# Patient Record
Sex: FEMALE | Race: WHITE | NOT HISPANIC OR LATINO | Employment: UNEMPLOYED | URBAN - METROPOLITAN AREA
[De-identification: names, ages, dates, MRNs, and addresses within clinical notes are randomized per-mention and may not be internally consistent; named-entity substitution may affect disease eponyms.]

---

## 2020-03-14 ENCOUNTER — APPOINTMENT (OUTPATIENT)
Dept: CT IMAGING | Facility: HOSPITAL | Age: 80
End: 2020-03-14

## 2020-03-14 ENCOUNTER — APPOINTMENT (OUTPATIENT)
Dept: GENERAL RADIOLOGY | Facility: HOSPITAL | Age: 80
End: 2020-03-14

## 2020-03-14 ENCOUNTER — APPOINTMENT (OUTPATIENT)
Dept: ULTRASOUND IMAGING | Facility: HOSPITAL | Age: 80
End: 2020-03-14

## 2020-03-14 ENCOUNTER — HOSPITAL ENCOUNTER (INPATIENT)
Facility: HOSPITAL | Age: 80
LOS: 4 days | Discharge: HOME HEALTH | End: 2020-03-18
Attending: INTERNAL MEDICINE | Admitting: HOSPITALIST

## 2020-03-14 DIAGNOSIS — N17.9 ACUTE RENAL FAILURE, UNSPECIFIED ACUTE RENAL FAILURE TYPE (HCC): ICD-10-CM

## 2020-03-14 DIAGNOSIS — F41.9 ANXIETY DISORDER, UNSPECIFIED TYPE: Primary | ICD-10-CM

## 2020-03-14 PROBLEM — I25.10 CORONARY ARTERY DISEASE: Status: ACTIVE | Noted: 2020-03-14

## 2020-03-14 PROBLEM — I10 HYPERTENSION: Status: ACTIVE | Noted: 2020-03-14

## 2020-03-14 PROBLEM — I25.5 CARDIOMYOPATHY, ISCHEMIC: Status: ACTIVE | Noted: 2020-03-14

## 2020-03-14 PROBLEM — J44.9 CHRONIC OBSTRUCTIVE PULMONARY DISEASE (HCC): Status: ACTIVE | Noted: 2020-03-14

## 2020-03-14 LAB
ALBUMIN SERPL-MCNC: 3.4 G/DL (ref 3.5–5.2)
ALBUMIN/GLOB SERPL: 1.3 G/DL
ALP SERPL-CCNC: 100 U/L (ref 39–117)
ALT SERPL W P-5'-P-CCNC: 14 U/L (ref 1–33)
ANION GAP SERPL CALCULATED.3IONS-SCNC: 15 MMOL/L (ref 5–15)
ANION GAP SERPL CALCULATED.3IONS-SCNC: 15 MMOL/L (ref 5–15)
AST SERPL-CCNC: 43 U/L (ref 1–32)
BACTERIA UR QL AUTO: ABNORMAL /HPF
BILIRUB SERPL-MCNC: 0.7 MG/DL (ref 0.2–1.2)
BILIRUB UR QL STRIP: ABNORMAL
BUN BLD-MCNC: 65 MG/DL (ref 8–23)
BUN BLD-MCNC: 66 MG/DL (ref 8–23)
BUN/CREAT SERPL: 11.9 (ref 7–25)
BUN/CREAT SERPL: 17 (ref 7–25)
CALCIUM SPEC-SCNC: 8.1 MG/DL (ref 8.6–10.5)
CALCIUM SPEC-SCNC: 8.3 MG/DL (ref 8.6–10.5)
CHLORIDE SERPL-SCNC: 90 MMOL/L (ref 98–107)
CHLORIDE SERPL-SCNC: 95 MMOL/L (ref 98–107)
CHOLEST SERPL-MCNC: 175 MG/DL (ref 0–200)
CLARITY UR: ABNORMAL
CO2 SERPL-SCNC: 29 MMOL/L (ref 22–29)
CO2 SERPL-SCNC: 30 MMOL/L (ref 22–29)
COLOR UR: ABNORMAL
CREAT BLD-MCNC: 3.83 MG/DL (ref 0.57–1)
CREAT BLD-MCNC: 5.54 MG/DL (ref 0.57–1)
D-LACTATE SERPL-SCNC: 1.1 MMOL/L (ref 0.5–2)
DEPRECATED RDW RBC AUTO: 45.9 FL (ref 37–54)
ERYTHROCYTE [DISTWIDTH] IN BLOOD BY AUTOMATED COUNT: 13.9 % (ref 12.3–15.4)
GFR SERPL CREATININE-BSD FRML MDRD: 11 ML/MIN/1.73
GFR SERPL CREATININE-BSD FRML MDRD: 7 ML/MIN/1.73
GFR SERPL CREATININE-BSD FRML MDRD: ABNORMAL ML/MIN/{1.73_M2}
GFR SERPL CREATININE-BSD FRML MDRD: ABNORMAL ML/MIN/{1.73_M2}
GLOBULIN UR ELPH-MCNC: 2.6 GM/DL
GLUCOSE BLD-MCNC: 164 MG/DL (ref 65–99)
GLUCOSE BLD-MCNC: 84 MG/DL (ref 65–99)
GLUCOSE UR STRIP-MCNC: ABNORMAL MG/DL
HCT VFR BLD AUTO: 32.4 % (ref 34–46.6)
HDLC SERPL-MCNC: 53 MG/DL (ref 40–60)
HGB BLD-MCNC: 10.6 G/DL (ref 12–15.9)
HGB UR QL STRIP.AUTO: ABNORMAL
HYALINE CASTS UR QL AUTO: ABNORMAL /LPF
KETONES UR QL STRIP: NEGATIVE
LDLC SERPL CALC-MCNC: 94 MG/DL (ref 0–100)
LDLC/HDLC SERPL: 1.78 {RATIO}
LEUKOCYTE ESTERASE UR QL STRIP.AUTO: ABNORMAL
LIPASE SERPL-CCNC: 5 U/L (ref 13–60)
LYMPHOCYTES # BLD MANUAL: 1.02 10*3/MM3 (ref 0.7–3.1)
LYMPHOCYTES NFR BLD MANUAL: 3 % (ref 5–12)
LYMPHOCYTES NFR BLD MANUAL: 7 % (ref 19.6–45.3)
MCH RBC QN AUTO: 30.5 PG (ref 26.6–33)
MCHC RBC AUTO-ENTMCNC: 32.8 G/DL (ref 31.5–35.7)
MCV RBC AUTO: 93 FL (ref 79–97)
MONOCYTES # BLD AUTO: 0.44 10*3/MM3 (ref 0.1–0.9)
NEUTROPHILS # BLD AUTO: 13.05 10*3/MM3 (ref 1.7–7)
NEUTROPHILS NFR BLD MANUAL: 72 % (ref 42.7–76)
NEUTS BAND NFR BLD MANUAL: 18 % (ref 0–5)
NITRITE UR QL STRIP: NEGATIVE
NT-PROBNP SERPL-MCNC: 3535 PG/ML (ref 5–1800)
PH UR STRIP.AUTO: <=5 [PH] (ref 5–8)
PLAT MORPH BLD: NORMAL
PLATELET # BLD AUTO: 215 10*3/MM3 (ref 140–450)
PMV BLD AUTO: 7.2 FL (ref 6–12)
POTASSIUM BLD-SCNC: 4 MMOL/L (ref 3.5–5.2)
POTASSIUM BLD-SCNC: 4.3 MMOL/L (ref 3.5–5.2)
PROT SERPL-MCNC: 6 G/DL (ref 6–8.5)
PROT UR QL STRIP: ABNORMAL
RBC # BLD AUTO: 3.49 10*6/MM3 (ref 3.77–5.28)
RBC # UR: ABNORMAL /HPF
RBC MORPH BLD: NORMAL
REF LAB TEST METHOD: ABNORMAL
SCAN SLIDE: NORMAL
SODIUM BLD-SCNC: 134 MMOL/L (ref 136–145)
SODIUM BLD-SCNC: 140 MMOL/L (ref 136–145)
SP GR UR STRIP: 1.02 (ref 1–1.03)
SQUAMOUS #/AREA URNS HPF: ABNORMAL /HPF
TOXIC GRANULATION: ABNORMAL
TRIGL SERPL-MCNC: 139 MG/DL (ref 0–150)
TROPONIN T SERPL-MCNC: 0.05 NG/ML (ref 0–0.03)
TSH SERPL DL<=0.05 MIU/L-ACNC: 0.18 UIU/ML (ref 0.27–4.2)
UROBILINOGEN UR QL STRIP: ABNORMAL
VLDLC SERPL-MCNC: 27.8 MG/DL
WBC NRBC COR # BLD: 14.5 10*3/MM3 (ref 3.4–10.8)
WBC UR QL AUTO: ABNORMAL /HPF
YEAST URNS QL MICRO: ABNORMAL /HPF

## 2020-03-14 PROCEDURE — 84484 ASSAY OF TROPONIN QUANT: CPT | Performed by: NURSE PRACTITIONER

## 2020-03-14 PROCEDURE — 83605 ASSAY OF LACTIC ACID: CPT | Performed by: NURSE PRACTITIONER

## 2020-03-14 PROCEDURE — 25010000002 ENOXAPARIN PER 10 MG: Performed by: NURSE PRACTITIONER

## 2020-03-14 PROCEDURE — 76775 US EXAM ABDO BACK WALL LIM: CPT

## 2020-03-14 PROCEDURE — 94799 UNLISTED PULMONARY SVC/PX: CPT

## 2020-03-14 PROCEDURE — 83690 ASSAY OF LIPASE: CPT | Performed by: NURSE PRACTITIONER

## 2020-03-14 PROCEDURE — 80053 COMPREHEN METABOLIC PANEL: CPT | Performed by: NURSE PRACTITIONER

## 2020-03-14 PROCEDURE — 85025 COMPLETE CBC W/AUTO DIFF WBC: CPT | Performed by: NURSE PRACTITIONER

## 2020-03-14 PROCEDURE — 84443 ASSAY THYROID STIM HORMONE: CPT | Performed by: NURSE PRACTITIONER

## 2020-03-14 PROCEDURE — 81001 URINALYSIS AUTO W/SCOPE: CPT | Performed by: NURSE PRACTITIONER

## 2020-03-14 PROCEDURE — 71045 X-RAY EXAM CHEST 1 VIEW: CPT

## 2020-03-14 PROCEDURE — 87086 URINE CULTURE/COLONY COUNT: CPT | Performed by: INTERNAL MEDICINE

## 2020-03-14 PROCEDURE — 70450 CT HEAD/BRAIN W/O DYE: CPT

## 2020-03-14 PROCEDURE — 85007 BL SMEAR W/DIFF WBC COUNT: CPT | Performed by: NURSE PRACTITIONER

## 2020-03-14 PROCEDURE — 80061 LIPID PANEL: CPT | Performed by: NURSE PRACTITIONER

## 2020-03-14 PROCEDURE — 63710000001 PREDNISONE PER 5 MG: Performed by: NURSE PRACTITIONER

## 2020-03-14 PROCEDURE — 99223 1ST HOSP IP/OBS HIGH 75: CPT | Performed by: INTERNAL MEDICINE

## 2020-03-14 PROCEDURE — 83880 ASSAY OF NATRIURETIC PEPTIDE: CPT | Performed by: INTERNAL MEDICINE

## 2020-03-14 RX ORDER — IPRATROPIUM BROMIDE AND ALBUTEROL SULFATE 2.5; .5 MG/3ML; MG/3ML
3 SOLUTION RESPIRATORY (INHALATION)
Status: DISCONTINUED | OUTPATIENT
Start: 2020-03-14 | End: 2020-03-18 | Stop reason: HOSPADM

## 2020-03-14 RX ORDER — CLONAZEPAM 0.5 MG/1
0.5 TABLET ORAL 3 TIMES DAILY
Status: DISCONTINUED | OUTPATIENT
Start: 2020-03-14 | End: 2020-03-17

## 2020-03-14 RX ORDER — SODIUM CHLORIDE 9 MG/ML
100 INJECTION, SOLUTION INTRAVENOUS CONTINUOUS
Status: DISCONTINUED | OUTPATIENT
Start: 2020-03-14 | End: 2020-03-15

## 2020-03-14 RX ORDER — GABAPENTIN 300 MG/1
300 CAPSULE ORAL 3 TIMES DAILY
COMMUNITY
End: 2020-03-18 | Stop reason: HOSPADM

## 2020-03-14 RX ORDER — MELATONIN
2000 DAILY
COMMUNITY

## 2020-03-14 RX ORDER — ACETAMINOPHEN 160 MG/5ML
650 SOLUTION ORAL EVERY 4 HOURS PRN
Status: DISCONTINUED | OUTPATIENT
Start: 2020-03-14 | End: 2020-03-18 | Stop reason: HOSPADM

## 2020-03-14 RX ORDER — PREDNISONE 1 MG/1
5 TABLET ORAL DAILY
Status: DISCONTINUED | OUTPATIENT
Start: 2020-03-14 | End: 2020-03-18 | Stop reason: HOSPADM

## 2020-03-14 RX ORDER — POLYETHYLENE GLYCOL 3350 17 G/17G
17 POWDER, FOR SOLUTION ORAL 2 TIMES DAILY
COMMUNITY

## 2020-03-14 RX ORDER — ACETAMINOPHEN 325 MG/1
650 TABLET ORAL EVERY 4 HOURS PRN
Status: DISCONTINUED | OUTPATIENT
Start: 2020-03-14 | End: 2020-03-18 | Stop reason: HOSPADM

## 2020-03-14 RX ORDER — SODIUM CHLORIDE 0.9 % (FLUSH) 0.9 %
10 SYRINGE (ML) INJECTION EVERY 12 HOURS SCHEDULED
Status: DISCONTINUED | OUTPATIENT
Start: 2020-03-14 | End: 2020-03-18 | Stop reason: HOSPADM

## 2020-03-14 RX ORDER — ONDANSETRON 4 MG/1
4 TABLET, FILM COATED ORAL EVERY 6 HOURS PRN
Status: DISCONTINUED | OUTPATIENT
Start: 2020-03-14 | End: 2020-03-18 | Stop reason: HOSPADM

## 2020-03-14 RX ORDER — IPRATROPIUM BROMIDE AND ALBUTEROL SULFATE 2.5; .5 MG/3ML; MG/3ML
3 SOLUTION RESPIRATORY (INHALATION)
COMMUNITY

## 2020-03-14 RX ORDER — CLOPIDOGREL BISULFATE 75 MG/1
75 TABLET ORAL DAILY
Status: DISCONTINUED | OUTPATIENT
Start: 2020-03-14 | End: 2020-03-18 | Stop reason: HOSPADM

## 2020-03-14 RX ORDER — TRAMADOL HYDROCHLORIDE 50 MG/1
50 TABLET ORAL 3 TIMES DAILY PRN
COMMUNITY

## 2020-03-14 RX ORDER — CLOPIDOGREL BISULFATE 75 MG/1
75 TABLET ORAL DAILY
COMMUNITY
End: 2021-10-09 | Stop reason: HOSPADM

## 2020-03-14 RX ORDER — PREDNISONE 1 MG/1
5 TABLET ORAL DAILY
COMMUNITY
Start: 2021-08-13

## 2020-03-14 RX ORDER — LISINOPRIL 5 MG/1
5 TABLET ORAL DAILY
COMMUNITY
End: 2020-03-18 | Stop reason: HOSPADM

## 2020-03-14 RX ORDER — TRAZODONE HYDROCHLORIDE 50 MG/1
50 TABLET ORAL NIGHTLY
Status: DISCONTINUED | OUTPATIENT
Start: 2020-03-14 | End: 2020-03-18 | Stop reason: HOSPADM

## 2020-03-14 RX ORDER — ONDANSETRON 2 MG/ML
4 INJECTION INTRAMUSCULAR; INTRAVENOUS EVERY 6 HOURS PRN
Status: DISCONTINUED | OUTPATIENT
Start: 2020-03-14 | End: 2020-03-18 | Stop reason: HOSPADM

## 2020-03-14 RX ORDER — METOPROLOL SUCCINATE 50 MG/1
50 TABLET, EXTENDED RELEASE ORAL DAILY
COMMUNITY

## 2020-03-14 RX ORDER — LISINOPRIL 5 MG/1
5 TABLET ORAL DAILY
Status: DISCONTINUED | OUTPATIENT
Start: 2020-03-14 | End: 2020-03-14

## 2020-03-14 RX ORDER — TRAZODONE HYDROCHLORIDE 50 MG/1
50 TABLET ORAL NIGHTLY
COMMUNITY

## 2020-03-14 RX ORDER — GABAPENTIN 300 MG/1
300 CAPSULE ORAL 3 TIMES DAILY
Status: DISCONTINUED | OUTPATIENT
Start: 2020-03-14 | End: 2020-03-14

## 2020-03-14 RX ORDER — GABAPENTIN 100 MG/1
200 CAPSULE ORAL 3 TIMES DAILY
Status: DISCONTINUED | OUTPATIENT
Start: 2020-03-14 | End: 2020-03-17

## 2020-03-14 RX ORDER — SODIUM CHLORIDE 0.9 % (FLUSH) 0.9 %
10 SYRINGE (ML) INJECTION AS NEEDED
Status: DISCONTINUED | OUTPATIENT
Start: 2020-03-14 | End: 2020-03-18 | Stop reason: HOSPADM

## 2020-03-14 RX ORDER — ACETAMINOPHEN 650 MG/1
650 SUPPOSITORY RECTAL EVERY 4 HOURS PRN
Status: DISCONTINUED | OUTPATIENT
Start: 2020-03-14 | End: 2020-03-18 | Stop reason: HOSPADM

## 2020-03-14 RX ORDER — CLONAZEPAM 0.5 MG/1
0.5 TABLET ORAL 3 TIMES DAILY
COMMUNITY
End: 2020-03-18 | Stop reason: HOSPADM

## 2020-03-14 RX ORDER — METOPROLOL SUCCINATE 50 MG/1
50 TABLET, EXTENDED RELEASE ORAL DAILY
Status: DISCONTINUED | OUTPATIENT
Start: 2020-03-14 | End: 2020-03-18 | Stop reason: HOSPADM

## 2020-03-14 RX ADMIN — CLONAZEPAM 0.5 MG: 0.5 TABLET ORAL at 09:43

## 2020-03-14 RX ADMIN — GABAPENTIN 300 MG: 300 CAPSULE ORAL at 09:43

## 2020-03-14 RX ADMIN — PREDNISONE 5 MG: 5 TABLET ORAL at 09:43

## 2020-03-14 RX ADMIN — CLONAZEPAM 0.5 MG: 0.5 TABLET ORAL at 21:19

## 2020-03-14 RX ADMIN — SODIUM CHLORIDE 100 ML/HR: 900 INJECTION, SOLUTION INTRAVENOUS at 09:38

## 2020-03-14 RX ADMIN — METOPROLOL SUCCINATE 50 MG: 50 TABLET, EXTENDED RELEASE ORAL at 09:43

## 2020-03-14 RX ADMIN — SODIUM CHLORIDE 100 ML/HR: 900 INJECTION, SOLUTION INTRAVENOUS at 02:58

## 2020-03-14 RX ADMIN — IPRATROPIUM BROMIDE AND ALBUTEROL SULFATE 3 ML: .5; 3 SOLUTION RESPIRATORY (INHALATION) at 19:34

## 2020-03-14 RX ADMIN — GABAPENTIN 200 MG: 100 CAPSULE ORAL at 21:19

## 2020-03-14 RX ADMIN — Medication 10 ML: at 21:20

## 2020-03-14 RX ADMIN — CLOPIDOGREL BISULFATE 75 MG: 75 TABLET ORAL at 09:43

## 2020-03-14 RX ADMIN — IPRATROPIUM BROMIDE AND ALBUTEROL SULFATE 3 ML: .5; 3 SOLUTION RESPIRATORY (INHALATION) at 09:25

## 2020-03-14 RX ADMIN — IPRATROPIUM BROMIDE AND ALBUTEROL SULFATE 3 ML: .5; 3 SOLUTION RESPIRATORY (INHALATION) at 11:34

## 2020-03-14 RX ADMIN — TRAZODONE HYDROCHLORIDE 50 MG: 50 TABLET ORAL at 21:19

## 2020-03-14 RX ADMIN — ENOXAPARIN SODIUM 30 MG: 30 INJECTION SUBCUTANEOUS at 19:01

## 2020-03-14 RX ADMIN — SODIUM CHLORIDE 1000 ML: 900 INJECTION, SOLUTION INTRAVENOUS at 03:06

## 2020-03-14 NOTE — PLAN OF CARE
Problem: Patient Care Overview  Goal: Plan of Care Review  Outcome: Ongoing (interventions implemented as appropriate)  Flowsheets (Taken 3/14/2020 0654)  Progress: no change  Plan of Care Reviewed With: patient; daughter  Note:   Pt was a direct admit from a [facility].  Pts daughter arrived shortly following.  Pt is pleasantly confused.  Urine is dark and concentrated and malodorous. Pt and daughter currently sleeping.  No s/s of acute distress.    Goal: Individualization and Mutuality  Outcome: Ongoing (interventions implemented as appropriate)  Goal: Discharge Needs Assessment  Outcome: Ongoing (interventions implemented as appropriate)  Goal: Interprofessional Rounds/Family Conf  Outcome: Ongoing (interventions implemented as appropriate)     Problem: Fall Risk (Adult)  Goal: Identify Related Risk Factors and Signs and Symptoms  Outcome: Ongoing (interventions implemented as appropriate)  Goal: Absence of Fall  Outcome: Ongoing (interventions implemented as appropriate)     Problem: Pain, Chronic (Adult)  Goal: Identify Related Risk Factors and Signs and Symptoms  Outcome: Ongoing (interventions implemented as appropriate)  Goal: Acceptable Pain/Comfort Level and Functional Ability  Outcome: Ongoing (interventions implemented as appropriate)     Problem: Skin Injury Risk (Adult)  Goal: Identify Related Risk Factors and Signs and Symptoms  Outcome: Ongoing (interventions implemented as appropriate)  Goal: Skin Health and Integrity  Outcome: Ongoing (interventions implemented as appropriate)

## 2020-03-14 NOTE — CONSULTS
INITIAL CONSULT NOTE      Patient Name: Dipti Perez  : 1940  MRN: [id number]  Primary Care Physician: Mounika, No Known  Date of admission: 3/14/2020    Patient Care Team:  Provider, No Known as PCP - General        Reason for Consult:       Acute renal failure  Subjective   History of Present Illness:   Chief Complaint: No chief complaint on file.    HISTORY:  Dipti Perez is a 80 y.o. female with past medical history of Congestive heart failure, cardiomyopathy, coronary artery bypass graft, COPD, hyperlipidemia, hypertension, history of some dementia. who presents with clinical status to an outside hospital.  As per record there creatinine was 1 at that hospital.  Unable to get any detailed history from the patient.  Patient has had declining health for the last few months.  With poor appetite lethargy.  No other complaints.  No significant shortness of breath.  Blood pressure running on the low side.  Patient was on ACE inhibitor.  Recent urine studies has only minimal proteinuria.  Labs showed creatinine is 5.4 which is slightly better but proBNP level in 3500.  Calcium 8.3.  Bicarb level is 29 potassium and sodium acceptable.          Review of systems:  Cannot be obtained      Personal History:     Past Medical History:   Past Medical History:   Diagnosis Date   • CAD (coronary artery disease)    • CHF (congestive heart failure) (CMS/[facility])    • COPD (chronic obstructive pulmonary disease) (CMS/[facility])    • Dementia (CMS/[facility])    • Elevated cholesterol    • Hyperlipidemia    • Hypertension        Surgical History:      Past Surgical History:   Procedure Laterality Date   • CARDIAC SURGERY     • CORONARY ARTERY BYPASS GRAFT     • SPINE SURGERY             Family History: Family history is unknown by patient. Otherwise pertinent FHx was reviewed and unremarkable.     Social History:  reports that she has quit smoking. She has a 10.00 pack-year smoking history. She has never used smokeless  tobacco. She reports that she does not drink alcohol or use drugs.      Medications:  Prior to Admission medications    Medication Sig Start Date End Date Taking? Authorizing Provider   cholecalciferol (VITAMIN D3) 25 MCG (1000 UT) tablet Take 2,000 Units by mouth Daily.   Yes January Ribera MD   clonazePAM (KlonoPIN) 0.5 MG tablet Take 0.5 mg by mouth 3 (Three) Times a Day.   Yes January Ribera MD   clopidogrel (PLAVIX) 75 MG tablet Take 75 mg by mouth Daily.   Yes January Ribera MD   gabapentin (NEURONTIN) 300 MG capsule Take 300 mg by mouth 3 (Three) Times a Day.   Yes January Ribera MD   ipratropium-albuterol (DUO-NEB) 0.5-2.5 mg/3 ml nebulizer Take 3 mL by nebulization 4 (Four) Times a Day.   Yes January Ribera MD   lisinopril (PRINIVIL,ZESTRIL) 5 MG tablet Take 5 mg by mouth Daily.   Yes January Ribera MD   metoprolol succinate XL (TOPROL-XL) 50 MG 24 hr tablet Take 50 mg by mouth Daily.   Yes January Ribera MD   polyethylene glycol (MIRALAX) packet Take 17 g by mouth 2 (Two) Times a Day.   Yes January Ribera MD   predniSONE (DELTASONE) 5 MG tablet Take 5 mg by mouth Daily.   Yes January Ribera MD   traMADol (ULTRAM) 50 MG tablet Take 50 mg by mouth 3 (Three) Times a Day As Needed for Moderate Pain  ( MG TID PRN).   Yes January Ribera MD   traZODone (DESYREL) 50 MG tablet Take 50 mg by mouth Every Night.   Yes January Ribera MD     Scheduled Meds:  clonazePAM 0.5 mg Oral TID   clopidogrel 75 mg Oral Daily   enoxaparin 30 mg Subcutaneous Daily   gabapentin 200 mg Oral TID   ipratropium-albuterol 3 mL Nebulization 4x Daily - RT   metoprolol succinate XL 50 mg Oral Daily   predniSONE 5 mg Oral Daily   sodium chloride 10 mL Intravenous Q12H   traZODone 50 mg Oral Nightly     Continuous Infusions:  sodium chloride 100 mL/hr Last Rate: 100 mL/hr (03/14/20 0938)     PRN Meds:•  acetaminophen **OR** acetaminophen **OR** acetaminophen  •   ondansetron **OR** ondansetron  •  sodium chloride  Allergies:    Allergies   Allergen Reactions   • Keflex [Cephalexin] Anaphylaxis       Objective   Exam:     Vital Signs  Temp:  [97.5 °F (36.4 °C)-98.4 °F (36.9 °C)] 97.5 °F (36.4 °C)  Heart Rate:  [65-81] 65  Resp:  [13-20] 18  BP: ()/(48-62) 97/48  SpO2:  [85 %-100 %] 96 %  on  Flow (L/min):  [1-5] 3;   Device (Oxygen Therapy): nasal cannula  Body mass index is 21.28 kg/m².  EXAM  General: Elderly female  female in no acute distress.    Head:      Normocephalic and atraumatic.    Eyes:      PERRL/EOM intact, conjunctiva and sclera clear with out nystagmus.    Neck:      No masses, thyromegaly,  trachea central   Lungs:    Clear bilaterally to auscultation.    Heart:      Systolic murmur  Abd:        Soft, nontender, not distended, bowel sounds positive, no shifting dullness.  Msk:        Not examined  Pulses:   Pulses normal in all 4 extremities.    Extr:        No cyanosis or clubbing--maybe some edema.    Neuro:    No focal deficits.   Some weakness and lethargy  Skin:       Intact without lesions or rashes.    Psych:    Patient is somewhat confused    Results Review:  I have personally reviewed most recent Data :  BMP @LABRCNT(creatinine:10)  CBC    Results from last 7 days   Lab Units 03/14/20  0437   WBC 10*3/mm3 14.50*   HEMOGLOBIN g/dL 10.6*   PLATELETS 10*3/mm3 215     CMP Results from last 7 days   Lab Units 03/14/20  0253   SODIUM mmol/L 134*   POTASSIUM mmol/L 4.0   CHLORIDE mmol/L 90*   CO2 mmol/L 29.0   BUN mg/dL 66*   CREATININE mg/dL 5.54*   GLUCOSE mg/dL 84   ALBUMIN g/dL 3.40*   BILIRUBIN mg/dL 0.7   ALK PHOS U/L 100   AST (SGOT) U/L 43*   ALT (SGPT) U/L 14   LIPASE U/L 5*     ABG      Imaging Results (Last 24 Hours)     Procedure Component Value Units Date/Time    US Renal Bilateral [[id number]] Resulted:  03/14/20 1816     Updated:  03/14/20 1816                 Assessment/Plan   Assessment and Plan:         Acute renal failure (ARF)  (CMS/[facility])    Benign hypertensive heart disease    Cardiomyopathy, ischemic    Chronic obstructive pulmonary disease (CMS/[facility])    Coronary artery disease    Hypertension    ASSESSMENT:  • Acute kidney injury: Patient BNP level is high echocardiogram is still pending but with bicarb level of 29 normal electrolytes patient acute kidney injury seems to be related to volume depletion but BNP level is still elevated.  • Congestive heart failure ejection fraction unknown repeat echocardiogram pending  • Acute coronary syndrome  • History of some dementia  • History of hypertension      Plan:     • Follow-up with a urine studies at this time to estimate protein excretion  • Follow-up with echocardiogram  • Etiology of acute increase in creatinine seem to be multifactorial including low blood pressure, decreased p.o. intake some volume depletion in the presence of ACE inhibitor's.  No evidence of nonsteroidal anti-inflammatory drugs or any contrast nephropathy at this time.  But renal functions are slowly improving for now  • And blood pressure is on the low side we will give some node bolus and follow-up with volume status closely  • Repeat creatinine continue to improve with some improvement in the volume status  • We will check stool for C. difficile as patient is complaining of some diarrhea  • No more IV fluid needed at this time as BNP level is significantly elevated and patient hemodynamics have improved  • Follow-up with echocardiogram which is pending  • Follow-up with cardiology regarding underlying cardiac condition  • Hold ACE inhibitor's at this time  •       Note started  by Zoran Do MD, 03/14/20, 6:27 PM.  [facility] kidney consultant

## 2020-03-14 NOTE — PLAN OF CARE
Problem: Patient Care Overview  Goal: Plan of Care Review  Outcome: Ongoing (interventions implemented as appropriate)  Flowsheets  Taken 3/14/2020 1450 by Robina Garza RN  Progress: no change  Taken 3/14/2020 0654 by Yasmin Ceron RN  Plan of Care Reviewed With: patient;daughter  Note:   Nephrology consulted. Pt more alert and oriented this afternoon. Family at bedside. Plan of care discussed. Will continue to monitor.

## 2020-03-14 NOTE — H&P
[facility] Medicine Services      Patient Name: Dipti Perez  : 1940  MRN: [id number]  Primary Care Physician: Mounika, No Known  Date of admission: 3/14/2020    Patient Care Team:  Provider, No Known as PCP - General          Subjective   History Present Illness     Chief Complaint: No chief complaint on file.      Ms. Perez is a 80 y.o.  presents to [facility] complaining of acute renal injury         80-year-old presents as a transfer from [facility] where she presented with a chief complaint that her daughter felt patient was confused and had fallen 2 days ago secondary to being off balance.  The patient was also reported to have decreased appetite and decreased urine output.  The patient has been transferred to this facility secondary to acute renal failure.  The daughter is not present at time of interview.  The patient reports that she has been eating and drinking okay but there is noted significantly dry mucosa.  The patient is able to see that she is at [facility] and that she is 80 years old.  However, she cannot name the month or year.  Through review of records there is report of baseline dementia, the patient lives at home with her daughter.  The patient denies any recent changes to her medications, subjective fever or chills.  Again, however, the patient is unable to name the month or year or so her input may be suspect.    Review of records from prior facility show WBC 19.03, hemoglobin 12.2, influenza A negative, influenza B negative; lactic acid 3.9; mildly elevated AST at 55, ALT 24, alkaline phosphate 97, bilirubin total 0.9      Review of Systems   Unable to perform ROS: mental status change           Personal History     Past Medical History:   Past Medical History:   Diagnosis Date   • CAD (coronary artery disease)    • CHF (congestive heart failure) (CMS/[facility])    • COPD (chronic obstructive pulmonary disease) (CMS/[facility])     • Dementia (CMS/[facility])    • Elevated cholesterol    • Hyperlipidemia    • Hypertension        Surgical History:      Past Surgical History:   Procedure Laterality Date   • CARDIAC SURGERY     • CORONARY ARTERY BYPASS GRAFT     • SPINE SURGERY             Family History: Family history is unknown by patient. Otherwise pertinent FHx was reviewed and unremarkable.     Social History:  reports that she has quit smoking. She has a 10.00 pack-year smoking history. She has never used smokeless tobacco. She reports that she does not drink alcohol or use drugs.      Medications:  Prior to Admission medications    Medication Sig Start Date End Date Taking? Authorizing Provider   cholecalciferol (VITAMIN D3) 25 MCG (1000 UT) tablet Take 2,000 Units by mouth Daily.   Yes January Ribera MD   clonazePAM (KlonoPIN) 0.5 MG tablet Take 0.5 mg by mouth 3 (Three) Times a Day.   Yes January Ribera MD   clopidogrel (PLAVIX) 75 MG tablet Take 75 mg by mouth Daily.   Yes January Ribera MD   gabapentin (NEURONTIN) 300 MG capsule Take 300 mg by mouth 3 (Three) Times a Day.   Yes January Ribera MD   ipratropium-albuterol (DUO-NEB) 0.5-2.5 mg/3 ml nebulizer Take 3 mL by nebulization 4 (Four) Times a Day.   Yes January Ribera MD   lisinopril (PRINIVIL,ZESTRIL) 5 MG tablet Take 5 mg by mouth Daily.   Yes January Ribera MD   metoprolol succinate XL (TOPROL-XL) 50 MG 24 hr tablet Take 50 mg by mouth Daily.   Yes January Ribera MD   polyethylene glycol (MIRALAX) packet Take 17 g by mouth 2 (Two) Times a Day.   Yes January Ribera MD   predniSONE (DELTASONE) 5 MG tablet Take 5 mg by mouth Daily.   Yes January Ribera MD   traMADol (ULTRAM) 50 MG tablet Take 50 mg by mouth 3 (Three) Times a Day As Needed for Moderate Pain  ( MG TID PRN).   Yes January Ribera MD   traZODone (DESYREL) 50 MG tablet Take 50 mg by mouth Every Night.   Yes January Ribera MD       Allergies:     Allergies   Allergen Reactions   • Keflex [Cephalexin] Anaphylaxis       Objective   Objective     Vital Signs  Temp:  [97.8 °F (36.6 °C)] 97.8 °F (36.6 °C)  Resp:  [15] 15  BP: (114)/(62) 114/62     on  Flow (L/min):  [1] 1;   Device (Oxygen Therapy): nasal cannula  Body mass index is 21.28 kg/m².    Physical Exam   Constitutional: She is oriented to person, place, and time. She appears well-developed. No distress.   thin   HENT:   Head: Normocephalic and atraumatic.   Eyes: Pupils are equal, round, and reactive to light. Conjunctivae and EOM are normal. No scleral icterus.   Neck: Normal range of motion. Neck supple. No JVD present. No tracheal deviation present. No thyromegaly present.   Cardiovascular: Normal rate, regular rhythm, normal heart sounds and intact distal pulses.   No murmur heard.  Pulmonary/Chest: Effort normal and breath sounds normal. No respiratory distress. She has no wheezes.   Abdominal: Soft. Bowel sounds are normal. She exhibits no distension.   Musculoskeletal: Normal range of motion. She exhibits no edema.   Lymphadenopathy:     She has no cervical adenopathy.   Neurological: She is alert and oriented to person, place, and time.   Person, age and place   Skin: Skin is warm. Capillary refill takes less than 2 seconds. She is not diaphoretic. No erythema.   Psychiatric: She has a normal mood and affect.   Patient is confused and has difficulty thinking of some items and is lucid with other consistent with a global deficit likely metabolic encephalopathy from azotemia   Vitals reviewed.      Results Review:  I have personally reviewed most recent lab results and agree with findings, most notably: Acute renal failure.    Results from last 7 days   Lab Units 03/14/20  0437   WBC 10*3/mm3 14.50*   HEMOGLOBIN g/dL 10.6*   HEMATOCRIT % 32.4*   PLATELETS 10*3/mm3 215     Results from last 7 days   Lab Units 03/14/20  0415 03/14/20  0253   SODIUM mmol/L  --  134*   POTASSIUM mmol/L  --  4.0      CHLORIDE mmol/L  --  90*   CO2 mmol/L  --  29.0   BUN mg/dL  --  66*   CREATININE mg/dL  --  5.54*   GLUCOSE mg/dL  --  84   CALCIUM mg/dL  --  8.3*   ALT (SGPT) U/L  --  14   AST (SGOT) U/L  --  43*   TROPONIN T ng/mL  --  0.052*   LACTATE mmol/L 1.1  --      Estimated Creatinine Clearance: 7 mL/min (A) (by C-G formula based on SCr of 5.54 mg/dL (H)).  Brief Urine Lab Results  (Last result in the past 365 days)      Color   Clarity   Blood   Leuk Est   Nitrite   Protein   CREAT   Urine HCG        03/14/20 0620 Dark Yellow Hazy Small (1+) Small (1+) Negative 30 mg/dL (1+)               Microbiology Results (last 10 days)     ** No results found for the last 240 hours. **          ECG/EMG Results (most recent)     None                    No radiology results for the last 7 days      Estimated Creatinine Clearance: 7 mL/min (A) (by C-G formula based on SCr of 5.54 mg/dL (H)).    Assessment/Plan   Assessment/Plan       Active Hospital Problems    Diagnosis  POA   • Acute renal failure (ARF) (CMS/[facility]) [N17.9]  Yes     Priority: High   • Benign hypertensive heart disease [I11.9]  Yes     Priority: High   • Cardiomyopathy, ischemic [I25.5]  Yes   • Chronic obstructive pulmonary disease (CMS/[facility]) [J44.9]  Yes   • Coronary artery disease [I25.10]  Yes   • Hypertension [I10]  Yes      Resolved Hospital Problems   No resolved problems to display.     Altered mental status secondary to acute metabolic encephalopathy related to azotemia from acute renal failure; consideration for UTI: Fall precautions    Acute renal failure, creatinine 6.78 with BUN 75: Aggressive IV fluids; Freeman catheter for urine output monitoring    --Patient has known history of CHF and has a elevated proBNP of 4243    --Per verbal report from prior facility the patient had a creatinine done in October 2019 at that facility which was less than 1.0    --Repeat creatinine at this facility 5.54    Leukocytosis, marked, improved with repeat labs at this  facility with WBC 14.5 down from 19.5--likely reactive: Repeat CBC     UTI--IV Rocephin; culture pending    Hyponatremia, mild, sodium 134--José Miguel secondary to insensible loss through sweating: IV fluids normal saline; repeat BMP    Indeterminately elevated troponin, 0.052, uncertain etiology: Serial troponin    Dementia, mild, chronic with anxiety: Continue clonazepam, trazodone    Hypertension, chronic: Continue Prinivil, Toprol    Chronic pain: Continue Neurontin    COPD, chronic without acute exacerbation; with sleep apnea: Continue duo nebs, prednisone, home oxygen at 2 L per nasal cannula; home CPAP at night    CAD: Continue Toprol, Plavix      VTE Prophylaxis -   Mechanical Order History:     None      Pharmalogical Order History:     Ordered     Dose Route Frequency Stop    03/14/20 0240  enoxaparin (LOVENOX) syringe 30 mg      30 mg SC Every 24 Hours --          CODE STATUS:    Code Status and Medical Interventions:   Ordered at: 03/14/20 0241     Code Status:    CPR     Medical Interventions (Level of Support Prior to Arrest):    Full         I discussed the patient's findings and my recommendations with patient and nursing staff.        Electronically signed by MAYA Rdz, 03/14/20, 2:52 AM.  [facility]

## 2020-03-15 ENCOUNTER — APPOINTMENT (OUTPATIENT)
Dept: CARDIOLOGY | Facility: HOSPITAL | Age: 80
End: 2020-03-15

## 2020-03-15 LAB
ALBUMIN SERPL-MCNC: 3.1 G/DL (ref 3.5–5.2)
ALBUMIN/GLOB SERPL: 1.1 G/DL
ALP SERPL-CCNC: 81 U/L (ref 39–117)
ALT SERPL W P-5'-P-CCNC: 13 U/L (ref 1–33)
ANION GAP SERPL CALCULATED.3IONS-SCNC: 16 MMOL/L (ref 5–15)
AST SERPL-CCNC: 37 U/L (ref 1–32)
BASOPHILS # BLD AUTO: 0 10*3/MM3 (ref 0–0.2)
BASOPHILS NFR BLD AUTO: 0.1 % (ref 0–1.5)
BH CV ECHO MEAS - ACS: 1.8 CM
BH CV ECHO MEAS - AO MAX PG (FULL): 3.7 MMHG
BH CV ECHO MEAS - AO MAX PG: 9.1 MMHG
BH CV ECHO MEAS - AO MEAN PG (FULL): 2.8 MMHG
BH CV ECHO MEAS - AO MEAN PG: 6.3 MMHG
BH CV ECHO MEAS - AO ROOT AREA (BSA CORRECTED): 1.7
BH CV ECHO MEAS - AO ROOT AREA: 5.3 CM^2
BH CV ECHO MEAS - AO ROOT DIAM: 2.6 CM
BH CV ECHO MEAS - AO V2 MAX: 150.8 CM/SEC
BH CV ECHO MEAS - AO V2 MEAN: 121.5 CM/SEC
BH CV ECHO MEAS - AO V2 VTI: 39.3 CM
BH CV ECHO MEAS - AVA(I,A): 1.8 CM^2
BH CV ECHO MEAS - AVA(I,D): 1.8 CM^2
BH CV ECHO MEAS - AVA(V,A): 1.8 CM^2
BH CV ECHO MEAS - AVA(V,D): 1.8 CM^2
BH CV ECHO MEAS - BSA(HAYCOCK): 1.5 M^2
BH CV ECHO MEAS - BSA: 1.5 M^2
BH CV ECHO MEAS - BZI_BMI: 20.4 KILOGRAMS/M^2
BH CV ECHO MEAS - BZI_METRIC_HEIGHT: 160 CM
BH CV ECHO MEAS - BZI_METRIC_WEIGHT: 52.2 KG
BH CV ECHO MEAS - EDV(CUBED): 19 ML
BH CV ECHO MEAS - EDV(MOD-SP4): 29.6 ML
BH CV ECHO MEAS - EDV(TEICH): 26.2 ML
BH CV ECHO MEAS - EF(CUBED): 58.7 %
BH CV ECHO MEAS - EF(MOD-BP): 39 %
BH CV ECHO MEAS - EF(MOD-SP4): 39 %
BH CV ECHO MEAS - EF(TEICH): 52.3 %
BH CV ECHO MEAS - ESV(CUBED): 7.8 ML
BH CV ECHO MEAS - ESV(MOD-SP4): 18.1 ML
BH CV ECHO MEAS - ESV(TEICH): 12.5 ML
BH CV ECHO MEAS - FS: 25.5 %
BH CV ECHO MEAS - IVS/LVPW: 0.86
BH CV ECHO MEAS - IVSD: 0.84 CM
BH CV ECHO MEAS - LA DIMENSION(2D): 2.6 CM
BH CV ECHO MEAS - LV DIASTOLIC VOL/BSA (35-75): 19.4 ML/M^2
BH CV ECHO MEAS - LV MASS(C)D: 60.1 GRAMS
BH CV ECHO MEAS - LV MASS(C)DI: 39.3 GRAMS/M^2
BH CV ECHO MEAS - LV MAX PG: 5.4 MMHG
BH CV ECHO MEAS - LV MEAN PG: 3.5 MMHG
BH CV ECHO MEAS - LV SYSTOLIC VOL/BSA (12-30): 11.8 ML/M^2
BH CV ECHO MEAS - LV V1 MAX: 116.3 CM/SEC
BH CV ECHO MEAS - LV V1 MEAN: 90.7 CM/SEC
BH CV ECHO MEAS - LV V1 VTI: 29.3 CM
BH CV ECHO MEAS - LVIDD: 2.7 CM
BH CV ECHO MEAS - LVIDS: 2 CM
BH CV ECHO MEAS - LVOT AREA: 2.4 CM^2
BH CV ECHO MEAS - LVOT DIAM: 1.7 CM
BH CV ECHO MEAS - LVPWD: 0.98 CM
BH CV ECHO MEAS - MV A MAX VEL: 97.1 CM/SEC
BH CV ECHO MEAS - MV DEC SLOPE: 344.7 CM/SEC^2
BH CV ECHO MEAS - MV DEC TIME: 0.28 SEC
BH CV ECHO MEAS - MV E MAX VEL: 95.5 CM/SEC
BH CV ECHO MEAS - MV E/A: 0.98
BH CV ECHO MEAS - MV MAX PG: 4.7 MMHG
BH CV ECHO MEAS - MV MEAN PG: 2.5 MMHG
BH CV ECHO MEAS - MV V2 MAX: 108.9 CM/SEC
BH CV ECHO MEAS - MV V2 MEAN: 76.3 CM/SEC
BH CV ECHO MEAS - MV V2 VTI: 26.4 CM
BH CV ECHO MEAS - MVA(VTI): 2.6 CM^2
BH CV ECHO MEAS - PA MAX PG (FULL): 2.4 MMHG
BH CV ECHO MEAS - PA MAX PG: 4.1 MMHG
BH CV ECHO MEAS - PA V2 MAX: 101.4 CM/SEC
BH CV ECHO MEAS - RV MAX PG: 1.8 MMHG
BH CV ECHO MEAS - RV MEAN PG: 0.98 MMHG
BH CV ECHO MEAS - RV V1 MAX: 66.2 CM/SEC
BH CV ECHO MEAS - RV V1 MEAN: 46.1 CM/SEC
BH CV ECHO MEAS - RV V1 VTI: 17.4 CM
BH CV ECHO MEAS - RVDD: 2.7 CM
BH CV ECHO MEAS - SI(AO): 135.7 ML/M^2
BH CV ECHO MEAS - SI(CUBED): 7.3 ML/M^2
BH CV ECHO MEAS - SI(LVOT): 45.2 ML/M^2
BH CV ECHO MEAS - SI(MOD-SP4): 7.5 ML/M^2
BH CV ECHO MEAS - SI(TEICH): 9 ML/M^2
BH CV ECHO MEAS - SV(AO): 207.4 ML
BH CV ECHO MEAS - SV(CUBED): 11.2 ML
BH CV ECHO MEAS - SV(LVOT): 69.2 ML
BH CV ECHO MEAS - SV(MOD-SP4): 11.5 ML
BH CV ECHO MEAS - SV(TEICH): 13.7 ML
BILIRUB SERPL-MCNC: 0.5 MG/DL (ref 0.2–1.2)
BUN BLD-MCNC: 61 MG/DL (ref 8–23)
BUN/CREAT SERPL: 20.9 (ref 7–25)
CALCIUM SPEC-SCNC: 8.6 MG/DL (ref 8.6–10.5)
CHLORIDE SERPL-SCNC: 98 MMOL/L (ref 98–107)
CHLORIDE UR-SCNC: 39 MMOL/L
CO2 SERPL-SCNC: 26 MMOL/L (ref 22–29)
CREAT BLD-MCNC: 2.92 MG/DL (ref 0.57–1)
CREAT UR-MCNC: 83.4 MG/DL
DEPRECATED RDW RBC AUTO: 45.1 FL (ref 37–54)
EOSINOPHIL # BLD AUTO: 0 10*3/MM3 (ref 0–0.4)
EOSINOPHIL NFR BLD AUTO: 0.3 % (ref 0.3–6.2)
EOSINOPHIL SPEC QL MICRO: 0 % EOS/100 CELLS (ref 0–0)
ERYTHROCYTE [DISTWIDTH] IN BLOOD BY AUTOMATED COUNT: 13.7 % (ref 12.3–15.4)
GFR SERPL CREATININE-BSD FRML MDRD: 15 ML/MIN/1.73
GLOBULIN UR ELPH-MCNC: 2.9 GM/DL
GLUCOSE BLD-MCNC: 97 MG/DL (ref 65–99)
GLUCOSE BLDC GLUCOMTR-MCNC: 128 MG/DL (ref 70–105)
HCT VFR BLD AUTO: 33.2 % (ref 34–46.6)
HGB BLD-MCNC: 11 G/DL (ref 12–15.9)
LV EF 2D ECHO EST: 60 %
LYMPHOCYTES # BLD AUTO: 1.2 10*3/MM3 (ref 0.7–3.1)
LYMPHOCYTES NFR BLD AUTO: 12.4 % (ref 19.6–45.3)
MAGNESIUM SERPL-MCNC: 1.9 MG/DL (ref 1.6–2.4)
MCH RBC QN AUTO: 31.1 PG (ref 26.6–33)
MCHC RBC AUTO-ENTMCNC: 33.1 G/DL (ref 31.5–35.7)
MCV RBC AUTO: 93.7 FL (ref 79–97)
MONOCYTES # BLD AUTO: 0.6 10*3/MM3 (ref 0.1–0.9)
MONOCYTES NFR BLD AUTO: 5.8 % (ref 5–12)
NEUTROPHILS # BLD AUTO: 7.9 10*3/MM3 (ref 1.7–7)
NEUTROPHILS NFR BLD AUTO: 81.4 % (ref 42.7–76)
NRBC BLD AUTO-RTO: 0 /100 WBC (ref 0–0.2)
NT-PROBNP SERPL-MCNC: 5684 PG/ML (ref 5–1800)
PHOSPHATE SERPL-MCNC: 4.5 MG/DL (ref 2.5–4.5)
PLATELET # BLD AUTO: 230 10*3/MM3 (ref 140–450)
PMV BLD AUTO: 7.3 FL (ref 6–12)
POTASSIUM BLD-SCNC: 4.2 MMOL/L (ref 3.5–5.2)
PROT SERPL-MCNC: 6 G/DL (ref 6–8.5)
PROT UR-MCNC: 18.6 MG/DL
RBC # BLD AUTO: 3.54 10*6/MM3 (ref 3.77–5.28)
SODIUM BLD-SCNC: 140 MMOL/L (ref 136–145)
SODIUM UR-SCNC: 50 MMOL/L
WBC NRBC COR # BLD: 9.8 10*3/MM3 (ref 3.4–10.8)

## 2020-03-15 PROCEDURE — 84156 ASSAY OF PROTEIN URINE: CPT | Performed by: INTERNAL MEDICINE

## 2020-03-15 PROCEDURE — 82436 ASSAY OF URINE CHLORIDE: CPT | Performed by: INTERNAL MEDICINE

## 2020-03-15 PROCEDURE — 25010000002 ENOXAPARIN PER 10 MG: Performed by: NURSE PRACTITIONER

## 2020-03-15 PROCEDURE — 82570 ASSAY OF URINE CREATININE: CPT | Performed by: INTERNAL MEDICINE

## 2020-03-15 PROCEDURE — 94799 UNLISTED PULMONARY SVC/PX: CPT

## 2020-03-15 PROCEDURE — 99233 SBSQ HOSP IP/OBS HIGH 50: CPT | Performed by: INTERNAL MEDICINE

## 2020-03-15 PROCEDURE — 83880 ASSAY OF NATRIURETIC PEPTIDE: CPT | Performed by: INTERNAL MEDICINE

## 2020-03-15 PROCEDURE — 63710000001 PREDNISONE PER 5 MG: Performed by: NURSE PRACTITIONER

## 2020-03-15 PROCEDURE — 97530 THERAPEUTIC ACTIVITIES: CPT

## 2020-03-15 PROCEDURE — 93306 TTE W/DOPPLER COMPLETE: CPT | Performed by: INTERNAL MEDICINE

## 2020-03-15 PROCEDURE — 97162 PT EVAL MOD COMPLEX 30 MIN: CPT

## 2020-03-15 PROCEDURE — 84100 ASSAY OF PHOSPHORUS: CPT | Performed by: INTERNAL MEDICINE

## 2020-03-15 PROCEDURE — 80053 COMPREHEN METABOLIC PANEL: CPT | Performed by: INTERNAL MEDICINE

## 2020-03-15 PROCEDURE — 83735 ASSAY OF MAGNESIUM: CPT | Performed by: INTERNAL MEDICINE

## 2020-03-15 PROCEDURE — 87205 SMEAR GRAM STAIN: CPT | Performed by: INTERNAL MEDICINE

## 2020-03-15 PROCEDURE — 82962 GLUCOSE BLOOD TEST: CPT

## 2020-03-15 PROCEDURE — 85025 COMPLETE CBC W/AUTO DIFF WBC: CPT | Performed by: INTERNAL MEDICINE

## 2020-03-15 PROCEDURE — 93306 TTE W/DOPPLER COMPLETE: CPT

## 2020-03-15 PROCEDURE — 84300 ASSAY OF URINE SODIUM: CPT | Performed by: INTERNAL MEDICINE

## 2020-03-15 RX ORDER — FUROSEMIDE 20 MG/1
20 TABLET ORAL DAILY
Status: DISCONTINUED | OUTPATIENT
Start: 2020-03-15 | End: 2020-03-16

## 2020-03-15 RX ADMIN — TRAZODONE HYDROCHLORIDE 50 MG: 50 TABLET ORAL at 20:47

## 2020-03-15 RX ADMIN — CLONAZEPAM 0.5 MG: 0.5 TABLET ORAL at 16:41

## 2020-03-15 RX ADMIN — METOPROLOL SUCCINATE 50 MG: 50 TABLET, EXTENDED RELEASE ORAL at 09:41

## 2020-03-15 RX ADMIN — CLONAZEPAM 0.5 MG: 0.5 TABLET ORAL at 20:47

## 2020-03-15 RX ADMIN — Medication 10 ML: at 20:49

## 2020-03-15 RX ADMIN — PREDNISONE 5 MG: 5 TABLET ORAL at 09:41

## 2020-03-15 RX ADMIN — GABAPENTIN 200 MG: 100 CAPSULE ORAL at 09:41

## 2020-03-15 RX ADMIN — CLONAZEPAM 0.5 MG: 0.5 TABLET ORAL at 09:41

## 2020-03-15 RX ADMIN — GABAPENTIN 200 MG: 100 CAPSULE ORAL at 20:47

## 2020-03-15 RX ADMIN — IPRATROPIUM BROMIDE AND ALBUTEROL SULFATE 3 ML: .5; 3 SOLUTION RESPIRATORY (INHALATION) at 15:47

## 2020-03-15 RX ADMIN — FUROSEMIDE 20 MG: 20 TABLET ORAL at 16:41

## 2020-03-15 RX ADMIN — ENOXAPARIN SODIUM 30 MG: 30 INJECTION SUBCUTANEOUS at 16:41

## 2020-03-15 RX ADMIN — Medication 10 ML: at 09:41

## 2020-03-15 RX ADMIN — CLOPIDOGREL BISULFATE 75 MG: 75 TABLET ORAL at 09:41

## 2020-03-15 RX ADMIN — IPRATROPIUM BROMIDE AND ALBUTEROL SULFATE 3 ML: .5; 3 SOLUTION RESPIRATORY (INHALATION) at 21:03

## 2020-03-15 NOTE — PROGRESS NOTES
PROGRESS NOTE      Patient Name: Dipti Perez  : 1940  MRN: [id number]  Primary Care Physician: Provider, No Known  Date of admission: 3/14/2020    Patient Care Team:  Provider, No Known as PCP - General        Subjective   Subjective:   Elderly female slightly short of breath.  No significant swelling of the lower extremities.  Patient still has some diarrhea but only once at night.  Abdominal pain is stable  Review of systems:  All other review of system unremarkable      Allergies:    Allergies   Allergen Reactions   • Keflex [Cephalexin] Anaphylaxis       Objective   Exam:     Vital Signs  Temp:  [97.5 °F (36.4 °C)-99 °F (37.2 °C)] 99 °F (37.2 °C)  Heart Rate:  [65-95] 95  Resp:  [14-25] 20  BP: ()/(48-78) 137/67  SpO2:  [85 %-100 %] 98 %  on  Flow (L/min):  [2-25.5] 2.5;   Device (Oxygen Therapy): nasal cannula  Body mass index is 20.5 kg/m².    General: Elderly white  female in no acute distress.    Head:      Normocephalic and atraumatic.    Eyes:      PERRL/EOM intact, conjunctiva and sclera clear with out nystagmus.    Neck:      No masses, thyromegaly,  trachea central with normal respiratory effort   Lungs:    Clear bilaterally to auscultation.    Heart:      Regular rate and rhythm, no murmur no gallop  Abd:        BS +pedro, soft nontender,no shiftg dullness   Pulses:   Pulses palpable  Extr:        No cyanosis or clubbing--no significant edema.    Neuro:    No focal deficits.   alert oriented x3  Skin:       Intact without lesions or rashes.    Psych:    Alert and cooperative; normal mood and affect; .      Results Review:  I have personally reviewed most recent Data :  CBC    Results from last 7 days   Lab Units 03/15/20  0443 20  0437   WBC 10*3/mm3 9.80 14.50*   HEMOGLOBIN g/dL 11.0* 10.6*   PLATELETS 10*3/mm3 230 215     CMP Results from last 7 days   Lab Units 03/15/20  0342 20  1852 20  0253   SODIUM mmol/L 140 140 134*   POTASSIUM mmol/L 4.2 4.3 4.0      CHLORIDE mmol/L 98 95* 90*   CO2 mmol/L 26.0 30.0* 29.0   BUN mg/dL 61* 65* 66*   CREATININE mg/dL 2.92* 3.83* 5.54*   GLUCOSE mg/dL 97 164* 84   ALBUMIN g/dL 3.10*  --  3.40*   BILIRUBIN mg/dL 0.5  --  0.7   ALK PHOS U/L 81  --  100   AST (SGOT) U/L 37*  --  43*   ALT (SGPT) U/L 13  --  14   LIPASE U/L  --   --  5*     ABG      Imaging Results (Last 24 Hours)     Procedure Component Value Units Date/Time    XR Chest 1 View [[id number]] Collected:  03/15/20 0705     Updated:  03/15/20 0710    Narrative:       DATE OF EXAM:  3/14/2020 9:00 PM     PROCEDURE:  XR CHEST 1 VW-     INDICATIONS:  MD ordered. Possible PNA.     COMPARISON:  11/15/2012.     TECHNIQUE:   Single radiographic AP view of the chest was obtained.     FINDINGS:  Two AP upright portable views of the chest reveal mild cardiac  enlargement and no focal infiltrate. No pneumothorax. There is  emphysematous contour of the lungs.  The patient has undergone median  sternotomy and suspected CABG surgery. There is chronic calcified  granulomatous disease of the chest. The thoracic aorta is  atherosclerotic. There may be chronic interstitial lung disease, as  well. No significant interval change is seen since the prior study.       Impression:       No acute infiltrate is appreciated.     Electronically Signed By-Dr. Luis Rock MD On:3/15/2020 7:08 AM  This report was finalized on [id number] by Dr. Luis Rock MD.    US Renal Bilateral [[id number]] Collected:  03/14/20 2350     Updated:  03/14/20 2354    Narrative:       DATE OF EXAM:  3/14/2020 6:16 PM     PROCEDURE:  US RENAL BILATERAL-     INDICATIONS:  chivo acute renal failure     COMPARISON:  CT of the chest that included the superior part of the right and left  kidneys performed on 11/06/2012     TECHNIQUE:   Grayscale and color Doppler ultrasound evaluation of the kidneys and  urinary bladder was performed.        FINDINGS:  The right kidney measures 8.9 cm x 3.8 cm x 4.4 symmetric size.  No  evidence of stone or mass or obstruction right kidney. No abnormal  collection surrounds right kidney.     No focal abnormality seen in the wall of the moderately distended  urinary bladder. The prevoid urinary bladder measures 7.7 cm x 9.3 cm x  2.1 cm size.     Left kidney measures 9.7 cm x 3.8 cm x 4.2 cm size. No evidence of stone  or mass or obstruction left kidney. No abnormal fluid collection  surrounds left kidney.        Impression:          1. The right and left kidneys show no evidence of stone or mass or  obstruction.  2. No focal abnormality seen in the wall the moderately distended  urinary bladder.     Electronically Signed By-DR. Jamie Sims MD On:3/14/2020 11:52  PM  This report was finalized on [id number] by DR. Jamie Sims MD.    CT Head Without Contrast [[id number]] Collected:  03/14/20 1811     Updated:  03/14/20 2014    Narrative:       EXAMINATION: CT HEAD WITHOUT CONTRAST    DATE: 3/14/2020 7:57 PM    INDICATION: Confusion, delirium    COMPARISON: None available at the time of this dictation.    TECHNIQUE: Noncontrast imaging obtained from the vertex to the skull base.  CT dose lowering techniques were used, to include: automated exposure control, adjustment for patient size, and or use of iterative reconstruction.?    FINDINGS:    Soft Tissues: No significant soft tissue abnormality.    Skull: No underlying skull fracture or radiopaque foreign body.    Sinuses: Paranasal sinuses are clear.    Mastoids: Mastoid air cells are clear.     Globes and Orbits: Globes and orbits are intact.    Brain: No acute hemorrhage.  No midline shift, masses, or mass effect.  No evidence of acute infarct by noncontrast CT.    Ventricles and Cisterns: Ventricular size and configuration is within normal limits. Basal cisterns are patent. No abnormal extra-axial fluid collection.    Senescent Changes: Mild/moderate volume loss. Mild white matter hypoattenuation favoring chronic  microvascular ischemic changes.    Remote infarct changes within the cerebellum bilaterally.        Impression:           1. No acute intracranial abnormalities by CT examination.    2. Mild to moderate volume loss and chronic microvascular ischemic changes. Arteriosclerosis.    3. Remote infarcts within the cerebellum bilaterally.      Electronically signed by:  Devante Irvin M.D.    3/14/2020 6:13 PM             Scheduled Meds:  clonazePAM 0.5 mg Oral TID   clopidogrel 75 mg Oral Daily   enoxaparin 30 mg Subcutaneous Daily   gabapentin 200 mg Oral TID   ipratropium-albuterol 3 mL Nebulization 4x Daily - RT   metoprolol succinate XL 50 mg Oral Daily   predniSONE 5 mg Oral Daily   sodium chloride 10 mL Intravenous Q12H   traZODone 50 mg Oral Nightly     Continuous Infusions:   PRN Meds:•  acetaminophen **OR** acetaminophen **OR** acetaminophen  •  ondansetron **OR** ondansetron  •  sodium chloride    Assessment/Plan   Assessment and Plan:         Acute renal failure (ARF) (CMS/[facility])    Benign hypertensive heart disease    Cardiomyopathy, ischemic    Chronic obstructive pulmonary disease (CMS/HCC)    Coronary artery disease    Hypertension    ASSESSMENT:  · Acute kidney injury: Patient BNP level is high echocardiogram is still pending but with bicarb level of 29 normal electrolytes patient acute kidney injury seems to be related to volume depletion but BNP level is still elevated.  · Congestive heart failure ejection fraction unknown repeat echocardiogram pending  · Acute coronary syndrome  · History of some dementia  · History of hypertension        Plan:      · Urine studies are sent with some CKD with proteinuria from 500 mg  · Electrolytes are acceptable  · Follow-up with echocardiogram  · Etiology of acute increase in creatinine seem to be multifactorial including low blood pressure, decreased p.o. intake some volume depletion in the presence of ACE inhibitor's.  No evidence of nonsteroidal anti-inflammatory  drugs or any contrast nephropathy at this time.  But renal functions are slowly improving for now  · Hemodynamically stable I will start some low-dose diuretics as BNP level is increasing   · Chest x-ray mainly consistent with COPD instead of volume overload that might be causing some increased shortness of breath   · We will wait for echocardiogram results   · Renal functions are slowly improving at this time  · Electrolytes and acid-base is acceptable not consistent with any volume overload  · We will check stool for C. difficile as patient is complaining of some diarrhea  · Follow-up with cardiology  · Will restart ACE inhibitor once creatinine close to baseline  •           Note started  by Zoran Do MD, 03/15/20, 10:17 AM.  [facility] kidney consultant

## 2020-03-15 NOTE — PLAN OF CARE
Problem: Patient Care Overview  Goal: Plan of Care Review  Outcome: Ongoing (interventions implemented as appropriate)  Flowsheets  Taken 3/15/2020 1348  Progress: improving  Taken 3/15/2020 0801  Plan of Care Reviewed With: patient;daughter  Note:   Pt alert and oriented. Vitals stable at this time. Plan of care discussed at bedside with pt and family. Will continue to monitor.

## 2020-03-15 NOTE — PLAN OF CARE
Problem: Patient Care Overview  Goal: Plan of Care Review  Outcome: Ongoing (interventions implemented as appropriate)  Flowsheets (Taken 3/15/2020 2748)  Plan of Care Reviewed With: patient;daughter  Outcome Summary: 79 yo female admitted with MS changes, confusion, lethargy and decreased appetite past month.  Pt found to be in acute renal failure.  Pt is very weak, requires A x 2 for short distance walking.  Pt incontinent of bowel, total assist for toileting.  Pt far from baseline, high risk for falls.  Will follow 3x/week, recommend IP rehab at d/c.

## 2020-03-15 NOTE — THERAPY EVALUATION
Patient Name: Dipti Perez  : 1940    MRN: [id number]                              Today's Date: 3/15/2020       Admit Date: 3/14/2020    Visit Dx: No diagnosis found.  Patient Active Problem List   Diagnosis   • Acute renal failure (ARF) (CMS/HCC)   • Benign hypertensive heart disease   • Cardiomyopathy, ischemic   • Chronic obstructive pulmonary disease (CMS/HCC)   • Coronary artery disease   • Hypertension     Past Medical History:   Diagnosis Date   • CAD (coronary artery disease)    • CHF (congestive heart failure) (CMS/HCC)    • COPD (chronic obstructive pulmonary disease) (CMS/HCC)    • Dementia (CMS/HCC)    • Elevated cholesterol    • Hyperlipidemia    • Hypertension      Past Surgical History:   Procedure Laterality Date   • CARDIAC SURGERY     • CORONARY ARTERY BYPASS GRAFT     • SPINE SURGERY       General Information     [facility] Name 03/15/20 1732          PT Evaluation Time/Intention    Document Type  evaluation  -     Mode of Treatment  physical therapy  -[facility] Name 03/15/20 173          General Information    Patient Profile Reviewed?  yes  -     Prior Level of Function  min assist:;ADL's dtr reports decline over past month, family helps with ADLs, at xmas was walking independently  -     Existing Precautions/Restrictions  fall  -     Barriers to Rehab  cognitive status  -     Row Name 03/15/20 173          Relationship/Environment    Lives With  child(royal), adult  -[facility] Name 03/15/20 1732          Resource/Environmental Concerns    Current Living Arrangements  home/apartment/condo  -[facility] Name 03/15/20 173          Cognitive Assessment/Intervention- PT/OT    Cognitive Assessment/Intervention Comment  confused, keeps eyes closed, needs constant stim and cues to participate.  states her name, partial   -     Row Name 03/15/20 173          Safety Issues, Functional Mobility    Safety Issues Affecting Function (Mobility)  ability to follow commands;awareness of  need for assistance;impulsivity;insight into deficits/self awareness;judgment  -     Impairments Affecting Function (Mobility)  balance;cognition;endurance/activity tolerance;postural/trunk control;strength  -       User Key  (r) = Recorded By, (t) = Taken By, (c) = Cosigned By    Initials Name Provider Type     Vashti Huber PT Physical Therapist        Mobility     Row Name 03/15/20 1734          Bed Mobility Assessment/Treatment    Bed Mobility Assessment/Treatment  supine-sit-supine  -     Supine-Sit-Supine [place] (Bed Mobility)  moderate assist (50% patient effort)  -     Assistive Device (Bed Mobility)  draw sheet;head of bed elevated  -     Comment (Bed Mobility)  poor effort from family  -     Row Name 03/15/20 1734          Transfer Assessment/Treatment    Comment (Transfers)  stood from bed, mod A x 2 on/off commode in bathroom  -     Row Name 03/15/20 1734          Sit-Stand Transfer    Sit-Stand [place] (Transfers)  moderate assist (50% patient effort);2 person assist  -     Row Name 03/15/20 1734          Gait/Stairs Assessment/Training    [place] Level (Gait)  moderate assist (50% patient effort);2 person assist  -     Assistive Device (Gait)  walker, front-wheeled  -     Distance in Feet (Gait)  20' x 2, ambulated pt to hallway using RW, pt very weak, began having BM on floor.  assisted pt to BR with mod A x 2, total assist for toileting hygiene  -     Deviations/Abnormal Patterns (Gait)  festinating/shuffling  -     Bilateral Gait Deviations  forward flexed posture  -     Comment (Gait/Stairs)  pt very weak and unsteady, poor ability to  walker, especially with L hand, shuffled steps, narrow [place]  -       User Key  (r) = Recorded By, (t) = Taken By, (c) = Cosigned By    Initials Name Provider Type    Vashti Alegria PT Physical Therapist        Obj/Interventions     Row Name 03/15/20 5279          General ROM    GENERAL ROM COMMENTS  BLE AAAROM  WFLs, pt not participating with ROM testing  -JH     Row Name 03/15/20 1755          MMT (Manual Muscle Testing)    General MMT Comments  unable to test, gross LEs 3/5  -JH     Row Name 03/15/20 1755          Static Sitting Balance    Level of [place] (Unsupported Sitting, Static Balance)  moderate assist, 50 to 74% patient effort;minimal assist, 75% patient effort;1 person assist  -     Sitting Position (Unsupported Sitting, Static Balance)  sitting on edge of bed  -JH     Row Name 03/15/20 1755          Dynamic Sitting Balance    Level of [place], Reaches Outside Midline (Sitting, Dynamic Balance)  moderate assist, 50 to 74% patient effort;1 person assist  -     Sitting Position, Reaches Outside Midline (Sitting, Dynamic Balance)  sitting on edge of bed  -JH     Row Name 03/15/20 1755          Static Standing Balance    Level of [place] (Supported Standing, Static Balance)  moderate assist, 50 to 74% patient effort;2 person assist  -JH     Row Name 03/15/20 1755          Dynamic Standing Balance    Level of [place], Reaches Outside Midline (Standing, Dynamic Balance)  moderate assist, 50 to 74% patient effort;2 person assist  -       User Key  (r) = Recorded By, (t) = Taken By, (c) = Cosigned By    Initials Name Provider Type     Vashti Huber, PT Physical Therapist        Goals/Plan     Row Name 03/15/20 1758          Bed Mobility Goal 1 (PT)    Activity/Assistive Device (Bed Mobility Goal 1, PT)  sit to supine/supine to sit  -     [place] Level/Cues Needed (Bed Mobility Goal 1, PT)  contact guard assist  -     Time Frame (Bed Mobility Goal 1, PT)  long term goal (LTG);2 weeks  -JH     Row Name 03/15/20 1758          Transfer Goal 1 (PT)    Activity/Assistive Device (Transfer Goal 1, PT)  bed-to-chair/chair-to-bed;sit-to-stand/stand-to-sit  -     [place] Level/Cues Needed (Transfer Goal 1, PT)  minimum assist (75% or more patient effort);1 person assist  -     Time  Frame (Transfer Goal 1, PT)  long term goal (LTG);2 weeks  -[facility] Name 03/15/20 1758          Gait Training Goal 1 (PT)    Activity/Assistive Device (Gait Training Goal 1, PT)  gait (walking locomotion);assistive device use;walker, rolling  -     [place] Level (Gait Training Goal 1, PT)  minimum assist (75% or more patient effort);1 person assist  -     Distance (Gait Goal 1, PT)  100'  -     Time Frame (Gait Training Goal 1, PT)  long term goal (LTG);2 weeks  -       User Key  (r) = Recorded By, (t) = Taken By, (c) = Cosigned By    Initials Name Provider Type     Vashti Huber, PT Physical Therapist        Clinical Impression     Row Name 03/15/20 1756          Pain Assessment    Additional Documentation  Pain Scale: FACES Pre/Post-Treatment (Group)  -JH     Row Name 03/15/20 1756          Pain Scale: FACES Pre/Post-Treatment    Pain: FACES Scale, Pretreatment  0-->no hurt  -     Pain: FACES Scale, Post-Treatment  0-->no hurt  -JH     Row Name 03/15/20 1756          Plan of Care Review    Plan of Care Reviewed With  patient;daughter  -     Outcome Summary  81 yo female admitted with MS changes, confusion, lethargy and decreased appetite past month.  Pt found to be in acute renal failure.  Pt is very weak, requires A x 2 for short distance walking.  Pt incontinent of bowel, total assist for toileting.  Pt far from baseline, high risk for falls.  Will follow 3x/week, recommend IP rehab at d/c.   -[facility] Name 03/15/20 1756          Physical Therapy Clinical Impression    Criteria for Skilled Interventions Met (PT Clinical Impression)  treatment indicated;yes  -     Rehab Potential (PT Clinical Summary)  fair, will monitor progress closely  -[facility] Name 03/15/20 1756          Vital Signs    O2 Delivery Pre Treatment  supplemental O2  -     O2 Delivery Intra Treatment  supplemental O2  -     O2 Delivery Post Treatment  supplemental O2  -JH     Row Name 03/15/20 2371           Positioning and Restraints    Pre-Treatment Position  in bed  -     Post Treatment Position  bed  -     In Bed  notified nsg;supine;exit alarm on;call light within reach;with family/caregiver  -       User Key  (r) = Recorded By, (t) = Taken By, (c) = Cosigned By    Initials Name Provider Type    Vashti Alegria PT Physical Therapist        Outcome Measures    No documentation.       Physical Therapy Education                 Title: PT OT SLP Therapies (In Progress)     Topic: Physical Therapy (In Progress)     Point: Mobility training (Done)     Description:   Instruct learner(s) on safety and technique for assisting patient out of bed, chair or wheelchair.  Instruct in the proper use of assistive devices, such as walker, crutches, cane or brace.              Patient Friendly Description:   It's important to get you on your feet again, but we need to do so in a way that is safe for you. Falling has serious consequences, and your personal safety is the most important thing of all.        When it's time to get out of bed, one of us or a family member will sit next to you on the bed to give you support.     If your doctor or nurse tells you to use a walker, crutches, a cane, or a brace, be sure you use it every time you get out of bed, even if you think you don't need it.    Learning Progress Summary           Family Acceptance, E,TB, VU by  at 3/15/2020 1753                   Point: Precautions (Done)     Description:   Instruct learner(s) on prescribed precautions during mobility and gait tasks              Learning Progress Summary           Family Acceptance, E,TB, VU by  at 3/15/2020 175                               User Key     Initials Effective Dates Name Provider Type [facility] 03[facility]01[facility] -  Vashti Huber, YONIS Physical Therapist PT              PT Recommendation and Plan  Planned Therapy Interventions (PT Eval): balance training, bed mobility training, gait training, transfer training,  postural re-education, strengthening  Outcome Summary/Treatment Plan (PT)  Anticipated Discharge Disposition (PT): inpatient rehabilitation facility  Plan of Care Reviewed With: patient, daughter  Outcome Summary: 81 yo female admitted with MS changes, confusion, lethargy and decreased appetite past month.  Pt found to be in acute renal failure.  Pt is very weak, requires A x 2 for short distance walking.  Pt incontinent of bowel, total assist for toileting.  Pt far from baseline, high risk for falls.  Will follow 3x/week, recommend IP rehab at d/c.      Time Calculation:   PT Charges     Row Name 03/15/20 1759             Time Calculation    Start Time  1045  -      Stop Time  1115  -      Time Calculation (min)  30 min  -      PT Received On  03/15/20  -      PT - Next Appointment  03/17/20  -      PT Goal Re-Cert Due Date  03/29/20  -         Time Calculation- PT    Total Timed Code Minutes- PT  20 minute(s)  -        User Key  (r) = Recorded By, (t) = Taken By, (c) = Cosigned By    Initials Name Provider Type     Vashti Huber PT Physical Therapist        Therapy Charges for Today     Code Description Service Date Service Provider Modifiers Qty    39667973059  PT EVAL MOD COMPLEXITY 2 3/15/2020 Vashti Huber, PT GP 1    [id number] HC PT THERAPEUTIC ACT EA 15 MIN 3/15/2020 Vashti Huber PT GP 2               Vashti Huber PT  3/15/2020

## 2020-03-15 NOTE — PLAN OF CARE
Problem: Patient Care Overview  Goal: Plan of Care Review  Outcome: Ongoing (interventions implemented as appropriate)  Flowsheets  Taken 3/15/2020 0444  Progress: improving  Taken 3/14/2020 2100  Plan of Care Reviewed With: patient;daughter  Note:   Patient less confused this shift, but still continues to pick and pull at lines.  Nephro d/c'd fluids.  Pt urine output low. New urine samples collected per testing orders.  Daughter remains at bedside.  Pt currently sleeping with home cpap in place.  No s/s of acute distress.  Will continue to monitor.  Goal: Individualization and Mutuality  Outcome: Ongoing (interventions implemented as appropriate)  Goal: Discharge Needs Assessment  Outcome: Ongoing (interventions implemented as appropriate)  Goal: Interprofessional Rounds/Family Conf  Outcome: Ongoing (interventions implemented as appropriate)     Problem: Fall Risk (Adult)  Goal: Identify Related Risk Factors and Signs and Symptoms  Outcome: Ongoing (interventions implemented as appropriate)  Goal: Absence of Fall  Outcome: Ongoing (interventions implemented as appropriate)     Problem: Pain, Chronic (Adult)  Goal: Identify Related Risk Factors and Signs and Symptoms  Outcome: Ongoing (interventions implemented as appropriate)  Goal: Acceptable Pain/Comfort Level and Functional Ability  Outcome: Ongoing (interventions implemented as appropriate)     Problem: Skin Injury Risk (Adult)  Goal: Identify Related Risk Factors and Signs and Symptoms  Outcome: Ongoing (interventions implemented as appropriate)  Goal: Skin Health and Integrity  Outcome: Ongoing (interventions implemented as appropriate)

## 2020-03-16 ENCOUNTER — APPOINTMENT (OUTPATIENT)
Dept: OTHER | Facility: HOSPITAL | Age: 80
End: 2020-03-16

## 2020-03-16 LAB
ANION GAP SERPL CALCULATED.3IONS-SCNC: 11 MMOL/L (ref 5–15)
BACTERIA SPEC AEROBE CULT: ABNORMAL
BASOPHILS # BLD AUTO: 0.1 10*3/MM3 (ref 0–0.2)
BASOPHILS NFR BLD AUTO: 0.6 % (ref 0–1.5)
BUN BLD-MCNC: 52 MG/DL (ref 8–23)
BUN/CREAT SERPL: 30.2 (ref 7–25)
CALCIUM SPEC-SCNC: 8.7 MG/DL (ref 8.6–10.5)
CHLORIDE SERPL-SCNC: 101 MMOL/L (ref 98–107)
CO2 SERPL-SCNC: 32 MMOL/L (ref 22–29)
CREAT BLD-MCNC: 1.72 MG/DL (ref 0.57–1)
DEPRECATED RDW RBC AUTO: 46.8 FL (ref 37–54)
EOSINOPHIL # BLD AUTO: 0.1 10*3/MM3 (ref 0–0.4)
EOSINOPHIL NFR BLD AUTO: 1.1 % (ref 0.3–6.2)
ERYTHROCYTE [DISTWIDTH] IN BLOOD BY AUTOMATED COUNT: 14.1 % (ref 12.3–15.4)
GFR SERPL CREATININE-BSD FRML MDRD: 29 ML/MIN/1.73
GLUCOSE BLD-MCNC: 101 MG/DL (ref 65–99)
GLUCOSE BLDC GLUCOMTR-MCNC: 123 MG/DL (ref 70–105)
HCT VFR BLD AUTO: 34.5 % (ref 34–46.6)
HGB BLD-MCNC: 11.6 G/DL (ref 12–15.9)
LYMPHOCYTES # BLD AUTO: 1.4 10*3/MM3 (ref 0.7–3.1)
LYMPHOCYTES NFR BLD AUTO: 14.2 % (ref 19.6–45.3)
MCH RBC QN AUTO: 31.4 PG (ref 26.6–33)
MCHC RBC AUTO-ENTMCNC: 33.8 G/DL (ref 31.5–35.7)
MCV RBC AUTO: 92.9 FL (ref 79–97)
MONOCYTES # BLD AUTO: 0.8 10*3/MM3 (ref 0.1–0.9)
MONOCYTES NFR BLD AUTO: 7.8 % (ref 5–12)
NEUTROPHILS # BLD AUTO: 7.7 10*3/MM3 (ref 1.7–7)
NEUTROPHILS NFR BLD AUTO: 76.3 % (ref 42.7–76)
NRBC BLD AUTO-RTO: 0.1 /100 WBC (ref 0–0.2)
NT-PROBNP SERPL-MCNC: 2286 PG/ML (ref 5–1800)
PHOSPHATE SERPL-MCNC: 3.3 MG/DL (ref 2.5–4.5)
PLATELET # BLD AUTO: 224 10*3/MM3 (ref 140–450)
PMV BLD AUTO: 7.1 FL (ref 6–12)
POTASSIUM BLD-SCNC: 4.1 MMOL/L (ref 3.5–5.2)
RBC # BLD AUTO: 3.71 10*6/MM3 (ref 3.77–5.28)
SODIUM BLD-SCNC: 144 MMOL/L (ref 136–145)
WBC NRBC COR # BLD: 10.1 10*3/MM3 (ref 3.4–10.8)

## 2020-03-16 PROCEDURE — 92610 EVALUATE SWALLOWING FUNCTION: CPT

## 2020-03-16 PROCEDURE — 97165 OT EVAL LOW COMPLEX 30 MIN: CPT

## 2020-03-16 PROCEDURE — 63710000001 PREDNISONE PER 5 MG: Performed by: NURSE PRACTITIONER

## 2020-03-16 PROCEDURE — 25010000002 ENOXAPARIN PER 10 MG: Performed by: NURSE PRACTITIONER

## 2020-03-16 PROCEDURE — 94799 UNLISTED PULMONARY SVC/PX: CPT

## 2020-03-16 PROCEDURE — 84100 ASSAY OF PHOSPHORUS: CPT | Performed by: INTERNAL MEDICINE

## 2020-03-16 PROCEDURE — 99232 SBSQ HOSP IP/OBS MODERATE 35: CPT | Performed by: HOSPITALIST

## 2020-03-16 PROCEDURE — 85025 COMPLETE CBC W/AUTO DIFF WBC: CPT | Performed by: INTERNAL MEDICINE

## 2020-03-16 PROCEDURE — 80048 BASIC METABOLIC PNL TOTAL CA: CPT | Performed by: INTERNAL MEDICINE

## 2020-03-16 PROCEDURE — 82962 GLUCOSE BLOOD TEST: CPT

## 2020-03-16 PROCEDURE — 83880 ASSAY OF NATRIURETIC PEPTIDE: CPT | Performed by: INTERNAL MEDICINE

## 2020-03-16 PROCEDURE — 97535 SELF CARE MNGMENT TRAINING: CPT

## 2020-03-16 RX ORDER — FUROSEMIDE 20 MG/1
10 TABLET ORAL DAILY
Status: DISCONTINUED | OUTPATIENT
Start: 2020-03-17 | End: 2020-03-17

## 2020-03-16 RX ADMIN — IPRATROPIUM BROMIDE AND ALBUTEROL SULFATE 3 ML: .5; 3 SOLUTION RESPIRATORY (INHALATION) at 07:45

## 2020-03-16 RX ADMIN — METOPROLOL SUCCINATE 50 MG: 50 TABLET, EXTENDED RELEASE ORAL at 09:32

## 2020-03-16 RX ADMIN — IPRATROPIUM BROMIDE AND ALBUTEROL SULFATE 3 ML: .5; 3 SOLUTION RESPIRATORY (INHALATION) at 19:25

## 2020-03-16 RX ADMIN — FUROSEMIDE 20 MG: 20 TABLET ORAL at 09:32

## 2020-03-16 RX ADMIN — Medication 10 ML: at 21:10

## 2020-03-16 RX ADMIN — CLOPIDOGREL BISULFATE 75 MG: 75 TABLET ORAL at 09:32

## 2020-03-16 RX ADMIN — GABAPENTIN 200 MG: 100 CAPSULE ORAL at 09:32

## 2020-03-16 RX ADMIN — CLONAZEPAM 0.5 MG: 0.5 TABLET ORAL at 09:32

## 2020-03-16 RX ADMIN — Medication 10 ML: at 09:32

## 2020-03-16 RX ADMIN — IPRATROPIUM BROMIDE AND ALBUTEROL SULFATE 3 ML: .5; 3 SOLUTION RESPIRATORY (INHALATION) at 15:27

## 2020-03-16 RX ADMIN — ENOXAPARIN SODIUM 30 MG: 30 INJECTION SUBCUTANEOUS at 18:08

## 2020-03-16 RX ADMIN — PREDNISONE 5 MG: 5 TABLET ORAL at 09:32

## 2020-03-16 NOTE — PROGRESS NOTES
"      [facility] Medicine Services Daily Progress Note          Hospitalist Team  LOS 1 days      Patient Care Team:  Provider, No Known as PCP - General    Patient Location: 267/1      Subjective   Subjective     Chief Complaint / Subjective  Altered mental status      Brief Synopsis of Hospital Course/HPI  Patient is an 80-year-old female with past medical history of coronary artery disease, CHF, cardiomyopathy, CABG, COPD, hyperlipidemia, hypertension, and dementia who was brought from an outlying hospital after family took her there for altered mental status.  Patient is a poor historian and can give very little details.  Family at the bedside state that she had been declining gradually over the past month with increased confusion, lethargy, poor appetite, and malaise.  They deny any fevers, chills, flulike symptoms or other recent illness.  She denies any chest pain, shortness of air, nausea, vomiting, diarrhea.  She has no past renal history.  She has an extensive cardiac history with a history of four-vessel bypass done by Dr. Rankin 20 years ago.  She has not followed up with him or any cardiologist since.      Date::    3/15/2020: No acute events.  Family unavailable for discussion at the time of my visit.  Patient has no new complaints.      Review of Systems   Unable to perform ROS: dementia         Objective   Objective      Vital Signs  Temp:  [98.1 °F (36.7 °C)-99 °F (37.2 °C)] 98.1 °F (36.7 °C)  Heart Rate:  [66-95] 73  Resp:  [14-25] 14  BP: ()/(43-78) 106/57  Oxygen Therapy  SpO2: 98 %  Pulse Oximetry Type: Continuous  Device (Oxygen Therapy): nasal cannula  Flow (L/min): 2  Oxygen Concentration (%): 28  Flowsheet Rows      First Filed Value   Admission Height  160 cm (63\") Documented at 03/14/2020 0500   Admission Weight  54.5 kg (120 lb 2.4 oz) Documented at 03/14/2020 0227        Intake & Output (last 3 days)       03/13 0701 - 03/14 0700 03/14 0701 - 03/15 0700 03/15 0701 " - 03/16 0700    P.O.  250 580    I.V. (mL/kg) 1000 (18.3) 900 (17.1)     IV Piggyback 1000      Total Intake(mL/kg) 2000 (36.7) 1150 (21.9) 580 (11.1)    Urine (mL/kg/hr) 200 600 (0.5)     Stool  0     Total Output 200 600     Net +1800 +550 +580           Urine Unmeasured Occurrence  1 x     Stool Unmeasured Occurrence  3 x 4 x        Lines, Drains & Airways    Active LDAs     Name:   Placement date:   Placement time:   Site:   Days:    Peripheral IV 03/13/20 2200 Right Antecubital   03/13/20 2200    Antecubital   2                  Physical Exam:    Physical Exam    General: well-developed and well-nourished, NAD  HEENT: Normocephalic and atraumatic.  EOMI, PERRLA poor dentition  Heart: Normal rate and regular rhythm. No murmur   Chest: Effort normal and breath sounds normal. No wheezing, rales or rhonchi  Abdominal: Soft. NT/ND. Bowel sounds present  Musculoskeletal: Normal ROM.  No edema. No calf tenderness.  Neurological: AAOx3, but very confused as to why she is here, no focal deficits although she seems to have some mild right upper extremity weakness  Skin: Skin is warm and dry. No rash  Psychiatric: Normal mood and affect.      Procedures:           Results Review:     I reviewed the patient's new clinical results.    Results from last 7 days   Lab Units 03/15/20  0443 03/14/20  0437   WBC 10*3/mm3 9.80 14.50*   HEMOGLOBIN g/dL 11.0* 10.6*   HEMATOCRIT % 33.2* 32.4*   PLATELETS 10*3/mm3 230 215     Results from last 7 days   Lab Units 03/15/20  0342 03/14/20  1852 03/14/20  0253   SODIUM mmol/L 140 140 134*   POTASSIUM mmol/L 4.2 4.3 4.0   CHLORIDE mmol/L 98 95* 90*   CO2 mmol/L 26.0 30.0* 29.0   BUN mg/dL 61* 65* 66*   CREATININE mg/dL 2.92* 3.83* 5.54*   GLUCOSE mg/dL 97 164* 84   ALBUMIN g/dL 3.10*  --  3.40*   BILIRUBIN mg/dL 0.5  --  0.7   ALK PHOS U/L 81  --  100   AST (SGOT) U/L 37*  --  43*   ALT (SGPT) U/L 13  --  14   LIPASE U/L  --   --  5*   CALCIUM mg/dL 8.6 8.1* 8.3*     Cr Clearance  Estimated Creatinine Clearance: 12.7 mL/min (A) (by C-G formula based on SCr of 2.92 mg/dL (H)).    Coag       HbA1C No results found for: HGBA1C  Blood Glucose   Results from last 7 days   Lab Units 03/15/20  1651   GLUCOSE mg/dL 128*       Troponin Results from last 7 days   Lab Units 03/15/20  0342 03/14/20  0253   TROPONIN T ng/mL  --  0.052*   PROBNP pg/mL 5,684.0* 3,535.0*     Lipids  Lab Results   Component Value Date    CHOL 175 03/14/2020    TRIG 139 03/14/2020    HDL 53 03/14/2020    LDL 94 03/14/2020       UA    Results from last 7 days   Lab Units 03/14/20  0620   NITRITE UA  Negative   WBC UA /HPF 6-12*   BACTERIA UA /HPF Trace*   SQUAM EPITHEL UA /HPF 0-2   URINECX  <25,000 CFU/mL Gram Negative Bacilli*       Microbiology Results from last 7 days   Lab Units 03/14/20  0620   URINECX  <25,000 CFU/mL Gram Negative Bacilli*       ABG        EKG  No orders to display       Imaging:  Ct Head Without Contrast    Result Date: 3/14/2020  1. No acute intracranial abnormalities by CT examination. 2. Mild to moderate volume loss and chronic microvascular ischemic changes. Arteriosclerosis. 3. Remote infarcts within the cerebellum bilaterally. Electronically signed by:  Devante Irvin M.D.  3/14/2020 6:13 PM    Xr Chest 1 View    Result Date: 3/15/2020  No acute infiltrate is appreciated.  Electronically Signed By-Dr. Luis Rock MD On:3/15/2020 7:08 AM This report was finalized on [id number] by Dr. Luis Rock MD.    Us Renal Bilateral    Result Date: 3/14/2020   1. The right and left kidneys show no evidence of stone or mass or obstruction. 2. No focal abnormality seen in the wall the moderately distended urinary bladder.  Electronically Signed By-DR. Jamie Sims MD On:3/14/2020 11:52 PM This report was finalized on [id number] by DR. Jamie Sims MD.    Results for orders placed during the hospital encounter of 03/14/20   Adult Transthoracic Echo Complete W/ Cont if Necessary Per  Protocol    Narrative · Estimated EF = 60%.  · Left ventricular systolic function is normal.     Indications  Hypertension    Technically satisfactory study.  Mitral valve is thickened with adequate opening motion.  Mitral annular   calcification is present.  Tricuspid valve is structurally normal.  Aortic valve is thickened with adequate opening motion.  Pulmonic valve could not be well visualized.  No evidence for mitral tricuspid or aortic regurgitation is seen by   Doppler study.  Left atrium is normal in size.  Right atrium is normal in size.  Left ventricle is normal in size and contractility with ejection fraction   of 60%.  Right ventricle is normal in size.  Atrial septum is intact.  Aorta is normal.  No pericardial effusion or intracardiac thrombus is seen.    Impression  Thickened mitral and aortic valves with adequate opening motion.  Left ventricular size and contractility is normal with ejection fraction   of 60%  No pericardial effusion or intracardiac thrombus is seen.              Xrays, labs reviewed personally by physician.    Medication Review:   I have reviewed the patient's current medication list      Scheduled Meds    clonazePAM 0.5 mg Oral TID   clopidogrel 75 mg Oral Daily   enoxaparin 30 mg Subcutaneous Daily   furosemide 20 mg Oral Daily   gabapentin 200 mg Oral TID   ipratropium-albuterol 3 mL Nebulization 4x Daily - RT   metoprolol succinate XL 50 mg Oral Daily   predniSONE 5 mg Oral Daily   sodium chloride 10 mL Intravenous Q12H   traZODone 50 mg Oral Nightly       Meds Infusions       Meds PRN  •  acetaminophen **OR** acetaminophen **OR** acetaminophen  •  ondansetron **OR** ondansetron  •  sodium chloride      Assessment/Plan   Assessment/Plan     Active Hospital Problems    Diagnosis  POA   • Acute renal failure (ARF) (CMS/HCC) [N17.9]  Yes   • Cardiomyopathy, ischemic [I25.5]  Yes   • Chronic obstructive pulmonary disease (CMS/HCC) [J44.9]  Yes   • Coronary artery disease [I25.10]   Yes   • Hypertension [I10]  Yes   • Benign hypertensive heart disease [I11.9]  Yes      Resolved Hospital Problems   No resolved problems to display.       MEDICAL DECISION MAKING COMPLEXITY BY PROBLEM:     Acute renal failure  - Improving greatly since admission  - No previous history of dialysis or renal disease  - Nephrology following: Suspect volume depletion although BNP is elevated  - Urine studies pending, proteinuria noted    Altered mental status  - Suspect acute metabolic encephalopathy that is multifactorial  - Possible uremia, possible UTI, underlying dementia with unknown baseline  - Continue to monitor closely  - Check CT head without contrast    Gram-negative bacteriuria  - Gram-negative bacilli in urine culture however colony count is less than 25,000 which could be colonization/contaminant, or a partially treated urine culture  - Follow-up on urine culture result  - No signs or symptoms of sepsis; doubt active infection is present  - s/p Rocephin in ED; currently not on any antibiotic    Leukocytosis  - Suspect this is reactive leukocytosis versus leukocytosis related to urinary tract issue versus diarrhea  - Currently leukocytosis has normalized fairly quickly with no other intervention  - Continue to monitor CBC with WBC and watch for signs or symptoms of infection    CAD/CHF/CABG  -Managed by primary care  -Has not seen a cardiologist since her original surgery done 20 years ago by Dr. Rankin  -No records or other history available  -BNP 3535, order 2D echo to check LV function, ?  Fluid overload versus exacerbation, BNP continues to increase, now well over 5000  -Troponin elevated 0.052, likely elevated due to renal failure, patient has no chest pain, could be related to fluid overload/pulmonary edema  -2D echo reviewed: With normal LV function  - Discussed with nephrology will proceed with cardiology evaluation, given elevated BNP and patient lost to cardiology follow-up  - Continue metoprolol  and Plavix    Hypertension  - Chronic; currently labile  - Patient has had some episodes of hypotension  - Continue metoprolol; lisinopril on hold due to renal function    COPD without exacerbation  - Underlying sleep apnea as well; continue home CPAP at night  - Continue supplemental oxygen at 2 L via nasal cannula as she has at home  - Continue duo nebs, prednisone    Chronic pain  - Continue gabapentin      VTE Prophylaxis  Mechanical Order History:     None      Pharmalogical Order History:     Ordered     Dose Route Frequency Stop    03/14/20 0240  enoxaparin (LOVENOX) syringe 30 mg      30 mg SC Daily --          Code Status  Code Status and Medical Interventions:   Ordered at: 03/14/20 0241     Code Status:    CPR     Medical Interventions (Level of Support Prior to Arrest):    Full       Discharge Planning    To be determined pending clinical course, will have PT/OT evaluate the patient.    Destination      Coordination has not been started for this encounter.      Durable Medical Equipment      Coordination has not been started for this encounter.      Dialysis/Infusion      Coordination has not been started for this encounter.      Home Medical Care      Coordination has not been started for this encounter.      Therapy      Coordination has not been started for this encounter.      Community Resources      Coordination has not been started for this encounter.            Electronically signed by Nury Maldonado MD, 03/15/20, 23:53.  [facility] Hospitalist Team

## 2020-03-16 NOTE — THERAPY EVALUATION
Acute Care - Occupational Therapy Initial Evaluation   [place]     Patient Name: Dipti Perez  : 1940  MRN: [id number]  Today's Date: 3/16/2020             Admit Date: 3/14/2020     No diagnosis found.  Patient Active Problem List   Diagnosis   • Acute renal failure (ARF) (CMS/HCC)   • Benign hypertensive heart disease   • Cardiomyopathy, ischemic   • Chronic obstructive pulmonary disease (CMS/HCC)   • Coronary artery disease   • Hypertension     Past Medical History:   Diagnosis Date   • CAD (coronary artery disease)    • CHF (congestive heart failure) (CMS/HCC)    • COPD (chronic obstructive pulmonary disease) (CMS/HCC)    • Dementia (CMS/HCC)    • Elevated cholesterol    • Hyperlipidemia    • Hypertension      Past Surgical History:   Procedure Laterality Date   • CARDIAC SURGERY     • CORONARY ARTERY BYPASS GRAFT     • SPINE SURGERY            OT ASSESSMENT FLOWSHEET (last 12 hours)      Occupational Therapy Evaluation     Row Name 20 0939                   OT Evaluation Time/Intention    Subjective Information  complains of;fatigue  -SR        Document Type  evaluation  -SR        Mode of Treatment  individual therapy  -SR        Comment  Pt lives at home with daughter, though pt reports she lives at home with . Per PT not pt has been declining with mobility and ADLs over the past few months.  Prior to Fredo she was walking independently.    -SR           General Information    Pertinent History of Current Functional Problem  Pt admitted to hospital for mental status changes.  Found to be in acute renal failure.    -SR        Existing Precautions/Restrictions  fall  -SR           Relationship/Environment    Lives With  child(royal), adult  -SR        Family Caregiver if Needed  child(royal), adult  -SR           Resource/Environmental Concerns    Current Living Arrangements  home/apartment/condo  -SR           Cognitive Assessment/Intervention- PT/OT    Orientation Status (Cognition)   oriented to;person;place;disoriented to;time;situation  -SR        Cognitive Assessment/Intervention Comment  Pt provides incorrect information regarding PLOF.  States she is completely indepenent and lives with her .    -SR           Safety Issues, Functional Mobility    Safety Issues Affecting Function (Mobility)  ability to follow commands;insight into deficits/self awareness;impulsivity;judgment;problem solving;sequencing abilities  -SR        Impairments Affecting Function (Mobility)  balance;cognition;grasp;range of motion (ROM);strength;shortness of breath  -SR           Bed Mobility Assessment/Treatment    Bed Mobility Assessment/Treatment  supine-sit  -SR        Supine-Sit [place] (Bed Mobility)  minimum assist (75% patient effort)  -SR        Supine-Sit-Supine [place] (Bed Mobility)  minimum assist (75% patient effort)  -SR           Functional Mobility    Functional Mobility- Ind. Level  moderate assist (50% patient effort)  -SR        Functional Mobility- Device  rolling walker  -SR        Functional Mobility- Comment  Pt has difficulty following commands.  Walks in significant forward flexed posture and difficulty managing walker.  Upon returning from bathroom O2 checked and was at 74%.  O2 behind bed and was placed on pt.  O2 sats improved WFL in <2 mins.     -SR           Transfer Assessment/Treatment    Transfer Assessment/Treatment  sit-stand transfer  -SR           Sit-Stand Transfer    Sit-Stand [place] (Transfers)  minimum assist (75% patient effort)  -SR        Assistive Device (Sit-Stand Transfers)  walker, front-wheeled  -SR           ADL Assessment/Intervention    BADL Assessment/Intervention  toileting;lower body dressing  -SR           Lower Body Dressing Assessment/Training    Lower Body Dressing [place] Level  don;undergarment;dependent (less than 25% patient effort)  -SR        Comment (Lower Body Dressing)  Pt picks at breif and begins to pull inside out.  Dependent for assist to don.    -SR           Toileting Assessment/Training    [place] Level (Toileting)  dependent (less than 25% patient effort)  -SR           General ROM    GENERAL ROM COMMENTS  BUE WFL   -SR           MMT (Manual Muscle Testing)    General MMT Comments  BUE grossly 3+/5  -SR           Static Sitting Balance    Level of [place] (Unsupported Sitting, Static Balance)  minimal assist, 75% patient effort  -SR           Dynamic Sitting Balance    Level of [place], Reaches Outside Midline (Sitting, Dynamic Balance)  minimal assist, 75% patient effort  -SR           Static Standing Balance    Level of [place] (Supported Standing, Static Balance)  minimal assist, 75% patient effort  -SR           Dynamic Standing Balance    Level of [place], Reaches Outside Midline (Standing, Dynamic Balance)  moderate assist, 50 to 74% patient effort  -SR           Sensory Assessment/Intervention    Sensory General Assessment  no sensation deficits identified  -SR           Positioning and Restraints    Pre-Treatment Position  in bed  -SR        Post Treatment Position  bed  -SR        In Bed  call light within reach;encouraged to call for assist;exit alarm on  -SR           Pain Scale: FACES Pre/Post-Treatment    Pain: FACES Scale, Pretreatment  0-->no hurt  -SR        Pain: FACES Scale, Post-Treatment  0-->no hurt  -SR           Plan of Care Review    Outcome Summary  Pt is 79 y/o female admitted for AMS, lethargy, decreased appetite, and wekaness.  She was found to be in acute renal failure.  She is confused and severly weak.  Requires max-total assist for all ADLs.  She is not safe to return home and will require IP rehab at discharge.   -SR           Clinical Impression (OT)    Criteria for Skilled Therapeutic Interventions Met (OT Eval)  yes;treatment indicated  -SR        Rehab Potential (OT Eval)  good, to achieve stated therapy goals  -SR        Therapy Frequency (OT Eval)  3 times/wk   -SR        Predicted Duration of Therapy Intervention (Therapy Eval)  Until discharge  -SR        Anticipated Discharge Disposition (OT)  inpatient rehabilitation facility  -SR           OT Goals    Bathing Goal Selection (OT)  --  -SR        Dressing Goal Selection (OT)  dressing, OT goal 1  -SR        Toileting Goal Selection (OT)  toileting, OT goal 1  -SR           Dressing Goal 1 (OT)    Activity/Assistive Device (Dressing Goal 1, OT)  dressing skills, all  -SR        [place]/Cues Needed (Dressing Goal 1, OT)  2 person assist  -SR        Time Frame (Dressing Goal 1, OT)  2 weeks  -SR           Toileting Goal 1 (OT)    Activity/Device (Toileting Goal 1, OT)  toileting skills, all  -SR        [place] Level/Cues Needed (Toileting Goal 1, OT)  2 person assist  -SR        Time Frame (Toileting Goal 1, OT)  2 weeks  -SR          User Key  (r) = Recorded By, (t) = Taken By, (c) = Cosigned By    Initials Name Effective Dates    SR Marion Chopra, OT 03/01/19 -                OT Recommendation and Plan  Outcome Summary/Treatment Plan (OT)  Anticipated Discharge Disposition (OT): inpatient rehabilitation facility  Therapy Frequency (OT Eval): 3 times/wk  Outcome Summary: Pt is 81 y/o female admitted for AMS, lethargy, decreased appetite, and wekaness.  She was found to be in acute renal failure.  She is confused and severly weak.  Requires max-total assist for all ADLs.  She is not safe to return home and will require IP rehab at discharge.         Time Calculation:   Time Calculation- OT     Row Name 03/16/20 1612             Time Calculation- OT    OT Start Time  0939  -SR      OT Stop Time  1001  -SR      OT Time Calculation (min)  22 min  -SR      Total Timed Code Minutes- OT  8 minute(s)  -SR      OT Non-Billable Time (min)  14 min  -SR      OT Received On  03/16/20  -SR      OT - Next Appointment  03/18/20  -SR      OT Goal Re-Cert Due Date  03/30/20  -SR        User Key  (r) = Recorded By, (t) =  Taken By, (c) = Cosigned By    Initials Name Provider Type    SR Marion Chopra, OT Occupational Therapist        Therapy Charges for Today     Code Description Service Date Service Provider Modifiers Qty    [id number]  OT EVAL LOW COMPLEXITY 3 3/16/2020 Marion Chopra OT GO 1    [id number]  OT SELF CARE/MGMT/TRAIN EA 15 MIN 3/16/2020 Marion Chopra, OT GO 1               Marion Chopra OT  3/16/2020

## 2020-03-16 NOTE — THERAPY EVALUATION
Acute [facility] - Speech Language Pathology Initial Evaluation   [place]     Patient Name: Dipti Perez  : 1940  MRN: [id number]  Today's Date: 3/16/2020               Admit Date: 3/14/2020     Visit Dx:  No diagnosis found.  Patient Active Problem List   Diagnosis   • Acute renal failure (ARF) (CMS/HCC)   • Benign hypertensive heart disease   • Cardiomyopathy, ischemic   • Chronic obstructive pulmonary disease (CMS/HCC)   • Coronary artery disease   • Hypertension     Past Medical History:   Diagnosis Date   • CAD (coronary artery disease)    • CHF (congestive heart failure) (CMS/HCC)    • COPD (chronic obstructive pulmonary disease) (CMS/HCC)    • Dementia (CMS/HCC)    • Elevated cholesterol    • Hyperlipidemia    • Hypertension      Past Surgical History:   Procedure Laterality Date   • CARDIAC SURGERY     • CORONARY ARTERY BYPASS GRAFT     • SPINE SURGERY              Cyn Hatfield, SLP  3/16/2020 and Acute [facility] - Speech Language Pathology   Swallow Initial Evaluation  [place]     Patient Name: Dipti Perez  : 1940  MRN: [id number]  Today's Date: 3/16/2020               Admit Date: 3/14/2020    Visit Dx:   No diagnosis found.  Patient Active Problem List   Diagnosis   • Acute renal failure (ARF) (CMS/HCC)   • Benign hypertensive heart disease   • Cardiomyopathy, ischemic   • Chronic obstructive pulmonary disease (CMS/HCC)   • Coronary artery disease   • Hypertension     Past Medical History:   Diagnosis Date   • CAD (coronary artery disease)    • CHF (congestive heart failure) (CMS/HCC)    • COPD (chronic obstructive pulmonary disease) (CMS/HCC)    • Dementia (CMS/HCC)    • Elevated cholesterol    • Hyperlipidemia    • Hypertension      Past Surgical History:   Procedure Laterality Date   • CARDIAC SURGERY     • CORONARY ARTERY BYPASS GRAFT     • SPINE SURGERY          SWALLOW EVALUATION (last 72 hours)   Patient is an 80-year-old female with past medical history of coronary artery disease,  CHF, cardiomyopathy, CABG, COPD, hyperlipidemia, hypertension, and dementia who was brought from an outlying hospital after family took her there for altered mental status. Patient is of poor historian and can give very little details. Family at the bedside state that she had been declining gradually over the past month with increased confusion, lethargy, poor appetite, and malaise. They will deny any fevers, chills, flulike symptoms or other recent illness. She denies any chest pain, shortness of air, nausea, vomiting, diarrhea.     DATE: 3/14/2020 7:57 PM     INDICATION: Confusion, delirium     COMPARISON: None available at the time of this dictation.     TECHNIQUE: Noncontrast imaging obtained from the vertex to the skull base.  CT dose lowering techniques were used, to include: automated exposure control, adjustment for patient size, and or use of iterative reconstruction.?     FINDINGS:     Soft Tissues: No significant soft tissue abnormality.     Skull: No underlying skull fracture or radiopaque foreign body.     Sinuses: Paranasal sinuses are clear.     Mastoids: Mastoid air cells are clear.      Globes and Orbits: Globes and orbits are intact.     Brain: No acute hemorrhage.  No midline shift, masses, or mass effect.  No evidence of acute infarct by noncontrast CT.     Ventricles and Cisterns: Ventricular size and configuration is within normal limits. Basal cisterns are patent. No abnormal extra-axial fluid collection.     Senescent Changes: Mild/moderate volume loss. Mild white matter hypoattenuation favoring chronic microvascular ischemic changes.     Remote infarct changes within the cerebellum bilaterally.        IMPRESSION:        1. No acute intracranial abnormalities by CT examination.     2. Mild to moderate volume loss and chronic microvascular ischemic changes. Arteriosclerosis.     3. Remote infarcts within the cerebellum bilaterally.       SLP Adult Swallow Evaluation     Row Name 03/16/20 5421        General Information    Current Method of Nutrition Soft to chew, regular consistency  -       General Eating/Swallowing Observations    Respiratory Support Currently in Use  room air  -    Eating/Swallowing Skills  assisted  by SLP  -    Positioning During Eating  upright 90 degree;upright in bed   -    Utensils Used  spoon  -    Consistencies Trialed  thin liquids  -       Clinical Swallow Eval    Oral Prep Phase  impaired  -    Oral Transit  impaired  -    Pharyngeal Phase  suspected pharyngeal impairment  -    Clinical Swallow Evaluation Summary  The patient was seen bedside today for a re-evaluation of swallow. She was positioned on her left side slightly with HOB elevated to approximately 90 degrees. PT assessed with thin water by spoon/straw, puree (applesauce) and mech soft (Fig Duncan). Pt able to self feed most trials with min assist from clinician. Pt exhibits slow/reduced oral transit/control, increased oral transit time and dec ability to clear oral cavity. Pt did not want to attempt regular solids stating she was unable to tolerate. Pt asking for meats to be very soft/ground.    -       Clinical Impression    SLP Swallowing Diagnosis  Moderate oral dysphagia.  -    Functional Impact  risk of aspiration/pneumonia;risk of malnutrition;risk of dehydration  -    Rehab Potential/Prognosis, Swallowing  adequate, monitor progress closely  -    Swallow Criteria for Skilled Therapeutic Interventions Met  demonstrates skilled criteria  -       Recommendations    Therapy Frequency (Swallow)  5 days per week  -    SLP Diet Recommendation  Mech soft/thin liquids -    Recommended Diagnostics  Follow up meal assessmentSM    Recommended Precautions and Strategies  Pt will be up at full 90 degrees for all po.  ST to complete full meal assessment  Pt will take small bites/sips and eat at a slow rate of intake.        Swallow Goals (SLP)    Oral Nutrition/Hydration Goal Selection  (SLP)  oral nutrition/hydration, SLP goal 1;oral nutrition/hydration, SLP goal 2;oral nutrition/hydration, SLP goal (free text)  -SM       Oral Nutrition/Hydration Goal 1 (SLP)    Oral Nutrition/Hydration Goal 1, SLP  The patient will maximize swallow function for least restrictive and safest po diet, using safe swallow compensations as tolerated and minimizing negative outcomes associated with dysphagia-SM    Time Frame (Oral Nutrition/Hydration Goal 1, SLP)  By discharge  -SM       Oral Nutrition/Hydration Goal 2 (SLP)    Oral Nutrition/Hydration Goal 2, SLP  PT/caregiver teaching with additional goals as indicated  -SM    Time Frame (Oral Nutrition/Hydration Goal 2, SLP)  by discharge  -SM       Oral Nutrition/Hydration Goal (SLP)    Oral Nutrition/Hydration Goal, SLP  Re-evaluation of swallow at bedside with continued therapy as indicated  -SM    Time Frame (Oral Nutrition/Hydration Goal, SLP)  by discharge  -SM      User Key  (r) = Recorded By, (t) = Taken By, (c) = Cosigned By    Initials Name Effective Dates    Cyn Trevino SLP 03/01/19 -           EDUCATION  The patient has been educated in the following areas:   Communication Impairment Dysphagia (Swallowing Impairment) Oral Care/Hydration NPO rationale.    SLP Recommendation and Plan                       FRANK Spangler  3/16/2020

## 2020-03-16 NOTE — PROGRESS NOTES
Discharge Planning Assessment   [place]     Patient Name: Dipti Perez  MRN: [id number]  Today's Date: 3/16/2020    Admit Date: 3/14/2020    Discharge Needs Assessment     Row Name 03/16/20 1514       Living Environment    Lives With  -- Lives with daughter ( Dary )  who is her caregiver and POA    Current Living Arrangements  home/apartment/condo    Primary Care Provided by  child(royal)    Able to Return to Prior Arrangements  no PT recommeends IP Rehab and daughter chose [facility] - Aware of no visitor policy - SW informed       Resource/Environmental Concerns    Resource/Environmental Concerns  none    Transportation Concerns  car, none       Transition Planning    Patient/Family Anticipates Transition to  inpatient rehabilitation facility    Patient/Family Anticipated Services at Transition  rehabilitation services    Transportation Anticipated  family or friend will provide;health plan transportation       Discharge Needs Assessment    Concerns to be Addressed  discharge planning    Equipment Currently Used at Home  walker, rolling;commode;oxygen;nebulizer Uses oxygen cont at 2 L from [employer]        Discharge Plan     Row Name 03/16/20 1428       Plan    Plan  [facility] referral made,decision pending,PASRR approved,no precert needed        Destination      Service Provider Request Status Selected Services Address Phone Number Fax Number    [facility] Pending - Request Sent N/A [address] [place] [address] [phone]    [facility] Declined  incorrect referral N/A [address] [place] [address] [phone]             Demographic Summary     Row Name 03/16/20 1514       General Information    Admission Type  inpatient    Arrived From  emergency department    Referral Source  admission list    Reason for Consult  discharge planning    Preferred Language  English        Functional Status     Row Name 03/16/20 1519        Functional Status, IADL    Medications  completely dependent    Meal Preparation  completely dependent    Housekeeping  completely dependent    Laundry  completely dependent    Shopping  completely dependent          DC Barriers - Monitor Renal Function, Pending Echo    Sissy Mckoy RN, CM  Office Phone [phone]  Cell [phone]

## 2020-03-16 NOTE — PROGRESS NOTES
Continued Stay Note   [place]     Patient Name: Dipti Perez  MRN: [id number]  Today's Date: 3/16/2020    Admit Date: 3/14/2020    Discharge Plan     Row Name 03/16/20 1428       Plan    Plan  [facility] referral made,decision pending,PASRR approved,no precert needed      Referral made per Cm request.    No social barrier to dc identified.    Darling Black, [facility] [place], LSW  [phone]

## 2020-03-16 NOTE — PLAN OF CARE
Problem: Patient Care Overview  Goal: Plan of Care Review  Outcome: Ongoing (interventions implemented as appropriate)  Flowsheets (Taken 3/16/2020 0428)  Progress: no change  Plan of Care Reviewed With: patient  Goal: Individualization and Mutuality  Outcome: Ongoing (interventions implemented as appropriate)  Goal: Discharge Needs Assessment  Outcome: Ongoing (interventions implemented as appropriate)     Problem: Fall Risk (Adult)  Goal: Identify Related Risk Factors and Signs and Symptoms  Outcome: Outcome(s) achieved  Flowsheets (Taken 3/16/2020 0428)  Related Risk Factors (Fall Risk): age-related changes; gait/mobility problems; slippery/uneven surfaces  Goal: Absence of Fall  Outcome: Ongoing (interventions implemented as appropriate)  Flowsheets (Taken 3/16/2020 0428)  Absence of Fall: making progress toward outcome     Problem: Pain, Chronic (Adult)  Goal: Identify Related Risk Factors and Signs and Symptoms  Outcome: Outcome(s) achieved  Flowsheets (Taken 3/16/2020 0428)  Related Risk Factors (Chronic Pain): disease process; knowledge deficit  Signs and Symptoms (Chronic Pain): abnormal posturing/positioning  Goal: Acceptable Pain/Comfort Level and Functional Ability  Outcome: Ongoing (interventions implemented as appropriate)  Flowsheets (Taken 3/16/2020 0428)  Acceptable Pain/Comfort Level and Functional Ability: making progress toward outcome     Problem: Skin Injury Risk (Adult)  Goal: Identify Related Risk Factors and Signs and Symptoms  Outcome: Outcome(s) achieved  Flowsheets (Taken 3/16/2020 0428)  Related Risk Factors (Skin Injury Risk): advanced age; cognitive impairment; hospitalization prolonged; mobility impaired  Goal: Skin Health and Integrity  Outcome: Ongoing (interventions implemented as appropriate)  Flowsheets (Taken 3/16/2020 0428)  Skin Health and Integrity: making progress toward outcome     Problem: Confusion, Acute (Adult)  Goal: Identify Related Risk Factors and Signs and  Symptoms  Outcome: Outcome(s) achieved  Flowsheets (Taken 3/16/2020 0428)  Related Risk Factors (Acute Confusion): advanced age; cognitive impairment; infection; metabolic abnormalities; mobility decreased  Goal: Cognitive/Functional Impairments Minimized  Outcome: Ongoing (interventions implemented as appropriate)  Flowsheets (Taken 3/16/2020 0428)  Cognitive/Functional Impairments Minimized: making progress toward outcome  Goal: Safety  Outcome: Ongoing (interventions implemented as appropriate)  Flowsheets (Taken 3/16/2020 0428)  Safety: making progress toward outcome     Problem: Renal Failure/Kidney Injury, Acute (Adult)  Goal: Signs and Symptoms of Listed Potential Problems Will be Absent, Minimized or Managed (Renal Failure/Kidney Injury, Acute)  Outcome: Ongoing (interventions implemented as appropriate)  Flowsheets (Taken 3/16/2020 0428)  Problems Assessed (Acute Renal Failure/Kidney Injury): electrolyte imbalance; fluid imbalance  Problems Present (ARF/Kidney Injury): electrolyte imbalance; fluid imbalance      Patient remained calm and cooperative throughout the night. Medication compliant. Woke up from her nap wide awake, after taking medications fell back into a deep sleep. Continue to monitor for changes in condition and safety.

## 2020-03-16 NOTE — PLAN OF CARE
Problem: Patient Care Overview  Goal: Plan of Care Review  Outcome: Ongoing (interventions implemented as appropriate)  Flowsheets (Taken 3/16/2020 1526)  Progress: no change  Plan of Care Reviewed With: patient  Note:   Pt alert and oriented this AM. PT recommending IP rehab. [facility] referral made. Vitals stable at this time. Will continue to monitor.

## 2020-03-16 NOTE — PLAN OF CARE
Problem: Patient Care Overview  Goal: Plan of Care Review  Outcome: Ongoing (interventions implemented as appropriate)  Flowsheets (Taken 3/16/2020 7368)  Outcome Summary: Pt is 81 y/o female admitted for AMS, lethargy, decreased appetite, and wekaness.  She was found to be in acute renal failure.  She is confused and severly weak.  Requires max-total assist for all ADLs.  She is not safe to return home and will require IP rehab at discharge.

## 2020-03-16 NOTE — PROGRESS NOTES
"      [facility] Medicine Services Daily Progress Note      Hospitalist Team  LOS 2 days      Patient Care Team:  Provider, No Known as PCP - General    Patient Location: 267/1      Subjective   Subjective     Chief Complaint / Subjective  Fatigue      Brief Synopsis of Hospital Course/HPI    The patient is an 80-year-old female with past medical history of CAD s/p CABG, COPD, anxiety, depression, hyperlipidemia, hypertension, and dementia that was brought from an outlying hospital after family took her there for altered mental status.  The patient has about 1 month history of worsening confusion, lethargy, poor appetite and malaise.       Date::    3/15/2020: No acute events.  Family unavailable for discussion at the time of my visit.  Patient has no new complaints.       Date::    3/16/20: Discussed case with daughter who is at the bedside.  Patient admits to depression.  Nephrology following    Review of Systems   All other systems reviewed and are negative.        Objective   Objective      Vital Signs  Temp:  [98 °F (36.7 °C)-98.8 °F (37.1 °C)] 98.6 °F (37 °C)  Heart Rate:  [] 76  Resp:  [12-32] 20  BP: ()/(49-70) 91/49  Oxygen Therapy  SpO2: 98 %  Pulse Oximetry Type: Continuous  Device (Oxygen Therapy): nasal cannula  Flow (L/min): 2  Oxygen Concentration (%): 28  Flowsheet Rows      First Filed Value   Admission Height  160 cm (63\") Documented at 03/14/2020 0500   Admission Weight  54.5 kg (120 lb 2.4 oz) Documented at 03/14/2020 0227        Intake & Output (last 3 days)       03/13 0701 - 03/14 0700 03/14 0701 - 03/15 0700 03/15 0701 - 03/16 0700 03/16 0701 - 03/17 0700    P.O.  250 580 120    I.V. (mL/kg) 1000 (18.3) 900 (17.1)      IV Piggyback 1000       Total Intake(mL/kg) 2000 (36.7) 1150 (21.9) 580 (11) 120 (2.3)    Urine (mL/kg/hr) 200 600 (0.5)      Stool  0      Total Output 200 600      Net +1800 +550 +580 +120            Urine Unmeasured Occurrence  1 x  1 x    " Stool Unmeasured Occurrence  3 x 4 x         Lines, Drains & Airways    Active LDAs     Name:   Placement date:   Placement time:   Site:   Days:    Peripheral IV 03/16/20 0150 Posterior;Right Wrist   03/16/20    0150    Wrist   less than 1                  Physical Exam:    Physical Exam   Constitutional: She is oriented to person, place, and time. No distress.   HENT:   Head: Normocephalic and atraumatic.   Eyes: Pupils are equal, round, and reactive to light. EOM are normal.   Neck: Normal range of motion. Neck supple.   Cardiovascular: Normal rate and normal heart sounds.   Pulmonary/Chest: Effort normal and breath sounds normal.   Abdominal: Soft. Bowel sounds are normal.   Musculoskeletal:   Decreased range of motion hips   Lymphadenopathy:     She has no cervical adenopathy.   Neurological: She is alert and oriented to person, place, and time.   Skin: Skin is warm and dry.   Psychiatric: She exhibits a depressed mood.             Procedures:              Results Review:     I reviewed the patient's new clinical results.      Lab Results (last 24 hours)     Procedure Component Value Units Date/Time    Urine Culture - Urine, Urine, Clean Catch [[id number]]  (Abnormal) Collected:  03/14/20 0620    Specimen:  Urine, Clean Catch Updated:  03/16/20 0919     Urine Culture <10,000 CFU/mL Escherichia coli     Comment: Call if further workup needed.         Phosphorus [[id number]]  (Normal) Collected:  03/16/20 0500    Specimen:  Blood Updated:  03/16/20 0611     Phosphorus 3.3 mg/dL      Comment: Result checked        BNP [[id number]]  (Abnormal) Collected:  03/16/20 0500    Specimen:  Blood Updated:  03/16/20 0609     proBNP 2,286.0 pg/mL     Narrative:       Among patients with dyspnea, NT-proBNP is highly sensitive for the detection of acute congestive heart failure. In addition NT-proBNP of <300 pg/ml effectively rules out acute congestive heart failure with 99% negative predictive value.    Results may be falsely  decreased if patient taking Biotin.      Basic Metabolic Panel [[id number]]  (Abnormal) Collected:  03/16/20 0500    Specimen:  Blood Updated:  03/16/20 0607     Glucose 101 mg/dL      BUN 52 mg/dL      Creatinine 1.72 mg/dL      Sodium 144 mmol/L      Potassium 4.1 mmol/L      Chloride 101 mmol/L      CO2 32.0 mmol/L      Calcium 8.7 mg/dL      eGFR Non African Amer 29 mL/min/1.73      BUN/Creatinine Ratio 30.2     Anion Gap 11.0 mmol/L     Narrative:       GFR Normal >60  Chronic Kidney Disease <60  Kidney Failure <15      CBC & Differential [[id number]] Collected:  03/16/20 0500    Specimen:  Blood Updated:  03/16/20 0541    Narrative:       The following orders were created for panel order CBC & Differential.  Procedure                               Abnormality         Status                     ---------                               -----------         ------                     CBC Auto Differential[[id number]]        Abnormal            Final result                 Please view results for these tests on the individual orders.    CBC Auto Differential [[id number]]  (Abnormal) Collected:  03/16/20 0500    Specimen:  Blood Updated:  03/16/20 0541     WBC 10.10 10*3/mm3      RBC 3.71 10*6/mm3      Hemoglobin 11.6 g/dL      Hematocrit 34.5 %      MCV 92.9 fL      MCH 31.4 pg      MCHC 33.8 g/dL      RDW 14.1 %      RDW-SD 46.8 fl      MPV 7.1 fL      Platelets 224 10*3/mm3      Neutrophil % 76.3 %      Lymphocyte % 14.2 %      Monocyte % 7.8 %      Eosinophil % 1.1 %      Basophil % 0.6 %      Neutrophils, Absolute 7.70 10*3/mm3      Lymphocytes, Absolute 1.40 10*3/mm3      Monocytes, Absolute 0.80 10*3/mm3      Eosinophils, Absolute 0.10 10*3/mm3      Basophils, Absolute 0.10 10*3/mm3      nRBC 0.1 /100 WBC     POC Glucose Once [[id number]]  (Abnormal) Collected:  03/15/20 1651    Specimen:  Blood Updated:  03/15/20 1723     Glucose 128 mg/dL      Comment: Serial Number: [id number]:  [id number]           No  results found for: HGBA1C            Lab Results   Component Value Date    LIPASE 5 (L) 03/14/2020     Lab Results   Component Value Date    CHOL 175 03/14/2020    TRIG 139 03/14/2020    HDL 53 03/14/2020    LDL 94 03/14/2020       No results found for: INTRAOP, PREDX, FINALDX, COMDX    Microbiology Results (last 10 days)     Procedure Component Value - Date/Time    Eosinophil Smear - Urine, Urine, Clean Catch [[id number]]  (Normal) Collected:  03/15/20 0251    Lab Status:  Final result Specimen:  Urine, Clean Catch Updated:  03/15/20 0426     Eosinophil Smear 0 % EOS/100 Cells     Urine Culture - Urine, Urine, Clean Catch [[id number]]  (Abnormal) Collected:  03/14/20 0620    Lab Status:  Final result Specimen:  Urine, Clean Catch Updated:  03/16/20 0919     Urine Culture <10,000 CFU/mL Escherichia coli     Comment: Call if further workup needed.               ECG/EMG Results (most recent)     Procedure Component Value Units Date/Time    Adult Transthoracic Echo Complete W/ Cont if Necessary Per Protocol [[id number]] Collected:  03/15/20 1155     Updated:  03/15/20 1716     BSA 1.5 m^2      RVIDd 2.7 cm      IVSd 0.84 cm      LVIDd 2.7 cm      LVIDs 2.0 cm      LVPWd 0.98 cm      IVS/LVPW 0.86     FS 25.5 %      EDV(Teich) 26.2 ml      ESV(Teich) 12.5 ml      EF(Teich) 52.3 %      EDV(cubed) 19.0 ml      ESV(cubed) 7.8 ml      EF(cubed) 58.7 %      LV mass(C)d 60.1 grams      LV mass(C)dI 39.3 grams/m^2      SV(Teich) 13.7 ml      SI(Teich) 9.0 ml/m^2      SV(cubed) 11.2 ml      SI(cubed) 7.3 ml/m^2      Ao root diam 2.6 cm      Ao root area 5.3 cm^2      ACS 1.8 cm      LVOT diam 1.7 cm      LVOT area 2.4 cm^2      EDV(MOD-sp4) 29.6 ml      ESV(MOD-sp4) 18.1 ml      EF(MOD-sp4) 39.0 %      SV(MOD-sp4) 11.5 ml      SI(MOD-sp4) 7.5 ml/m^2      Ao root area (BSA corrected) 1.7     LV Calderon Vol (BSA corrected) 19.4 ml/m^2      LV Sys Vol (BSA corrected) 11.8 ml/m^2      MV E max shaye 95.5 cm/sec      MV A max shaye 97.1  cm/sec      MV E/A 0.98     MV V2 max 108.9 cm/sec      MV max PG 4.7 mmHg      MV V2 mean 76.3 cm/sec      MV mean PG 2.5 mmHg      MV V2 VTI 26.4 cm      MVA(VTI) 2.6 cm^2      MV dec slope 344.7 cm/sec^2      MV dec time 0.28 sec      Ao pk shaye 150.8 cm/sec      Ao max PG 9.1 mmHg      Ao max PG (full) 3.7 mmHg      Ao V2 mean 121.5 cm/sec      Ao mean PG 6.3 mmHg      Ao mean PG (full) 2.8 mmHg      Ao V2 VTI 39.3 cm      ROXANE(I,A) 1.8 cm^2      ROXANE(I,D) 1.8 cm^2      ROXANE(V,A) 1.8 cm^2      ROXANE(V,D) 1.8 cm^2      LV V1 max PG 5.4 mmHg      LV V1 mean PG 3.5 mmHg      LV V1 max 116.3 cm/sec      LV V1 mean 90.7 cm/sec      LV V1 VTI 29.3 cm      SV(Ao) 207.4 ml      SI(Ao) 135.7 ml/m^2      SV(LVOT) 69.2 ml      SI(LVOT) 45.2 ml/m^2      PA V2 max 101.4 cm/sec      PA max PG 4.1 mmHg      PA max PG (full) 2.4 mmHg      RV V1 max PG 1.8 mmHg      RV V1 mean PG 0.98 mmHg      RV V1 max 66.2 cm/sec      RV V1 mean 46.1 cm/sec      RV V1 VTI 17.4 cm       CV ECHO ABDELRAHMAN - BZI_BMI 20.4 kilograms/m^2       CV ECHO ABDELRAHMAN - BSA(HAYCOCK) 1.5 m^2       CV ECHO ABDELRAHMAN - BZI_METRIC_WEIGHT 52.2 kg       CV ECHO ABDELRAHMAN - BZI_METRIC_HEIGHT 160.0 cm      EF(MOD-bp) 39.0 %      LA dimension(2D) 2.6 cm      Echo EF Estimated 60 %     Narrative:         · Estimated EF = 60%.  · Left ventricular systolic function is normal.     Indications  Hypertension    Technically satisfactory study.  Mitral valve is thickened with adequate opening motion.  Mitral annular   calcification is present.  Tricuspid valve is structurally normal.  Aortic valve is thickened with adequate opening motion.  Pulmonic valve could not be well visualized.  No evidence for mitral tricuspid or aortic regurgitation is seen by   Doppler study.  Left atrium is normal in size.  Right atrium is normal in size.  Left ventricle is normal in size and contractility with ejection fraction   of 60%.  Right ventricle is normal in size.  Atrial septum is intact.  Aorta is  normal.  No pericardial effusion or intracardiac thrombus is seen.    Impression  Thickened mitral and aortic valves with adequate opening motion.  Left ventricular size and contractility is normal with ejection fraction   of 60%  No pericardial effusion or intracardiac thrombus is seen.                   Results for orders placed during the hospital encounter of 03/14/20   Adult Transthoracic Echo Complete W/ Cont if Necessary Per Protocol    Narrative · Estimated EF = 60%.  · Left ventricular systolic function is normal.     Indications  Hypertension    Technically satisfactory study.  Mitral valve is thickened with adequate opening motion.  Mitral annular   calcification is present.  Tricuspid valve is structurally normal.  Aortic valve is thickened with adequate opening motion.  Pulmonic valve could not be well visualized.  No evidence for mitral tricuspid or aortic regurgitation is seen by   Doppler study.  Left atrium is normal in size.  Right atrium is normal in size.  Left ventricle is normal in size and contractility with ejection fraction   of 60%.  Right ventricle is normal in size.  Atrial septum is intact.  Aorta is normal.  No pericardial effusion or intracardiac thrombus is seen.    Impression  Thickened mitral and aortic valves with adequate opening motion.  Left ventricular size and contractility is normal with ejection fraction   of 60%  No pericardial effusion or intracardiac thrombus is seen.           Ct Head Without Contrast    Result Date: 3/14/2020  1. No acute intracranial abnormalities by CT examination. 2. Mild to moderate volume loss and chronic microvascular ischemic changes. Arteriosclerosis. 3. Remote infarcts within the cerebellum bilaterally. Electronically signed by:  Devante Irvin M.D.  3/14/2020 6:13 PM    Xr Chest 1 View    Result Date: 3/15/2020  No acute infiltrate is appreciated.  Electronically Signed By-Dr. Luis Rock MD On:3/15/2020 7:08 AM This report was finalized  on [id number] by Dr. Luis Rock MD.    Us Renal Bilateral    Result Date: 3/14/2020   1. The right and left kidneys show no evidence of stone or mass or obstruction. 2. No focal abnormality seen in the wall the moderately distended urinary bladder.  Electronically Signed By-DR. Jamie Sims MD On:3/14/2020 11:52 PM This report was finalized on [id number] by DR. Jamie Sims MD.          Xrays, labs reviewed personally by physician.    Medication Review:   I have reviewed the patient's current medication list      Scheduled Meds    clonazePAM 0.5 mg Oral TID   clopidogrel 75 mg Oral Daily   enoxaparin 30 mg Subcutaneous Daily   [START ON 3/17/2020] furosemide 10 mg Oral Daily   gabapentin 200 mg Oral TID   ipratropium-albuterol 3 mL Nebulization 4x Daily - RT   metoprolol succinate XL 50 mg Oral Daily   predniSONE 5 mg Oral Daily   sodium chloride 10 mL Intravenous Q12H   traZODone 50 mg Oral Nightly       Meds Infusions       Meds PRN  •  acetaminophen **OR** acetaminophen **OR** acetaminophen  •  ondansetron **OR** ondansetron  •  sodium chloride      Assessment/Plan   Assessment/Plan     Active Hospital Problems    Diagnosis  POA   • Acute renal failure (ARF) (CMS/HCC) [N17.9]  Yes   • Cardiomyopathy, ischemic [I25.5]  Yes   • Chronic obstructive pulmonary disease (CMS/HCC) [J44.9]  Yes   • Coronary artery disease [I25.10]  Yes   • Hypertension [I10]  Yes   • Benign hypertensive heart disease [I11.9]  Yes      Resolved Hospital Problems   No resolved problems to display.       MEDICAL DECISION MAKING COMPLEXITY BY PROBLEM:     Acute renal failure  - Improved with IVF  - Nephrology consulted     Altered mental status  - Suspect acute metabolic encephalopathy that is multifactorial  - s/p CT head without contrast 3/14/20--> CVA ruled out     Gram-negative bacteriuria  - Gram-negative bacilli in urine culture however colony count is less than 25,000 which could be colonization/contaminant,  or a partially treated urine culture  - Follow-up on urine culture result  - No signs or symptoms of sepsis; doubt active infection is present  - s/p Rocephin in ED; currently not on any antibiotic     CAD s/p CABG:  -Managed by primary care  -Has not seen a cardiologist since her original surgery done 20 years ago by Dr. Rankin  -TTE 3/14/20--> EF 60%  - Continue metoprolol and Plavix     Hypertension  - Continue metoprolol  --lisinopril held because of STEPHEN     COPD  -- without exacerbation  - Continue duo nebs, prednisone    SUKUMAR:  - continue home CPAP at night    Chronic hypoxemic respiratory failure:  - Continue supplemental oxygen at 2 L via nasal cannula as she has at home       Chronic pain  - Continue gabapentin    Anxiety depression:  --On Klonopin, trazodone at home    VTE Prophylaxis -   Mechanical Order History:     None      Pharmalogical Order History:     Ordered     Dose Route Frequency Stop    03/14/20 0240  enoxaparin (LOVENOX) syringe 30 mg      30 mg SC Daily --            Code Status -   Code Status and Medical Interventions:   Ordered at: 03/14/20 0241     Code Status:    CPR     Medical Interventions (Level of Support Prior to Arrest):    Full       Discharge Planning      SLP OP Goals     Row Name 03/15/20 1759          Time Calculation    PT Goal Re-Cert Due Date  03/29/20  -YUE       User Key  (r) = Recorded By, (t) = Taken By, (c) = Cosigned By    Initials Name Provider Type    Vashti Alegria, YONIS Physical Therapist          Destination      Service Provider Request Status Selected Services Address Phone Number Fax Number    [facility] Pending - [facility] N[facility]A [address] [facility] [place] [address] [phone]    [facility] Declined  incorrect referral N[facility]A [address] [facility] [place] [address] [phone]      Durable Medical Equipment      Coordination has not been started for this encounter.      Dialysis/Infusion       Coordination has not been started for this encounter.      Home Medical Care      Coordination has not been started for this encounter.      Therapy      Coordination has not been started for this encounter.      Community Resources      Coordination has not been started for this encounter.            Electronically signed by Mian Car DO, 03/16/20, 16:04.  [facility] Hospitalist Team

## 2020-03-16 NOTE — PLAN OF CARE
Pt seen for clinical swallow evaluation due to reports of difficulty chewing. Pt mainly edentulous with poor remaining dentition. Pt presents with poor ability to masticate solids and requests for ground meat. ST [facility]. Will follow for tolerance during her hospitalization and further goals as indicated.

## 2020-03-16 NOTE — DISCHARGE PLACEMENT REQUEST
"Gaby Perez (80 y.o. Female)     Date of Birth Social Security Number Address Home Phone MRN    1940  [address] [facility] [place] [address] [phone] [id number]    Jew Marital Status          None        Admission Date Admission Type Admitting Provider Attending Provider Department, Room/Bed    3/14/20 Urgent Mian Car, DO Mian Car,  [facility] NEURO HEART, 267/1    Discharge Date Discharge Disposition Discharge Destination                       Attending Provider:  Mian Car DO    Allergies:  Keflex [Cephalexin]    Isolation:  None   Infection:  None   Code Status:  CPR    Ht:  160 cm (63\")   Wt:  52.6 kg (116 lb)    Admission Cmt:  None   Principal Problem:  None                Active Insurance as of 3/14/2020     Primary Coverage     Payor Plan Insurance Group Employer/Plan Group    MEDICARE MEDICARE A & B      Payor Plan Address Payor Plan Phone Number Payor Plan Fax Number Effective Dates    [address] [phone]  2/1/2005 - None Entered    [facility] [address]       Subscriber Name Subscriber Birth Date Member ID       GABY PEREZ 1940 [id number]           Secondary Coverage     Payor Plan Insurance Group Employer/Plan Group    ANTHEM BLUE CROSS ANTHEM BLUE CROSS BLUE SHIELD PPO [id number]     Payor Plan Address Payor Plan Phone Number Payor Plan Fax Number Effective Dates    [address] [phone]  1/1/2019 - None Entered    [facility] [id number]       Subscriber Name Subscriber Birth Date Member ID       GABY PEREZ 1940 [id number]                 Emergency Contacts      (Rel.) Home Phone Work Phone Mobile Phone    ABEBA SANTORO (Other) -- -- [phone]    KENNY JI (Other) -- -- [phone]              "

## 2020-03-16 NOTE — DISCHARGE PLACEMENT REQUEST
"Gaby Perez (80 y.o. Female)     Date of Birth Social Security Number Address Home Phone MRN    1940  [address] [facility] [place] [address] [phone] [id number]    Evangelical Marital Status          None        Admission Date Admission Type Admitting Provider Attending Provider Department, Room/Bed    3/14/20 Urgent Mian Car, DO Mian Car,  [facility] NEURO HEART, 267/1    Discharge Date Discharge Disposition Discharge Destination                       Attending Provider:  Mian Car DO    Allergies:  Keflex [Cephalexin]    Isolation:  None   Infection:  None   Code Status:  CPR    Ht:  160 cm (63\")   Wt:  52.6 kg (116 lb)    Admission Cmt:  None   Principal Problem:  None                Active Insurance as of 3/14/2020     Primary Coverage     Payor Plan Insurance Group Employer/Plan Group    MEDICARE MEDICARE A & B      Payor Plan Address Payor Plan Phone Number Payor Plan Fax Number Effective Dates    [address] [phone]  2/1/2005 - None Entered    [facility] [place] [address]       Subscriber Name Subscriber Birth Date Member ID       GABY PEREZ 1940 [id number]           Secondary Coverage     Payor Plan Insurance Group Employer/Plan Group    ANTHEM BLUE CROSS ANTHEM BLUE CROSS BLUE SHIELD PPO [id number]     Payor Plan Address Payor Plan Phone Number Payor Plan Fax Number Effective Dates    [address] [phone]  1/1/2019 - None Entered    [facility] [address]       Subscriber Name Subscriber Birth Date Member ID       GABY PEREZ 1940 [id number]                 Emergency Contacts      (Rel.) Home Phone Work Phone Mobile Phone    ABEBA SANTORO (Other) -- -- [phone]    KENNY JI (Other) -- -- [phone]            "

## 2020-03-16 NOTE — PROGRESS NOTES
PROGRESS NOTE      Patient Name: Dipti Perez  : 1940  MRN: [id number]  Primary Care Physician: Provider, No Known  Date of admission: 3/14/2020    Patient Care Team:  Provider, No Known as PCP - General        Subjective   Subjective:   Patient is feeling much better than before no new more alert than before complaints systems:  Review   All other review of system unremarkable      Allergies:    Allergies   Allergen Reactions   • Keflex [Cephalexin] Anaphylaxis       Objective   Exam:     Vital Signs  Temp:  [98 °F (36.7 °C)-98.8 °F (37.1 °C)] 98.8 °F (37.1 °C)  Heart Rate:  [] 100  Resp:  [12-32] 32  BP: ()/(43-70) 127/70  SpO2:  [77 %-100 %] 99 %  on  Flow (L/min):  [2-3] 3;   Device (Oxygen Therapy): nasal cannula  Body mass index is 20.55 kg/m².    General: Elderly white  female in no acute distress.    Head:      Normocephalic and atraumatic.    Eyes:      PERRL/EOM intact, conjunctiva and sclera clear with out nystagmus.    Neck:      No masses, thyromegaly,  trachea central with normal respiratory effort   Lungs:    Clear bilaterally to auscultation.    Heart:      Regular rate and rhythm, no murmur no gallop  Abd:        BS +pedro, soft nontender,no shiftg dullness   Pulses:   Pulses palpable  Extr:        No cyanosis or clubbing--no significant edema.    Neuro:    No focal deficits.   alert oriented x3  Skin:       Intact without lesions or rashes.    Psych:    Alert and cooperative; normal mood and affect; .      Results Review:  I have personally reviewed most recent Data :  CBC    Results from last 7 days   Lab Units 20  0500 03/15/20  0443 20  0437   WBC 10*3/mm3 10.10 9.80 14.50*   HEMOGLOBIN g/dL 11.6* 11.0* 10.6*   PLATELETS 10*3/mm3 224 230 215     CMP   Results from last 7 days   Lab Units 20  0500 03/15/20  0342 20  1852 20  0253   SODIUM mmol/L 144 140 140 134*   POTASSIUM mmol/L 4.1 4.2 4.3 4.0   CHLORIDE mmol/L 101 98 95* 90*   CO2 mmol/L  32.0* 26.0 30.0* 29.0   BUN mg/dL 52* 61* 65* 66*   CREATININE mg/dL 1.72* 2.92* 3.83* 5.54*   GLUCOSE mg/dL 101* 97 164* 84   ALBUMIN g/dL  --  3.10*  --  3.40*   BILIRUBIN mg/dL  --  0.5  --  0.7   ALK PHOS U/L  --  81  --  100   AST (SGOT) U/L  --  37*  --  43*   ALT (SGPT) U/L  --  13  --  14   LIPASE U/L  --   --   --  5*     ABG      Imaging Results (Last 24 Hours)     Procedure Component Value Units Date/Time    XR Outside Films [[id number]] Resulted:  03/16/20 0644     Updated:  03/16/20 0644    Narrative:       This procedure was auto-finalized with no dictation required.          Results for orders placed during the hospital encounter of 03/14/20   Adult Transthoracic Echo Complete W/ Cont if Necessary Per Protocol    Narrative · Estimated EF = 60%.  · Left ventricular systolic function is normal.     Indications  Hypertension    Technically satisfactory study.  Mitral valve is thickened with adequate opening motion.  Mitral annular   calcification is present.  Tricuspid valve is structurally normal.  Aortic valve is thickened with adequate opening motion.  Pulmonic valve could not be well visualized.  No evidence for mitral tricuspid or aortic regurgitation is seen by   Doppler study.  Left atrium is normal in size.  Right atrium is normal in size.  Left ventricle is normal in size and contractility with ejection fraction   of 60%.  Right ventricle is normal in size.  Atrial septum is intact.  Aorta is normal.  No pericardial effusion or intracardiac thrombus is seen.    Impression  Thickened mitral and aortic valves with adequate opening motion.  Left ventricular size and contractility is normal with ejection fraction   of 60%  No pericardial effusion or intracardiac thrombus is seen.         Scheduled Meds:    clonazePAM 0.5 mg Oral TID   clopidogrel 75 mg Oral Daily   enoxaparin 30 mg Subcutaneous Daily   furosemide 20 mg Oral Daily   gabapentin 200 mg Oral TID   ipratropium-albuterol 3 mL Nebulization  4x Daily - RT   metoprolol succinate XL 50 mg Oral Daily   predniSONE 5 mg Oral Daily   sodium chloride 10 mL Intravenous Q12H   traZODone 50 mg Oral Nightly     Continuous Infusions:   PRN Meds:•  acetaminophen **OR** acetaminophen **OR** acetaminophen  •  ondansetron **OR** ondansetron  •  sodium chloride    Assessment/Plan   Assessment and Plan:         Acute renal failure (ARF) (CMS/[facility])    Benign hypertensive heart disease    Cardiomyopathy, ischemic    Chronic obstructive pulmonary disease (CMS/[facility])    Coronary artery disease    Hypertension    ASSESSMENT:  · Acute kidney injury: Patient BNP level is high echocardiogram is still pending but with bicarb level of 29 normal electrolytes patient acute kidney injury seems to be related to volume depletion but BNP level is still elevated.  · Congestive heart failure ejection fraction unknown repeat echocardiogram pending  · Acute coronary syndrome  · History of some dementia  · History of hypertension        Plan:      · Urine studies are sent with some CKD with proteinuria from 500 mg  · Electrolytes are acceptable  · Follow-up with echocardiogram  · At this time creatinine continue to improve with improvement in the urine output  · Echocardiogram result appreciated  · Etiology of acute increase in creatinine seem to be multifactorial including low blood pressure, decreased p.o. intake some volume depletion in the presence of ACE inhibitor's.  No evidence of nonsteroidal anti-inflammatory drugs or any contrast nephropathy at this time.  But renal functions are slowly improving for now  · Hemodynamically stable I will start some low-dose diuretics as BNP level is increasing   · Chest x-ray mainly consistent with COPD instead of volume overload that might be causing some increased shortness of breath    · Electrolytes and acid-base is acceptable not consistent with any volume overload  · Will decrease to Lasix 10 mg a day as to me volume status is stable and patient  echocardiogram is normal            Note started  by Zoran Do MD, 03/15/20, 10:17 AM.  [facility] kidney consultant

## 2020-03-17 ENCOUNTER — APPOINTMENT (OUTPATIENT)
Dept: GENERAL RADIOLOGY | Facility: HOSPITAL | Age: 80
End: 2020-03-17

## 2020-03-17 LAB
ANION GAP SERPL CALCULATED.3IONS-SCNC: 11 MMOL/L (ref 5–15)
BASOPHILS # BLD AUTO: 0 10*3/MM3 (ref 0–0.2)
BASOPHILS NFR BLD AUTO: 0.2 % (ref 0–1.5)
BUN BLD-MCNC: 41 MG/DL (ref 8–23)
BUN/CREAT SERPL: 39 (ref 7–25)
CALCIUM SPEC-SCNC: 9 MG/DL (ref 8.6–10.5)
CHLORIDE SERPL-SCNC: 99 MMOL/L (ref 98–107)
CO2 SERPL-SCNC: 36 MMOL/L (ref 22–29)
CREAT BLD-MCNC: 1.05 MG/DL (ref 0.57–1)
CRP SERPL-MCNC: 7.2 MG/DL (ref 0–0.5)
DEPRECATED RDW RBC AUTO: 43.8 FL (ref 37–54)
EOSINOPHIL # BLD AUTO: 0 10*3/MM3 (ref 0–0.4)
EOSINOPHIL NFR BLD AUTO: 0.6 % (ref 0.3–6.2)
ERYTHROCYTE [DISTWIDTH] IN BLOOD BY AUTOMATED COUNT: 13.5 % (ref 12.3–15.4)
ERYTHROCYTE [SEDIMENTATION RATE] IN BLOOD: 48 MM/HR (ref 0–30)
GFR SERPL CREATININE-BSD FRML MDRD: 50 ML/MIN/1.73
GLUCOSE BLD-MCNC: 102 MG/DL (ref 65–99)
HCT VFR BLD AUTO: 31.4 % (ref 34–46.6)
HGB BLD-MCNC: 10.5 G/DL (ref 12–15.9)
LYMPHOCYTES # BLD AUTO: 1.4 10*3/MM3 (ref 0.7–3.1)
LYMPHOCYTES NFR BLD AUTO: 17.8 % (ref 19.6–45.3)
MCH RBC QN AUTO: 31.1 PG (ref 26.6–33)
MCHC RBC AUTO-ENTMCNC: 33.4 G/DL (ref 31.5–35.7)
MCV RBC AUTO: 93.1 FL (ref 79–97)
MONOCYTES # BLD AUTO: 0.7 10*3/MM3 (ref 0.1–0.9)
MONOCYTES NFR BLD AUTO: 9.2 % (ref 5–12)
NEUTROPHILS # BLD AUTO: 5.6 10*3/MM3 (ref 1.7–7)
NEUTROPHILS NFR BLD AUTO: 72.2 % (ref 42.7–76)
NRBC BLD AUTO-RTO: 0 /100 WBC (ref 0–0.2)
NT-PROBNP SERPL-MCNC: 1464 PG/ML (ref 5–1800)
PHOSPHATE SERPL-MCNC: 2.4 MG/DL (ref 2.5–4.5)
PLATELET # BLD AUTO: 271 10*3/MM3 (ref 140–450)
PMV BLD AUTO: 6.9 FL (ref 6–12)
POTASSIUM BLD-SCNC: 3.7 MMOL/L (ref 3.5–5.2)
PROCALCITONIN SERPL-MCNC: 0.14 NG/ML (ref 0.1–0.25)
RBC # BLD AUTO: 3.37 10*6/MM3 (ref 3.77–5.28)
SODIUM BLD-SCNC: 146 MMOL/L (ref 136–145)
VIT B12 BLD-MCNC: 377 PG/ML (ref 211–946)
WBC NRBC COR # BLD: 7.7 10*3/MM3 (ref 3.4–10.8)

## 2020-03-17 PROCEDURE — 85652 RBC SED RATE AUTOMATED: CPT | Performed by: HOSPITALIST

## 2020-03-17 PROCEDURE — 99232 SBSQ HOSP IP/OBS MODERATE 35: CPT | Performed by: HOSPITALIST

## 2020-03-17 PROCEDURE — 25010000002 ENOXAPARIN PER 10 MG: Performed by: NURSE PRACTITIONER

## 2020-03-17 PROCEDURE — 80048 BASIC METABOLIC PNL TOTAL CA: CPT | Performed by: INTERNAL MEDICINE

## 2020-03-17 PROCEDURE — 71045 X-RAY EXAM CHEST 1 VIEW: CPT

## 2020-03-17 PROCEDURE — 84100 ASSAY OF PHOSPHORUS: CPT | Performed by: INTERNAL MEDICINE

## 2020-03-17 PROCEDURE — 94799 UNLISTED PULMONARY SVC/PX: CPT

## 2020-03-17 PROCEDURE — 99221 1ST HOSP IP/OBS SF/LOW 40: CPT | Performed by: INTERNAL MEDICINE

## 2020-03-17 PROCEDURE — 82607 VITAMIN B-12: CPT | Performed by: HOSPITALIST

## 2020-03-17 PROCEDURE — 83880 ASSAY OF NATRIURETIC PEPTIDE: CPT | Performed by: INTERNAL MEDICINE

## 2020-03-17 PROCEDURE — 84145 PROCALCITONIN (PCT): CPT | Performed by: HOSPITALIST

## 2020-03-17 PROCEDURE — 86140 C-REACTIVE PROTEIN: CPT | Performed by: HOSPITALIST

## 2020-03-17 PROCEDURE — 93005 ELECTROCARDIOGRAM TRACING: CPT | Performed by: HOSPITALIST

## 2020-03-17 PROCEDURE — 63710000001 PREDNISONE PER 5 MG: Performed by: NURSE PRACTITIONER

## 2020-03-17 PROCEDURE — 93005 ELECTROCARDIOGRAM TRACING: CPT | Performed by: INTERNAL MEDICINE

## 2020-03-17 PROCEDURE — 92526 ORAL FUNCTION THERAPY: CPT

## 2020-03-17 PROCEDURE — 85025 COMPLETE CBC W/AUTO DIFF WBC: CPT | Performed by: INTERNAL MEDICINE

## 2020-03-17 RX ORDER — GABAPENTIN 100 MG/1
100 CAPSULE ORAL 3 TIMES DAILY
Status: DISCONTINUED | OUTPATIENT
Start: 2020-03-17 | End: 2020-03-18 | Stop reason: HOSPADM

## 2020-03-17 RX ORDER — CLONAZEPAM 0.5 MG/1
0.5 TABLET ORAL DAILY
Status: DISCONTINUED | OUTPATIENT
Start: 2020-03-18 | End: 2020-03-18 | Stop reason: HOSPADM

## 2020-03-17 RX ADMIN — IPRATROPIUM BROMIDE AND ALBUTEROL SULFATE 3 ML: .5; 3 SOLUTION RESPIRATORY (INHALATION) at 07:07

## 2020-03-17 RX ADMIN — IPRATROPIUM BROMIDE AND ALBUTEROL SULFATE 3 ML: .5; 3 SOLUTION RESPIRATORY (INHALATION) at 19:08

## 2020-03-17 RX ADMIN — IPRATROPIUM BROMIDE AND ALBUTEROL SULFATE 3 ML: .5; 3 SOLUTION RESPIRATORY (INHALATION) at 15:04

## 2020-03-17 RX ADMIN — CLONAZEPAM 0.5 MG: 0.5 TABLET ORAL at 02:03

## 2020-03-17 RX ADMIN — GABAPENTIN 100 MG: 100 CAPSULE ORAL at 21:55

## 2020-03-17 RX ADMIN — PREDNISONE 5 MG: 5 TABLET ORAL at 11:31

## 2020-03-17 RX ADMIN — METOPROLOL SUCCINATE 50 MG: 50 TABLET, EXTENDED RELEASE ORAL at 11:31

## 2020-03-17 RX ADMIN — GABAPENTIN 200 MG: 100 CAPSULE ORAL at 02:04

## 2020-03-17 RX ADMIN — GABAPENTIN 100 MG: 100 CAPSULE ORAL at 18:13

## 2020-03-17 RX ADMIN — TRAZODONE HYDROCHLORIDE 50 MG: 50 TABLET ORAL at 02:04

## 2020-03-17 RX ADMIN — TRAZODONE HYDROCHLORIDE 50 MG: 50 TABLET ORAL at 21:55

## 2020-03-17 RX ADMIN — IPRATROPIUM BROMIDE AND ALBUTEROL SULFATE 3 ML: .5; 3 SOLUTION RESPIRATORY (INHALATION) at 10:55

## 2020-03-17 RX ADMIN — GABAPENTIN 100 MG: 100 CAPSULE ORAL at 11:31

## 2020-03-17 RX ADMIN — CLOPIDOGREL BISULFATE 75 MG: 75 TABLET ORAL at 11:31

## 2020-03-17 RX ADMIN — Medication 10 ML: at 21:55

## 2020-03-17 RX ADMIN — ENOXAPARIN SODIUM 30 MG: 30 INJECTION SUBCUTANEOUS at 18:13

## 2020-03-17 NOTE — PROGRESS NOTES
PROGRESS NOTE      Patient Name: Dipti Perez  : 1940  MRN: [id number]  Primary Care Physician: Provider, No Known  Date of admission: 3/14/2020    Patient Care Team:  Provider, No Known as PCP - General        Subjective   Subjective:   Patient is feeling much better than before no new more alert than before complaints systems:  Review   All other review of system unremarkable      Allergies:    Allergies   Allergen Reactions   • Keflex [Cephalexin] Anaphylaxis       Objective   Exam:     Vital Signs  Temp:  [98 °F (36.7 °C)-98.6 °F (37 °C)] 98 °F (36.7 °C)  Heart Rate:  [74-94] 85  Resp:  [17-26] 20  BP: ()/(49-69) 122/55  SpO2:  [98 %-100 %] 100 %  on  Flow (L/min):  [2-3] 2.5;   Device (Oxygen Therapy): nasal cannula  Body mass index is 19.57 kg/m².    General: Elderly white  female in no acute distress.    Head:      Normocephalic and atraumatic.    Eyes:      PERRL/EOM intact, conjunctiva and sclera clear with out nystagmus.    Neck:      No masses, thyromegaly,  trachea central with normal respiratory effort   Lungs:    Clear bilaterally to auscultation.    Heart:      Regular rate and rhythm, no murmur no gallop  Abd:        BS +pedro, soft nontender,no shiftg dullness   Pulses:   Pulses palpable  Extr:        No cyanosis or clubbing--no significant edema.    Neuro:    No focal deficits.   alert oriented x3  Skin:       Intact without lesions or rashes.    Psych:    Alert and cooperative; normal mood and affect; .      Results Review:  I have personally reviewed most recent Data :  CBC    Results from last 7 days   Lab Units 20  0300 20  0500 03/15/20  0443 20  0437   WBC 10*3/mm3 7.70 10.10 9.80 14.50*   HEMOGLOBIN g/dL 10.5* 11.6* 11.0* 10.6*   PLATELETS 10*3/mm3 271 224 230 215     CMP   Results from last 7 days   Lab Units 20  0300 20  0500 03/15/20  0342 20  1852 20  0253   SODIUM mmol/L 146* 144 140 140 134*   POTASSIUM mmol/L 3.7 4.1 4.2  4.3 4.0   CHLORIDE mmol/L 99 101 98 95* 90*   CO2 mmol/L 36.0* 32.0* 26.0 30.0* 29.0   BUN mg/dL 41* 52* 61* 65* 66*   CREATININE mg/dL 1.05* 1.72* 2.92* 3.83* 5.54*   GLUCOSE mg/dL 102* 101* 97 164* 84   ALBUMIN g/dL  --   --  3.10*  --  3.40*   BILIRUBIN mg/dL  --   --  0.5  --  0.7   ALK PHOS U/L  --   --  81  --  100   AST (SGOT) U/L  --   --  37*  --  43*   ALT (SGPT) U/L  --   --  13  --  14   LIPASE U/L  --   --   --   --  5*     ABG      Imaging Results (Last 24 Hours)     Procedure Component Value Units Date/Time    XR Chest 1 View [[id number]] Collected:  03/17/20 0818     Updated:  03/17/20 0821    Narrative:       DATE OF EXAM:  3/17/2020 8:12 AM     PROCEDURE:  XR CHEST 1 VW-     INDICATIONS:  Fatigue, COPD, cough, coronary artery disease, hypertension.      COMPARISON:  3/14/2020.     TECHNIQUE:   Single radiographic view of the chest was obtained.     FINDINGS:  The heart size is normal. The patient's had a previous median  sternotomy. The patient also has underlying emphysema. The pulmonary  vascular markings are normal. There are new areas of patchy  consolidation in the right midlung zone and left lung base suspicious  for pneumonia. There is no pleural effusion or pneumothorax.  There are  chronic age-related changes involving the bony thorax and thoracic  aorta.       Impression:          1. Emphysema.  2. New consolidation in the right midlung zone and left lung base  suspicious for pneumonia.     Electronically Signed By-Harman Zimmer On:3/17/2020 8:19 AM  This report was finalized on [id number] by  Harman Zimmer, .          Results for orders placed during the hospital encounter of 03/14/20   Adult Transthoracic Echo Complete W/ Cont if Necessary Per Protocol    Narrative · Estimated EF = 60%.  · Left ventricular systolic function is normal.     Indications  Hypertension    Technically satisfactory study.  Mitral valve is thickened with adequate opening motion.  Mitral annular   calcification is  present.  Tricuspid valve is structurally normal.  Aortic valve is thickened with adequate opening motion.  Pulmonic valve could not be well visualized.  No evidence for mitral tricuspid or aortic regurgitation is seen by   Doppler study.  Left atrium is normal in size.  Right atrium is normal in size.  Left ventricle is normal in size and contractility with ejection fraction   of 60%.  Right ventricle is normal in size.  Atrial septum is intact.  Aorta is normal.  No pericardial effusion or intracardiac thrombus is seen.    Impression  Thickened mitral and aortic valves with adequate opening motion.  Left ventricular size and contractility is normal with ejection fraction   of 60%  No pericardial effusion or intracardiac thrombus is seen.         Scheduled Meds:    [START ON 3/18/2020] clonazePAM 0.5 mg Oral Daily   clopidogrel 75 mg Oral Daily   enoxaparin 30 mg Subcutaneous Daily   gabapentin 100 mg Oral TID   ipratropium-albuterol 3 mL Nebulization 4x Daily - RT   metoprolol succinate XL 50 mg Oral Daily   predniSONE 5 mg Oral Daily   sodium chloride 10 mL Intravenous Q12H   traZODone 50 mg Oral Nightly     Continuous Infusions:   PRN Meds:•  acetaminophen **OR** acetaminophen **OR** acetaminophen  •  ondansetron **OR** ondansetron  •  sodium chloride    Assessment/Plan   Assessment and Plan:         Acute renal failure (ARF) (CMS/HCC)    Benign hypertensive heart disease    Cardiomyopathy, ischemic    Chronic obstructive pulmonary disease (CMS/HCC)    Coronary artery disease    Hypertension    ASSESSMENT:  · Acute kidney injury: Patient BNP level is high echocardiogram is still pending but with bicarb level of 29 normal electrolytes patient acute kidney injury seems to be related to volume depletion but BNP level is still elevated.  · Congestive heart failure ejection fraction unknown repeat echocardiogram pending  · Acute coronary syndrome  · History of some dementia  · History of hypertension        Plan:       · Renal functions have improved significantly  · Stop Lasix  · Cardiogram result appreciated which is normal  · No need for extra diuretics  · Follow-up with cardiology  · At this time creatinine continue to improve with improvement in the urine output  · Etiology of acute increase in creatinine seem to be multifactorial including low blood pressure, decreased p.o. intake some volume depletion in the presence of ACE inhibitor's.  No evidence of nonsteroidal anti-inflammatory drugs or any contrast nephropathy at this time.  But renal functions are slowly improving for now  · Chest x-ray mainly consistent with COPD instead of volume overload that might be causing some increased shortness of breath    · Electrolytes and acid-base is acceptable not consistent with any volume overload              Note started  by Zoran Do MD, 03/15/20, 10:17 AM.  [facility] kidney consultant

## 2020-03-17 NOTE — PLAN OF CARE
Problem: Patient Care Overview  Goal: Plan of Care Review  Outcome: Ongoing (interventions implemented as appropriate)  Flowsheets  Taken 3/17/2020 1351  Progress: no change  Taken 3/17/2020 4736  Plan of Care Reviewed With: patient  Note:   Pt went in to atrial fibrillation this afternoon, EKG obtained. Pt reports no pain at this time. Plan of care discussed. Will continue to monitor.

## 2020-03-17 NOTE — PLAN OF CARE
Problem: Patient Care Overview  Goal: Plan of Care Review  Outcome: Ongoing (interventions implemented as appropriate)  Flowsheets (Taken 3/17/2020 0407)  Plan of Care Reviewed With: patient  Note:   Lethargic,but awoken enough o take meds, anxious to get back to her home. Assisted x2 to [facility].  Goal: Individualization and Mutuality  Outcome: Ongoing (interventions implemented as appropriate)  Flowsheets (Taken 3/17/2020 0407)  Patient Specific Interventions: bed alarm, tele, labs, meds  Goal: Discharge Needs Assessment  Outcome: Ongoing (interventions implemented as appropriate)  Goal: Interprofessional Rounds/Family Conf  Outcome: Ongoing (interventions implemented as appropriate)     Problem: Fall Risk (Adult)  Goal: Absence of Fall  Outcome: Ongoing (interventions implemented as appropriate)     Problem: Pain, Chronic (Adult)  Goal: Acceptable Pain/Comfort Level and Functional Ability  Outcome: Ongoing (interventions implemented as appropriate)     Problem: Skin Injury Risk (Adult)  Goal: Skin Health and Integrity  Outcome: Ongoing (interventions implemented as appropriate)     Problem: Confusion, Acute (Adult)  Goal: Cognitive/Functional Impairments Minimized  Outcome: Ongoing (interventions implemented as appropriate)  Goal: Safety  Outcome: Ongoing (interventions implemented as appropriate)     Problem: Renal Failure/Kidney Injury, Acute (Adult)  Goal: Signs and Symptoms of Listed Potential Problems Will be Absent, Minimized or Managed (Renal Failure/Kidney Injury, Acute)  Outcome: Ongoing (interventions implemented as appropriate)

## 2020-03-17 NOTE — CONSULTS
Nutrition Services    Patient Name:  Dipti Perez  YOB: 1940  MRN: [id number]  Admit Date:  3/14/2020    Comments: Mechanical Soft diet per SLP evaluation. RD to add Boost Plus BID for an added 720 kcal and 28g protein.    Reason for Assessment                Reason for Assessment    Reason For Assessment 3/17/20: BIN consult       Diagnosis H&P:   80-year-old female with past medical history of coronary artery disease, CHF, cardiomyopathy, CABG, COPD, hyperlipidemia, hypertension, and dementia who was brought from an outlying hospital after family took her there for altered mental status.  Patient is of poor historian and can give very little details.  Family at the bedside state that she had been declining gradually over the past month with increased confusion, lethargy, poor appetite, and malaise.    Current Problems:   Acute renal failure  -volume overload noted on admission    AMS  -metabolic encephalopathy    Leukocytosis    CAD/CHF/CABG         Nutrition/Diet History                Nutrition/Diet History    Narrative     Pt admitted with AMS- noted hasn't been eating well PTA, appears about 1 month. PIERRE pt this date, s/w RN who states pt is not wanting to wake today and no family is present in room. Nursing reports poor po intake today due to inability to stay awake.     Functional Status Per OT note, pt requires max assist for all ADL's, extremely weak. Needs IP rehab.        Food Allergies NKFA     Factors Affecting Nutritional Intake Lethargy, AMS         Anthropometrics             Anthropometrics    Height 160cm, 63in    Weight 50.1kg, 110lb       Admit Weight    Admit Weight     3/14/2020   Weight 54.5 kg (120 lb 2.4 oz)   Volume overload documented by renal with improvement after diuretics       Ideal Body Weight (IBW)    Ideal Body Weight (IBW) 115lb    % Ideal Body Weight 96%       Usual Body Weight (UBW)    Usual Body Weight PIERRE    % Usual Body Weight PIERRE    Weight Hx  No weight  history PTA       Body Mass Index (BMI)    BMI (kg/m2) 19.57 kg/m²           Labs/Medications                Labs    Pertinent Lab Results Comments Na 146 elev, BUN 41 elev, Creat 1.05 elev        Medications    Pertinent Medications Comments Neurontin, Prednisone         Physical Findings                Physical Findings    Overall Physical Appearance PIERRE pt this date.     Edema  None per EMR     Gastrointestinal +BM 3/17     Tubes none     Oral/Mouth Cavity SLP following, altered diet in place     Skin No breakdown noted         Estimated/Assessed Needs              Calorie Requirements     Height Used for Calorie Calculations       Weight Used for Calorie Calculations      Formula chosen for Calorie Calculations       Estimated Calorie Requirements (kcals/day)              Protein Requirements    Weight Used For Protein Calculations       Est Protein Requirement Amount (gms/kg)       Estimated Protein Requirements (gms/day)          Fluid Requirements     Estimated Fluid Requirements (mL/day)            Fluid Deficit       Desired Sodium Level (mEq/L)      Desired Sodium Level (mEq/L)      Estimated Fluid Deficit Needs (L)           Nutrition Prescription Ordered                Nutrition Prescription PO    Current PO Diet  Mechanical Soft     Supplement         Nutrition Prescription EN    Enteral Route -     TF modular -     TF Delivery Method -     Current Ordered TF  -     Current Ordered Water flush  -     TF Observation  -        Nutrition Prescription TPN    TPN Route -     Current Ordered TPN Volume  -     Dextrose (gm/kcals)  -     Amino Acid (gm/kcals) -     Lipids (ML/Concentration/Frequency)  -     TPN Observation  -          Evaluation of Received Nutrient/Fluid Intake                PO Evaluation    % PO Intake 53% x8 meals        EN Evaluation    TF Changes -     TF Residual -     TF Tolerance      Average EN Delivered  -        TPN Evaluation    Total Number of Days on TPN  -           Clinical  Course      Clinical Nutrition Course Details  3/16 Mechanical Soft  3/14 Soft to Chew         Problem/Interventions:                     Nutrition Diagnoses Problem 1      Problem 1   Inadequate oral intake related to AMS, lethargy, as evidenced by po intake 53% of meals and pt refusing to wake for some meals.     Nutrition Diagnoses Problem 2      Problem 2            Intervention Goal                Intervention Goal    General Meal intake 65% of meals.    Consumes one ONS daily.         Nutrition Intervention                Nutrition Intervention    RD/Tech Action Add Boost Plus BID         Nutrition Prescription                Nutrition Prescription PO      Diet  Mechanical Soft     Supplement Boost Plus BID        Nutrition Prescription EN    Enteral Prescription -        Nutrition Prescription TPN    TPN Prescription -         Monitor/Evaluation                Monitor/Evaluation    Monitor Intake, weight, labs, BM             Electronically signed by:  Veronika Gupta RD  03/17/20 14:01

## 2020-03-17 NOTE — CONSULTS
Referring Provider: Hospitalist  Reason for Consultation: Shortness of breath    Patient Care Team:  Provider, No Known as PCP - General    Chief complaint shortness of breath    Subjective .     History of present illness:  Dipti Perez is a 80 y.o. female with history of coronary disease status post carotid bypass surgery history of ischemic cardiomyopathy atrial fibrillation hypertension Chronic insufficiency dementia hyperlipidemia presented to the hospital complains of shortness of breath and some mental status changes.  Patient does not answer questions appropriately.  She was not having any symptoms of chest pain.  No complains any PND orthopnea.  No dizziness syncope or swelling of the feet.  She was taking her medicines regularly.  She does not smoke.    Review of Systems   Unable to perform ROS: mental status change       History  Past Medical History:   Diagnosis Date   • CAD (coronary artery disease)    • CHF (congestive heart failure) (CMS/[facility])    • COPD (chronic obstructive pulmonary disease) (CMS/[facility])    • Dementia (CMS/[facility])    • Elevated cholesterol    • Hyperlipidemia    • Hypertension        Past Surgical History:   Procedure Laterality Date   • CARDIAC SURGERY     • CORONARY ARTERY BYPASS GRAFT     • SPINE SURGERY         Family History   Family history unknown: Yes       Social History     Tobacco Use   • Smoking status: Former Smoker     Packs/day: 1.00     Years: 10.00     Pack years: 10.00   • Smokeless tobacco: Never Used   Substance Use Topics   • Alcohol use: Never     Frequency: Never   • Drug use: Never        Medications Prior to Admission   Medication Sig Dispense Refill Last Dose   • cholecalciferol (VITAMIN D3) 25 MCG (1000 UT) tablet Take 2,000 Units by mouth Daily.   3/13/2020 at Unknown time   • clonazePAM (KlonoPIN) 0.5 MG tablet Take 0.5 mg by mouth 3 (Three) Times a Day.   Past Week at Unknown time   • clopidogrel (PLAVIX) 75 MG tablet Take 75 mg by mouth Daily.    3/13/2020 at Unknown time   • gabapentin (NEURONTIN) 300 MG capsule Take 300 mg by mouth 3 (Three) Times a Day.   3/13/2020 at Unknown time   • ipratropium-albuterol (DUO-NEB) 0.5-2.5 mg/3 ml nebulizer Take 3 mL by nebulization 4 (Four) Times a Day.   3/13/2020 at Unknown time   • lisinopril (PRINIVIL,ZESTRIL) 5 MG tablet Take 5 mg by mouth Daily.   3/13/2020 at Unknown time   • metoprolol succinate XL (TOPROL-XL) 50 MG 24 hr tablet Take 50 mg by mouth Daily.   3/13/2020 at Unknown time   • polyethylene glycol (MIRALAX) packet Take 17 g by mouth 2 (Two) Times a Day.   3/13/2020 at Unknown time   • predniSONE (DELTASONE) 5 MG tablet Take 5 mg by mouth Daily.   3/13/2020 at Unknown time   • traMADol (ULTRAM) 50 MG tablet Take 50 mg by mouth 3 (Three) Times a Day As Needed for Moderate Pain  ( MG TID PRN).   Past Week at Unknown time   • traZODone (DESYREL) 50 MG tablet Take 50 mg by mouth Every Night.   Past Week at Unknown time         Keflex [cephalexin]    Scheduled Meds:    [START ON 3/18/2020] clonazePAM 0.5 mg Oral Daily   clopidogrel 75 mg Oral Daily   enoxaparin 30 mg Subcutaneous Daily   furosemide 10 mg Oral Daily   gabapentin 100 mg Oral TID   ipratropium-albuterol 3 mL Nebulization 4x Daily - RT   metoprolol succinate XL 50 mg Oral Daily   predniSONE 5 mg Oral Daily   sodium chloride 10 mL Intravenous Q12H   traZODone 50 mg Oral Nightly     Continuous Infusions:   PRN Meds:.•  acetaminophen **OR** acetaminophen **OR** acetaminophen  •  ondansetron **OR** ondansetron  •  sodium chloride    Objective     VITAL SIGNS  Vitals:    03/17/20 0222 03/17/20 0613 03/17/20 0707 03/17/20 0711   BP: 136/51 122/55     BP Location: Right arm Right arm     Patient Position: Lying Lying     Pulse: 94 78 85    Resp: 17 17 17 20   Temp: 98.2 °F (36.8 °C) 98 °F (36.7 °C)     TempSrc: Oral Oral     SpO2: 100% 99% 100% 100%   Weight:  50.1 kg (110 lb 7.2 oz)     Height:           Flowsheet Rows      First Filed Value    "  Admission Height  160 cm (63\") Documented at 03/14/2020 0500   Admission Weight  54.5 kg (120 lb 2.4 oz) Documented at 03/14/2020 0227           TELEMETRY: Sinus rhythm with nonspecific ST segment abnormality    Physical Exam:  Physical Exam   Constitutional: She appears well-developed and well-nourished.   HENT:   Head: Normocephalic and atraumatic.   Eyes: Pupils are equal, round, and reactive to light. Conjunctivae and EOM are normal. No scleral icterus.   Neck: Normal range of motion. Neck supple. No JVD present. Carotid bruit is not present.   Cardiovascular: Normal rate, regular rhythm, S1 normal, S2 normal, normal heart sounds and intact distal pulses. PMI is not displaced.   Pulmonary/Chest: Effort normal and breath sounds normal. She has no wheezes. She has no rales.   Abdominal: Soft. Bowel sounds are normal.   Musculoskeletal: Normal range of motion.   Neurological: She is alert. She has normal strength.   No focal deficits   Skin: Skin is warm and dry. No rash noted.   Psychiatric: She has a normal mood and affect.        Results Review:   I reviewed the patient's new clinical results.  Lab Results (last 24 hours)     Procedure Component Value Units Date/Time    Sedimentation Rate [[id number]]  (Abnormal) Collected:  03/17/20 0300    Specimen:  Blood Updated:  03/17/20 0507     Sed Rate 48 mm/hr     Phosphorus [[id number]]  (Abnormal) Collected:  03/17/20 0300    Specimen:  Blood Updated:  03/17/20 0450     Phosphorus 2.4 mg/dL     Basic Metabolic Panel [[id number]]  (Abnormal) Collected:  03/17/20 0300    Specimen:  Blood Updated:  03/17/20 0441     Glucose 102 mg/dL      BUN 41 mg/dL      Creatinine 1.05 mg/dL      Sodium 146 mmol/L      Potassium 3.7 mmol/L      Chloride 99 mmol/L      CO2 36.0 mmol/L      Calcium 9.0 mg/dL      eGFR Non African Amer 50 mL/min/1.73      BUN/Creatinine Ratio 39.0     Anion Gap 11.0 mmol/L     Narrative:       GFR Normal >60  Chronic Kidney Disease <60  Kidney Failure " "<15      C-reactive Protein [[id number]]  (Abnormal) Collected:  03/17/20 0300    Specimen:  Blood Updated:  03/17/20 0441     C-Reactive Protein 7.20 mg/dL     Procalcitonin [[id number]]  (Normal) Collected:  03/17/20 0300    Specimen:  Blood Updated:  03/17/20 0438     Procalcitonin 0.14 ng/mL     Narrative:       As a Marker for Sepsis (Non-Neonates):   1. <0.5 ng/mL represents a low risk of severe sepsis and/or septic shock.  1. >2 ng/mL represents a high risk of severe sepsis and/or septic shock.    As a Marker for Lower Respiratory Tract Infections that require antibiotic therapy:  PCT on Admission     Antibiotic Therapy             6-12 Hrs later  > 0.5                Strongly Recommended            >0.25 - <0.5         Recommended  0.1 - 0.25           Discouraged                   Remeasure/reassess PCT  <0.1                 Strongly Discouraged          Remeasure/reassess PCT      As 28 day mortality risk marker: \"Change in Procalcitonin Result\" (> 80 % or <=80 %) if Day 0 (or Day 1) and Day 4 values are available. Refer to http://www.[employer]-pct-calculator.com/   Change in PCT <=80 %   A decrease of PCT levels below or equal to 80 % defines a positive change in PCT test result representing a higher risk for 28-day all-cause mortality of patients diagnosed with severe sepsis or septic shock.  Change in PCT > 80 %   A decrease of PCT levels of more than 80 % defines a negative change in PCT result representing a lower risk for 28-day all-cause mortality of patients diagnosed with severe sepsis or septic shock.                Results may be falsely decreased if patient taking Biotin.     BNP [[id number]]  (Normal) Collected:  03/17/20 0300    Specimen:  Blood Updated:  03/17/20 0431     proBNP 1,464.0 pg/mL     Narrative:       Among patients with dyspnea, NT-proBNP is highly sensitive for the detection of acute congestive heart failure. In addition NT-proBNP of <300 pg/ml effectively rules out acute congestive " heart failure with 99% negative predictive value.    Results may be falsely decreased if patient taking Biotin.      CBC & Differential [[id number]] Collected:  03/17/20 0300    Specimen:  Blood Updated:  03/17/20 0409    Narrative:       The following orders were created for panel order CBC & Differential.  Procedure                               Abnormality         Status                     ---------                               -----------         ------                     CBC Auto Differential[[id number]]        Abnormal            Final result                 Please view results for these tests on the individual orders.    CBC Auto Differential [[id number]]  (Abnormal) Collected:  03/17/20 0300    Specimen:  Blood Updated:  03/17/20 0409     WBC 7.70 10*3/mm3      RBC 3.37 10*6/mm3      Hemoglobin 10.5 g/dL      Hematocrit 31.4 %      MCV 93.1 fL      MCH 31.1 pg      MCHC 33.4 g/dL      RDW 13.5 %      RDW-SD 43.8 fl      MPV 6.9 fL      Platelets 271 10*3/mm3      Neutrophil % 72.2 %      Lymphocyte % 17.8 %      Monocyte % 9.2 %      Eosinophil % 0.6 %      Basophil % 0.2 %      Neutrophils, Absolute 5.60 10*3/mm3      Lymphocytes, Absolute 1.40 10*3/mm3      Monocytes, Absolute 0.70 10*3/mm3      Eosinophils, Absolute 0.00 10*3/mm3      Basophils, Absolute 0.00 10*3/mm3      nRBC 0.0 /100 WBC     Vitamin B12 [[id number]] Collected:  03/17/20 0300    Specimen:  Blood Updated:  03/17/20 0354    POC Glucose Once [[id number]]  (Abnormal) Collected:  03/16/20 1922    Specimen:  Blood Updated:  03/16/20 1923     Glucose 123 mg/dL      Comment: Serial Number: [id number]:  [id number]             Imaging Results (Last 24 Hours)     Procedure Component Value Units Date/Time    XR Chest 1 View [[id number]] Collected:  03/17/20 0818     Updated:  03/17/20 0821    Narrative:       DATE OF EXAM:  3/17/2020 8:12 AM     PROCEDURE:  XR CHEST 1 VW-     INDICATIONS:  Fatigue, COPD, cough, coronary artery disease,  hypertension.      COMPARISON:  3/14/2020.     TECHNIQUE:   Single radiographic view of the chest was obtained.     FINDINGS:  The heart size is normal. The patient's had a previous median  sternotomy. The patient also has underlying emphysema. The pulmonary  vascular markings are normal. There are new areas of patchy  consolidation in the right midlung zone and left lung base suspicious  for pneumonia. There is no pleural effusion or pneumothorax.  There are  chronic age-related changes involving the bony thorax and thoracic  aorta.       Impression:          1. Emphysema.  2. New consolidation in the right midlung zone and left lung base  suspicious for pneumonia.     Electronically Signed By-Harman Zimmer On:3/17/2020 8:19 AM  This report was finalized on [id number] by  Harman Zimmer, .          EKG      I personally viewed and interpreted the patient's EKG/Telemetry data:    ECHOCARDIOGRAM:      STRESS MYOVIEW:    CARDIAC CATHETERIZATION:    OTHER:         Assessment/Plan     Active Problems:    Acute renal failure (ARF) (CMS/HCC)    Benign hypertensive heart disease    Cardiomyopathy, ischemic    Chronic obstructive pulmonary disease (CMS/HCC)    Coronary artery disease    Hypertension  Atrial fibrillation with controlled ventricular response  Hyperlipidemia      Patient presented with dermatologist and has acute renal failure and is followed by the nephrologist  Patient has atrial fibrillation is currently on medical therapy but not on anticoagulation because of high risk of bleeding and falls  Patient has history of coronary disease status post coronary artery bypass surgery  Patient is being ruled out for MI by EKG enzymes  Patient has an echocardiogram pending  Patient blood pressure heart rate stable  No further testing at this time.    I discussed the patients findings and my recommendations with nurse  Ender Nicolas MD  03/17/20  09:45

## 2020-03-17 NOTE — PROGRESS NOTES
Continued Stay Note   [facility]     Patient Name: Dipti Perez  MRN: [id number]  Today's Date: 3/17/2020    Admit Date: 3/14/2020    Discharge Plan     Row Name 03/17/20 1701       Plan    Plan  [facility] [place]   Ron spoke to eyal/Dary by phone to inform her [facility] is unable to accept due to an outstanding financial balance. She reported she is no longer wanting in for rehab,requesting [facility].Cm informed        .No social barrier to dc identified.    Darling Black, [facility], LSW  [phone]

## 2020-03-17 NOTE — THERAPY TREATMENT NOTE
Acute Care - Speech Language Pathology   Swallow Treatment Note  [facility]     Patient Name: Dipti Perez  : 1940  MRN: [id number]  Today's Date: 3/17/2020               Admit Date: 3/14/2020    Visit Dx:    No diagnosis found.  Patient Active Problem List   Diagnosis   • Acute renal failure (ARF) (CMS/HCC)   • Benign hypertensive heart disease   • Cardiomyopathy, ischemic   • Chronic obstructive pulmonary disease (CMS/HCC)   • Coronary artery disease   • Hypertension       Therapy Treatment  Rehabilitation Treatment Summary     Row Name 20 1200          Discipline  speech language pathologist  -LF    Document Type  therapy note (daily note)  -LF    Subjective Information  no complaints  -LF    Mode of Treatment  individual therapy;speech-language pathology  -LF    Patient/Family Observations  Pt upright in bed with lunch tray upon entry  -LF    Care Plan Review  patient/other agree to care plan;care plan/treatment goals reviewed  -LF    Patient Effort  good  -LF    Recorded by [LF] Amy Harding SLP 20 1303    Row Name 20 1200          Additional Documentation  General Eating/Swallowing Observations  -LF    Recorded by [LF] Amy Harding SLP 20 1307    Row Name 20 1200          Respiratory Support Currently in Use  room air  -LF    Eating/Swallowing Skills  self-fed;appropriate self-feeding skills observed  -LF    Positioning During Eating  upright 90 degree;upright in bed  -LF    Utensils Used  spoon;straw  -LF    Consistencies Trialed  pureed;thin liquids  -LF    Recorded by [LF] Amy Harding SLP 20 1307    Row Name 20 1200          Daily Summary of Progress (SLP)  progress toward functional goals is good  -LF    Plan for Continued Treatment (SLP)  Rec continue m/s and thin liquid diet. ST will continue to follow  -LF    Recorded by [LF] Amy Harding SLP 20 1307      User Key  (r) = Recorded By, (t) = Taken By, (c) = Cosigned By    Initials  Name Effective Dates Discipline    LF Amy Harding, FRANK 01/02/20 -  SLP          Outcome Summary  Outcome Summary/Treatment Plan (SLP)  Daily Summary of Progress (SLP): progress toward functional goals is good (03/17/20 1200 : Amy Harding, SLP)  Plan for Continued Treatment (SLP): Rec continue m/s and thin liquid diet. ST will continue to follow (03/17/20 1200 : Amy Harding, SLP)      SLP GOALS     Row Name 03/17/20 1300          Oral Nutrition/Hydration Goal 1, SLP  Pt will participate in full meal assessment to assess diet tolerance and make further recommendations as indicated  -LF    Time Frame (Oral Nutrition/Hydration Goal 1, SLP)  1 day;2 days  -LF    Barriers (Oral Nutrition/Hydration Goal 1, SLP)  Pt seen with lunch tray which included tuna salad, vegetable soup, baked carrots, ice cream, and tea by straw. Pt only took one bite of vegetable soup and tuna salad because she did not enjoy the taste. Pt tolerated rest of meal tray with no clinical s/s of aspiration observed at any time. Pt self fed and independently demonstrated safe swallow strategies  -LF    Progress/Outcomes (Oral Nutrition/Hydration Goal 1, SLP)  continuing progress toward goal;goal ongoing  -LF          Oral Nutrition/Hydration Goal 2, SLP  PT/caregiver teaching with additional goals as indicated  -LF    Time Frame (Oral Nutrition/Hydration Goal 2, SLP)  by discharge  -LF    Barriers (Oral Nutrition/Hydration Goal 2, SLP)  See above meal assessment  -LF    Progress/Outcomes (Oral Nutrition/Hydration Goal 2, SLP)  continuing progress toward goal;goal ongoing  -LF      User Key  (r) = Recorded By, (t) = Taken By, (c) = Cosigned By    Initials Name Provider Type    Cyn Trevino, SLP Speech and Language Pathologist    LF Amy Harding, SLP Speech and Language Pathologist          EDUCATION  The patient has been educated in the following areas:   Modified Diet Instruction and safe swallow strategies.    SLP Recommendation and  Plan  Daily Summary of Progress (SLP): progress toward functional goals is good     Plan for Continued Treatment (SLP): Rec continue m/s and thin liquid diet. ST will continue to follow                       Time Calculation:                  Amy Harding, SLP  3/17/2020

## 2020-03-17 NOTE — NURSING NOTE
Held evening meds. Pt briefly responds, grunts. Opens eyes for a few seconds and goes back to sleep. Sternal rub gets a grimace of her face and mummbling' stop'.   held meds for now.. Pt too lethargic to give and to swallow water

## 2020-03-17 NOTE — PROGRESS NOTES
"      [facility] Medicine Services Daily Progress Note      Hospitalist Team  LOS 3 days      Patient Care Team:  Provider, No Known as PCP - General    Patient Location: 267/1      Subjective   Subjective     Chief Complaint / Subjective  Fatigue      Brief Synopsis of Hospital Course/HPI    The patient is an 80-year-old female with past medical history of CAD s/p CABG, COPD, anxiety, depression, hyperlipidemia, hypertension, and dementia that was brought from an outlying hospital after family took her there for altered mental status.  The patient has about 1 month history of worsening confusion, lethargy, poor appetite and malaise.         Date::    3/16/20: Discussed case with daughter who is at the bedside.  Patient admits to depression.  Nephrology following    3/17/20: Evaluated in the a.m.  Nursing reports patient sleeping a lot.  Afebrile.  Decreased dose of gabapentin and Klonopin.      Review of Systems   All other systems reviewed and are negative.        Objective   Objective      Vital Signs  Temp:  [97.3 °F (36.3 °C)-98.9 °F (37.2 °C)] 98.9 °F (37.2 °C)  Heart Rate:  [] 112  Resp:  [17-27] 17  BP: ()/(51-86) 123/73  Oxygen Therapy  SpO2: 92 %  Pulse Oximetry Type: Continuous  Device (Oxygen Therapy): nasal cannula  Flow (L/min): 4  Oxygen Concentration (%): 21  Flowsheet Rows      First Filed Value   Admission Height  160 cm (63\") Documented at 03/14/2020 0500   Admission Weight  54.5 kg (120 lb 2.4 oz) Documented at 03/14/2020 0227        Intake & Output (last 3 days)       03/14 0701 - 03/15 0700 03/15 0701 - 03/16 0700 03/16 0701 - 03/17 0700 03/17 0701 - 03/18 0700    P.O. 250 580 120 480    I.V. (mL/kg) 900 (17.1)       IV Piggyback        Total Intake(mL/kg) 1150 (21.9) 580 (11) 120 (2.4) 480 (9.6)    Urine (mL/kg/hr) 600 (0.5)       Stool 0       Total Output 600       Net +550 +580 +120 +480            Urine Unmeasured Occurrence 1 x  1 x     Stool Unmeasured " Occurrence 3 x 4 x          Lines, Drains & Airways    Active LDAs     Name:   Placement date:   Placement time:   Site:   Days:    Peripheral IV 03/16/20 0150 Posterior;Right Wrist   03/16/20 0150    Wrist   less than 1                  Physical Exam:    Physical Exam   Constitutional: She is oriented to person, place, and time. No distress.   HENT:   Head: Normocephalic and atraumatic.   Eyes: Pupils are equal, round, and reactive to light. EOM are normal.   Neck: Normal range of motion. Neck supple.   Cardiovascular: Normal rate and normal heart sounds.   Pulmonary/Chest: Effort normal and breath sounds normal.   Abdominal: Soft. Bowel sounds are normal.   Musculoskeletal:   Decreased range of motion hips   Lymphadenopathy:     She has no cervical adenopathy.   Neurological: She is alert and oriented to person, place, and time.   Skin: Skin is warm and dry.   Psychiatric: She exhibits a depressed mood.             Procedures:              Results Review:     I reviewed the patient's new clinical results.      Lab Results (last 24 hours)     Procedure Component Value Units Date/Time    Vitamin B12 [[id number]]  (Normal) Collected:  03/17/20 0300    Specimen:  Blood Updated:  03/17/20 1143     Vitamin B-12 377 pg/mL     Narrative:       Results may be falsely increased if patient taking Biotin.      Sedimentation Rate [[id number]]  (Abnormal) Collected:  03/17/20 0300    Specimen:  Blood Updated:  03/17/20 0507     Sed Rate 48 mm/hr     Phosphorus [[id number]]  (Abnormal) Collected:  03/17/20 0300    Specimen:  Blood Updated:  03/17/20 0450     Phosphorus 2.4 mg/dL     Basic Metabolic Panel [[id number]]  (Abnormal) Collected:  03/17/20 0300    Specimen:  Blood Updated:  03/17/20 0441     Glucose 102 mg/dL      BUN 41 mg/dL      Creatinine 1.05 mg/dL      Sodium 146 mmol/L      Potassium 3.7 mmol/L      Chloride 99 mmol/L      CO2 36.0 mmol/L      Calcium 9.0 mg/dL      eGFR Non African Amer 50 mL/min/1.73       "BUN/Creatinine Ratio 39.0     Anion Gap 11.0 mmol/L     Narrative:       GFR Normal >60  Chronic Kidney Disease <60  Kidney Failure <15      C-reactive Protein [[id number]]  (Abnormal) Collected:  03/17/20 0300    Specimen:  Blood Updated:  03/17/20 0441     C-Reactive Protein 7.20 mg/dL     Procalcitonin [[id number]]  (Normal) Collected:  03/17/20 0300    Specimen:  Blood Updated:  03/17/20 0438     Procalcitonin 0.14 ng/mL     Narrative:       As a Marker for Sepsis (Non-Neonates):   1. <0.5 ng/mL represents a low risk of severe sepsis and/or septic shock.  1. >2 ng/mL represents a high risk of severe sepsis and/or septic shock.    As a Marker for Lower Respiratory Tract Infections that require antibiotic therapy:  PCT on Admission     Antibiotic Therapy             6-12 Hrs later  > 0.5                Strongly Recommended            >0.25 - <0.5         Recommended  0.1 - 0.25           Discouraged                   Remeasure/reassess PCT  <0.1                 Strongly Discouraged          Remeasure/reassess PCT      As 28 day mortality risk marker: \"Change in Procalcitonin Result\" (> 80 % or <=80 %) if Day 0 (or Day 1) and Day 4 values are available. Refer to http://www.[employer]-pct-calculator.com/   Change in PCT <=80 %   A decrease of PCT levels below or equal to 80 % defines a positive change in PCT test result representing a higher risk for 28-day all-cause mortality of patients diagnosed with severe sepsis or septic shock.  Change in PCT > 80 %   A decrease of PCT levels of more than 80 % defines a negative change in PCT result representing a lower risk for 28-day all-cause mortality of patients diagnosed with severe sepsis or septic shock.                Results may be falsely decreased if patient taking Biotin.     BNP [[id number]]  (Normal) Collected:  03/17/20 0300    Specimen:  Blood Updated:  03/17/20 0431     proBNP 1,464.0 pg/mL     Narrative:       Among patients with dyspnea, NT-proBNP is highly " sensitive for the detection of acute congestive heart failure. In addition NT-proBNP of <300 pg/ml effectively rules out acute congestive heart failure with 99% negative predictive value.    Results may be falsely decreased if patient taking Biotin.      CBC & Differential [[id number]] Collected:  03/17/20 0300    Specimen:  Blood Updated:  03/17/20 0409    Narrative:       The following orders were created for panel order CBC & Differential.  Procedure                               Abnormality         Status                     ---------                               -----------         ------                     CBC Auto Differential[[id number]]        Abnormal            Final result                 Please view results for these tests on the individual orders.    CBC Auto Differential [[id number]]  (Abnormal) Collected:  03/17/20 0300    Specimen:  Blood Updated:  03/17/20 0409     WBC 7.70 10*3/mm3      RBC 3.37 10*6/mm3      Hemoglobin 10.5 g/dL      Hematocrit 31.4 %      MCV 93.1 fL      MCH 31.1 pg      MCHC 33.4 g/dL      RDW 13.5 %      RDW-SD 43.8 fl      MPV 6.9 fL      Platelets 271 10*3/mm3      Neutrophil % 72.2 %      Lymphocyte % 17.8 %      Monocyte % 9.2 %      Eosinophil % 0.6 %      Basophil % 0.2 %      Neutrophils, Absolute 5.60 10*3/mm3      Lymphocytes, Absolute 1.40 10*3/mm3      Monocytes, Absolute 0.70 10*3/mm3      Eosinophils, Absolute 0.00 10*3/mm3      Basophils, Absolute 0.00 10*3/mm3      nRBC 0.0 /100 WBC     POC Glucose Once [[id number]]  (Abnormal) Collected:  03/16/20 1922    Specimen:  Blood Updated:  03/16/20 1923     Glucose 123 mg/dL      Comment: Serial Number: [id number]:  [id number]           No results found for: HGBA1C            Lab Results   Component Value Date    LIPASE 5 (L) 03/14/2020     Lab Results   Component Value Date    CHOL 175 03/14/2020    TRIG 139 03/14/2020    HDL 53 03/14/2020    LDL 94 03/14/2020       No results found for: INTRAOP, PREDX, FINALDX,  COMDX    Microbiology Results (last 10 days)     Procedure Component Value - Date/Time    Eosinophil Smear - Urine, Urine, Clean Catch [[id number]]  (Normal) Collected:  03/15/20 0251    Lab Status:  Final result Specimen:  Urine, Clean Catch Updated:  03/15/20 0426     Eosinophil Smear 0 % EOS/100 Cells     Urine Culture - Urine, Urine, Clean Catch [[id number]]  (Abnormal) Collected:  03/14/20 0620    Lab Status:  Final result Specimen:  Urine, Clean Catch Updated:  03/16/20 0919     Urine Culture <10,000 CFU/mL Escherichia coli     Comment: Call if further workup needed.               ECG/EMG Results (most recent)     Procedure Component Value Units Date/Time    Adult Transthoracic Echo Complete W/ Cont if Necessary Per Protocol [[id number]] Collected:  03/15/20 1155     Updated:  03/15/20 1716     BSA 1.5 m^2      RVIDd 2.7 cm      IVSd 0.84 cm      LVIDd 2.7 cm      LVIDs 2.0 cm      LVPWd 0.98 cm      IVS/LVPW 0.86     FS 25.5 %      EDV(Teich) 26.2 ml      ESV(Teich) 12.5 ml      EF(Teich) 52.3 %      EDV(cubed) 19.0 ml      ESV(cubed) 7.8 ml      EF(cubed) 58.7 %      LV mass(C)d 60.1 grams      LV mass(C)dI 39.3 grams/m^2      SV(Teich) 13.7 ml      SI(Teich) 9.0 ml/m^2      SV(cubed) 11.2 ml      SI(cubed) 7.3 ml/m^2      Ao root diam 2.6 cm      Ao root area 5.3 cm^2      ACS 1.8 cm      LVOT diam 1.7 cm      LVOT area 2.4 cm^2      EDV(MOD-sp4) 29.6 ml      ESV(MOD-sp4) 18.1 ml      EF(MOD-sp4) 39.0 %      SV(MOD-sp4) 11.5 ml      SI(MOD-sp4) 7.5 ml/m^2      Ao root area (BSA corrected) 1.7     LV Calderon Vol (BSA corrected) 19.4 ml/m^2      LV Sys Vol (BSA corrected) 11.8 ml/m^2      MV E max shaye 95.5 cm/sec      MV A max shaye 97.1 cm/sec      MV E/A 0.98     MV V2 max 108.9 cm/sec      MV max PG 4.7 mmHg      MV V2 mean 76.3 cm/sec      MV mean PG 2.5 mmHg      MV V2 VTI 26.4 cm      MVA(VTI) 2.6 cm^2      MV dec slope 344.7 cm/sec^2      MV dec time 0.28 sec      Ao pk shaye 150.8 cm/sec      Ao max PG 9.1 mmHg       Ao max PG (full) 3.7 mmHg      Ao V2 mean 121.5 cm/sec      Ao mean PG 6.3 mmHg      Ao mean PG (full) 2.8 mmHg      Ao V2 VTI 39.3 cm      ROXANE(I,A) 1.8 cm^2      ROXANE(I,D) 1.8 cm^2      ROXANE(V,A) 1.8 cm^2      ROXANE(V,D) 1.8 cm^2      LV V1 max PG 5.4 mmHg      LV V1 mean PG 3.5 mmHg      LV V1 max 116.3 cm/sec      LV V1 mean 90.7 cm/sec      LV V1 VTI 29.3 cm      SV(Ao) 207.4 ml      SI(Ao) 135.7 ml/m^2      SV(LVOT) 69.2 ml      SI(LVOT) 45.2 ml/m^2      PA V2 max 101.4 cm/sec      PA max PG 4.1 mmHg      PA max PG (full) 2.4 mmHg      RV V1 max PG 1.8 mmHg      RV V1 mean PG 0.98 mmHg      RV V1 max 66.2 cm/sec      RV V1 mean 46.1 cm/sec      RV V1 VTI 17.4 cm       CV ECHO ABDELRAHMAN - BZI_BMI 20.4 kilograms/m^2       CV ECHO ABDELRAHMAN - BSA(HAYCOCK) 1.5 m^2       CV ECHO ABDELRAHMAN - BZI_METRIC_WEIGHT 52.2 kg       CV ECHO ABDELRAHMAN - BZI_METRIC_HEIGHT 160.0 cm      EF(MOD-bp) 39.0 %      LA dimension(2D) 2.6 cm      Echo EF Estimated 60 %     Narrative:         · Estimated EF = 60%.  · Left ventricular systolic function is normal.     Indications  Hypertension    Technically satisfactory study.  Mitral valve is thickened with adequate opening motion.  Mitral annular   calcification is present.  Tricuspid valve is structurally normal.  Aortic valve is thickened with adequate opening motion.  Pulmonic valve could not be well visualized.  No evidence for mitral tricuspid or aortic regurgitation is seen by   Doppler study.  Left atrium is normal in size.  Right atrium is normal in size.  Left ventricle is normal in size and contractility with ejection fraction   of 60%.  Right ventricle is normal in size.  Atrial septum is intact.  Aorta is normal.  No pericardial effusion or intracardiac thrombus is seen.    Impression  Thickened mitral and aortic valves with adequate opening motion.  Left ventricular size and contractility is normal with ejection fraction   of 60%  No pericardial effusion or intracardiac thrombus is  seen.        ECG 12 Lead [[id number]] Collected:  03/17/20 1249     Updated:  03/17/20 1251    Narrative:       HEART RATE= 116  bpm  RR Interval= 517  ms  CA Interval=   ms  P Horizontal Axis=   deg  P Front Axis=   deg  QRSD Interval= 92  ms  QT Interval= 322  ms  QRS Axis= 45  deg  T Wave Axis= 48  deg  - ABNORMAL ECG -  Atrial fibrillation  Anteroseptal infarct, age indeterminate  No previous ECG available for comparison  Electronically Signed By:   Date and Time of Study: 2020-03-17 12:49:57               Results for orders placed during the hospital encounter of 03/14/20   Adult Transthoracic Echo Complete W/ Cont if Necessary Per Protocol    Narrative · Estimated EF = 60%.  · Left ventricular systolic function is normal.     Indications  Hypertension    Technically satisfactory study.  Mitral valve is thickened with adequate opening motion.  Mitral annular   calcification is present.  Tricuspid valve is structurally normal.  Aortic valve is thickened with adequate opening motion.  Pulmonic valve could not be well visualized.  No evidence for mitral tricuspid or aortic regurgitation is seen by   Doppler study.  Left atrium is normal in size.  Right atrium is normal in size.  Left ventricle is normal in size and contractility with ejection fraction   of 60%.  Right ventricle is normal in size.  Atrial septum is intact.  Aorta is normal.  No pericardial effusion or intracardiac thrombus is seen.    Impression  Thickened mitral and aortic valves with adequate opening motion.  Left ventricular size and contractility is normal with ejection fraction   of 60%  No pericardial effusion or intracardiac thrombus is seen.           Ct Head Without Contrast    Result Date: 3/14/2020  1. No acute intracranial abnormalities by CT examination. 2. Mild to moderate volume loss and chronic microvascular ischemic changes. Arteriosclerosis. 3. Remote infarcts within the cerebellum bilaterally. Electronically signed by:  Devante  DARBY Irvin  3/14/2020 6:13 PM    Xr Chest 1 View    Result Date: 3/17/2020   1. Emphysema. 2. New consolidation in the right midlung zone and left lung base suspicious for pneumonia.  Electronically Signed By-Harman Zimmer On:3/17/2020 8:19 AM This report was finalized on [id number] by  Harman Zimmer, .    Xr Chest 1 View    Result Date: 3/15/2020  No acute infiltrate is appreciated.  Electronically Signed By-Dr. Luis Rock MD On:3/15/2020 7:08 AM This report was finalized on [id number] by Dr. Luis Rock MD.    Us Renal Bilateral    Result Date: 3/14/2020   1. The right and left kidneys show no evidence of stone or mass or obstruction. 2. No focal abnormality seen in the wall the moderately distended urinary bladder.  Electronically Signed By-DR. Jamie Sims MD On:3/14/2020 11:52 PM This report was finalized on [id number] by DR. Jamie Sims MD.          Xrays, labs reviewed personally by physician.    Medication Review:   I have reviewed the patient's current medication list      Scheduled Meds    [START ON 3/18/2020] clonazePAM 0.5 mg Oral Daily   clopidogrel 75 mg Oral Daily   enoxaparin 30 mg Subcutaneous Daily   gabapentin 100 mg Oral TID   ipratropium-albuterol 3 mL Nebulization 4x Daily - RT   metoprolol succinate XL 50 mg Oral Daily   predniSONE 5 mg Oral Daily   sodium chloride 10 mL Intravenous Q12H   traZODone 50 mg Oral Nightly       Meds Infusions       Meds PRN  •  acetaminophen **OR** acetaminophen **OR** acetaminophen  •  ondansetron **OR** ondansetron  •  sodium chloride      Assessment/Plan   Assessment/Plan     Active Hospital Problems    Diagnosis  POA   • Acute renal failure (ARF) (CMS/HCC) [N17.9]  Yes   • Cardiomyopathy, ischemic [I25.5]  Yes   • Chronic obstructive pulmonary disease (CMS/HCC) [J44.9]  Yes   • Coronary artery disease [I25.10]  Yes   • Hypertension [I10]  Yes   • Benign hypertensive heart disease [I11.9]  Yes      Resolved Hospital Problems      No resolved problems to display.       MEDICAL DECISION MAKING COMPLEXITY BY PROBLEM:     New atrial fibrillation 3/17/20:  -Consulted cardiology  -TSH checked 3/14/2020  -TTE 3/14/20--> EF 60%    Acute renal failure  - Improved with IVF  - Nephrology consulted     Altered mental status  - Suspect acute metabolic encephalopathy that is multifactorial  - s/p CT head without contrast 3/14/20--> CVA ruled out  --Decrease doses of Klonopin and gabapentin     Gram-negative bacteriuria  - Gram-negative bacilli in urine culture however colony count is less than 25,000 which could be colonization/contaminant, or a partially treated urine culture  - Follow-up on urine culture result  - No signs or symptoms of sepsis; doubt active infection is present  - s/p Rocephin in ED; currently not on any antibiotic     CAD s/p CABG:  -Managed by primary care  -Has not seen a cardiologist since her original surgery done 20 years ago by Dr. Rankin  -TTE 3/14/20--> EF 60%  - Continue metoprolol and Plavix     Hypertension  - Continue metoprolol  --lisinopril held because of STEPHEN     COPD  -- without exacerbation  - Continue duo nebs, prednisone    SUKUMAR:  - continue home CPAP at night    Chronic hypoxemic respiratory failure:  - Continue supplemental oxygen at 2 L via nasal cannula as she has at home     Chronic pain  - Continue gabapentin    Anxiety depression:  --On Klonopin, trazodone at home    VTE Prophylaxis -   Mechanical Order History:     None      Pharmalogical Order History:     Ordered     Dose Route Frequency Stop    03/14/20 0240  enoxaparin (LOVENOX) syringe 30 mg      30 mg SC Daily --            Code Status -   Code Status and Medical Interventions:   Ordered at: 03/14/20 0241     Code Status:    CPR     Medical Interventions (Level of Support Prior to Arrest):    Full       Discharge Planning          Destination      Service Provider Request Status Selected Services Address Phone Number Fax Number    [facility]  CAMP[facility] Pending - Request Sent N/A [address], [place] [address] [phone]    [facility] Declined  incorrect referral N/A [address], [place] [address] [phone]      Durable Medical Equipment      Coordination has not been started for this encounter.      Dialysis/Infusion      Coordination has not been started for this encounter.      Home Medical Care      Coordination has not been started for this encounter.      Therapy      Coordination has not been started for this encounter.      Community Resources      Coordination has not been started for this encounter.            Electronically signed by Mian Car DO, 03/17/20, 18:34.  [facility]ist Team

## 2020-03-18 VITALS
BODY MASS INDEX: 19.45 KG/M2 | RESPIRATION RATE: 23 BRPM | TEMPERATURE: 97.9 F | DIASTOLIC BLOOD PRESSURE: 77 MMHG | HEIGHT: 63 IN | HEART RATE: 106 BPM | OXYGEN SATURATION: 100 % | WEIGHT: 109.79 LBS | SYSTOLIC BLOOD PRESSURE: 134 MMHG

## 2020-03-18 PROBLEM — G93.41 ACUTE METABOLIC ENCEPHALOPATHY: Status: ACTIVE | Noted: 2020-03-18

## 2020-03-18 PROBLEM — G93.41 ACUTE METABOLIC ENCEPHALOPATHY: Status: RESOLVED | Noted: 2020-03-18 | Resolved: 2020-03-18

## 2020-03-18 PROBLEM — E44.0 MODERATE PROTEIN-CALORIE MALNUTRITION (HCC): Status: ACTIVE | Noted: 2020-03-18

## 2020-03-18 PROBLEM — N17.9 ACUTE RENAL FAILURE (ARF) (HCC): Status: RESOLVED | Noted: 2020-03-14 | Resolved: 2020-03-18

## 2020-03-18 PROBLEM — Z95.1 HX OF CABG: Status: ACTIVE | Noted: 2020-03-18

## 2020-03-18 PROBLEM — I48.91 NEW ONSET ATRIAL FIBRILLATION (HCC): Status: ACTIVE | Noted: 2020-03-18

## 2020-03-18 PROBLEM — Z79.899 POLYPHARMACY: Status: ACTIVE | Noted: 2020-03-18

## 2020-03-18 LAB
ANION GAP SERPL CALCULATED.3IONS-SCNC: 10 MMOL/L (ref 5–15)
BASOPHILS # BLD AUTO: 0 10*3/MM3 (ref 0–0.2)
BASOPHILS NFR BLD AUTO: 0.2 % (ref 0–1.5)
BUN BLD-MCNC: 32 MG/DL (ref 8–23)
BUN/CREAT SERPL: 35.6 (ref 7–25)
CALCIUM SPEC-SCNC: 9.3 MG/DL (ref 8.6–10.5)
CHLORIDE SERPL-SCNC: 99 MMOL/L (ref 98–107)
CO2 SERPL-SCNC: 34 MMOL/L (ref 22–29)
CREAT BLD-MCNC: 0.9 MG/DL (ref 0.57–1)
DEPRECATED RDW RBC AUTO: 46.4 FL (ref 37–54)
EOSINOPHIL # BLD AUTO: 0.1 10*3/MM3 (ref 0–0.4)
EOSINOPHIL NFR BLD AUTO: 0.9 % (ref 0.3–6.2)
ERYTHROCYTE [DISTWIDTH] IN BLOOD BY AUTOMATED COUNT: 13.8 % (ref 12.3–15.4)
GFR SERPL CREATININE-BSD FRML MDRD: 60 ML/MIN/1.73
GLUCOSE BLD-MCNC: 104 MG/DL (ref 65–99)
HCT VFR BLD AUTO: 33.3 % (ref 34–46.6)
HGB BLD-MCNC: 11.1 G/DL (ref 12–15.9)
LYMPHOCYTES # BLD AUTO: 1.9 10*3/MM3 (ref 0.7–3.1)
LYMPHOCYTES NFR BLD AUTO: 23.1 % (ref 19.6–45.3)
MCH RBC QN AUTO: 31.4 PG (ref 26.6–33)
MCHC RBC AUTO-ENTMCNC: 33.2 G/DL (ref 31.5–35.7)
MCV RBC AUTO: 94.5 FL (ref 79–97)
MONOCYTES # BLD AUTO: 0.9 10*3/MM3 (ref 0.1–0.9)
MONOCYTES NFR BLD AUTO: 11.3 % (ref 5–12)
NEUTROPHILS # BLD AUTO: 5.3 10*3/MM3 (ref 1.7–7)
NEUTROPHILS NFR BLD AUTO: 64.5 % (ref 42.7–76)
NRBC BLD AUTO-RTO: 0.1 /100 WBC (ref 0–0.2)
NT-PROBNP SERPL-MCNC: 1088 PG/ML (ref 5–1800)
PHOSPHATE SERPL-MCNC: 1.6 MG/DL (ref 2.5–4.5)
PLATELET # BLD AUTO: 281 10*3/MM3 (ref 140–450)
PMV BLD AUTO: 6.8 FL (ref 6–12)
POTASSIUM BLD-SCNC: 3.9 MMOL/L (ref 3.5–5.2)
RBC # BLD AUTO: 3.52 10*6/MM3 (ref 3.77–5.28)
SODIUM BLD-SCNC: 143 MMOL/L (ref 136–145)
WBC NRBC COR # BLD: 8.2 10*3/MM3 (ref 3.4–10.8)

## 2020-03-18 PROCEDURE — 84100 ASSAY OF PHOSPHORUS: CPT | Performed by: INTERNAL MEDICINE

## 2020-03-18 PROCEDURE — 85025 COMPLETE CBC W/AUTO DIFF WBC: CPT | Performed by: INTERNAL MEDICINE

## 2020-03-18 PROCEDURE — 94799 UNLISTED PULMONARY SVC/PX: CPT

## 2020-03-18 PROCEDURE — 99238 HOSP IP/OBS DSCHRG MGMT 30/<: CPT | Performed by: HOSPITALIST

## 2020-03-18 PROCEDURE — 97530 THERAPEUTIC ACTIVITIES: CPT

## 2020-03-18 PROCEDURE — 25010000002 ENOXAPARIN PER 10 MG: Performed by: NURSE PRACTITIONER

## 2020-03-18 PROCEDURE — 93010 ELECTROCARDIOGRAM REPORT: CPT | Performed by: INTERNAL MEDICINE

## 2020-03-18 PROCEDURE — 63710000001 PREDNISONE PER 5 MG: Performed by: NURSE PRACTITIONER

## 2020-03-18 PROCEDURE — 97116 GAIT TRAINING THERAPY: CPT

## 2020-03-18 PROCEDURE — 83880 ASSAY OF NATRIURETIC PEPTIDE: CPT | Performed by: INTERNAL MEDICINE

## 2020-03-18 PROCEDURE — 92526 ORAL FUNCTION THERAPY: CPT

## 2020-03-18 PROCEDURE — 99232 SBSQ HOSP IP/OBS MODERATE 35: CPT | Performed by: INTERNAL MEDICINE

## 2020-03-18 PROCEDURE — 80048 BASIC METABOLIC PNL TOTAL CA: CPT | Performed by: INTERNAL MEDICINE

## 2020-03-18 PROCEDURE — 97535 SELF CARE MNGMENT TRAINING: CPT

## 2020-03-18 RX ORDER — CLONAZEPAM 0.5 MG/1
0.5 TABLET ORAL NIGHTLY PRN
Qty: 10 TABLET | Refills: 0 | Status: SHIPPED | OUTPATIENT
Start: 2020-03-18 | End: 2021-10-09 | Stop reason: HOSPADM

## 2020-03-18 RX ORDER — MAGNESIUM SULFATE HEPTAHYDRATE 40 MG/ML
2 INJECTION, SOLUTION INTRAVENOUS AS NEEDED
Status: DISCONTINUED | OUTPATIENT
Start: 2020-03-18 | End: 2020-03-18 | Stop reason: HOSPADM

## 2020-03-18 RX ORDER — POTASSIUM CHLORIDE 20 MEQ/1
40 TABLET, EXTENDED RELEASE ORAL AS NEEDED
Status: DISCONTINUED | OUTPATIENT
Start: 2020-03-18 | End: 2020-03-18 | Stop reason: HOSPADM

## 2020-03-18 RX ORDER — POTASSIUM CHLORIDE 1.5 G/1.77G
40 POWDER, FOR SOLUTION ORAL AS NEEDED
Status: DISCONTINUED | OUTPATIENT
Start: 2020-03-18 | End: 2020-03-18 | Stop reason: HOSPADM

## 2020-03-18 RX ORDER — THIAMINE HCL 100 MG
100 TABLET ORAL DAILY
Status: DISCONTINUED | OUTPATIENT
Start: 2020-03-18 | End: 2020-03-18 | Stop reason: HOSPADM

## 2020-03-18 RX ORDER — LANOLIN ALCOHOL/MO/W.PET/CERES
100 CREAM (GRAM) TOPICAL DAILY
Qty: 30 TABLET | Refills: 0 | Status: SHIPPED | OUTPATIENT
Start: 2020-03-19

## 2020-03-18 RX ORDER — MAGNESIUM SULFATE HEPTAHYDRATE 40 MG/ML
4 INJECTION, SOLUTION INTRAVENOUS AS NEEDED
Status: DISCONTINUED | OUTPATIENT
Start: 2020-03-18 | End: 2020-03-18 | Stop reason: HOSPADM

## 2020-03-18 RX ORDER — GABAPENTIN 100 MG/1
100 CAPSULE ORAL 3 TIMES DAILY
Qty: 30 CAPSULE | Refills: 0 | Status: SHIPPED | OUTPATIENT
Start: 2020-03-18 | End: 2021-10-09 | Stop reason: HOSPADM

## 2020-03-18 RX ADMIN — GABAPENTIN 100 MG: 100 CAPSULE ORAL at 16:54

## 2020-03-18 RX ADMIN — PREDNISONE 5 MG: 5 TABLET ORAL at 10:53

## 2020-03-18 RX ADMIN — GABAPENTIN 100 MG: 100 CAPSULE ORAL at 10:53

## 2020-03-18 RX ADMIN — IPRATROPIUM BROMIDE AND ALBUTEROL SULFATE 3 ML: .5; 3 SOLUTION RESPIRATORY (INHALATION) at 11:16

## 2020-03-18 RX ADMIN — Medication 100 MG: at 12:58

## 2020-03-18 RX ADMIN — Medication 10 ML: at 10:46

## 2020-03-18 RX ADMIN — METOPROLOL SUCCINATE 50 MG: 50 TABLET, EXTENDED RELEASE ORAL at 10:54

## 2020-03-18 RX ADMIN — CLONAZEPAM 0.5 MG: 0.5 TABLET ORAL at 10:53

## 2020-03-18 RX ADMIN — ENOXAPARIN SODIUM 30 MG: 30 INJECTION SUBCUTANEOUS at 16:54

## 2020-03-18 RX ADMIN — CLOPIDOGREL BISULFATE 75 MG: 75 TABLET ORAL at 10:56

## 2020-03-18 RX ADMIN — IPRATROPIUM BROMIDE AND ALBUTEROL SULFATE 3 ML: .5; 3 SOLUTION RESPIRATORY (INHALATION) at 07:13

## 2020-03-18 RX ADMIN — POTASSIUM & SODIUM PHOSPHATES POWDER PACK 280-160-250 MG 2 PACKET: 280-160-250 PACK at 10:48

## 2020-03-18 RX ADMIN — IPRATROPIUM BROMIDE AND ALBUTEROL SULFATE 3 ML: .5; 3 SOLUTION RESPIRATORY (INHALATION) at 15:26

## 2020-03-18 NOTE — PROGRESS NOTES
"Referring Provider: Hospitalist    Reason for follow-up: Atrial fibrillation     Patient Care Team:  Provider, No Known as PCP - General    Subjective .  No chest pain or shortness of breath    Objective  In bed comfortably     Review of Systems   Constitution: Negative for fever and malaise/fatigue.   Cardiovascular: Negative for chest pain, dyspnea on exertion and palpitations.   Respiratory: Negative for cough and shortness of breath.    Skin: Negative for rash.   Gastrointestinal: Negative for abdominal pain, nausea and vomiting.   Neurological: Negative for focal weakness and headaches.   All other systems reviewed and are negative.      Keflex [cephalexin]    Scheduled Meds:    clonazePAM 0.5 mg Oral Daily   clopidogrel 75 mg Oral Daily   enoxaparin 30 mg Subcutaneous Daily   gabapentin 100 mg Oral TID   ipratropium-albuterol 3 mL Nebulization 4x Daily - RT   metoprolol succinate XL 50 mg Oral Daily   predniSONE 5 mg Oral Daily   sodium chloride 10 mL Intravenous Q12H   traZODone 50 mg Oral Nightly   thiamine 100 mg Oral Daily     Continuous Infusions:   PRN Meds:.•  acetaminophen **OR** acetaminophen **OR** acetaminophen  •  magnesium sulfate **OR** magnesium sulfate **OR** magnesium sulfate  •  ondansetron **OR** ondansetron  •  potassium & sodium phosphates **OR** potassium & sodium phosphates  •  potassium chloride  •  potassium chloride  •  sodium chloride        VITAL SIGNS  Vitals:    03/18/20 1053 03/18/20 1100 03/18/20 1116 03/18/20 1120   BP: 115/67      BP Location:       Patient Position:       Pulse: 101 100 106    Resp:  21 14 14   Temp:       TempSrc:       SpO2:  98% 97%    Weight:       Height:           Flowsheet Rows      First Filed Value   Admission Height  160 cm (63\") Documented at 03/14/2020 0500   Admission Weight  54.5 kg (120 lb 2.4 oz) Documented at 03/14/2020 0227           TELEMETRY: Atrial fibrillation with controlled ventricular response    Physical Exam:  Physical Exam   "   Constitutional: She appears well-developed and well-nourished.   HENT:   Head: Normocephalic and atraumatic.   Eyes: Conjunctivae are normal. No scleral icterus.   Neck: Normal range of motion. Neck supple. No JVD present. Carotid bruit is not present.   Cardiovascular: Normal rate, S1 normal, S2 normal, normal heart sounds and intact distal pulses. An irregularly irregular rhythm present. PMI is not displaced.   Pulmonary/Chest: Effort normal and breath sounds normal. She has no wheezes. She has no rales.   Abdominal: Soft. Bowel sounds are normal.   Neurological: She is alert. She has normal strength.   Skin: Skin is warm and dry. No rash noted.        Results Review:   I reviewed the patient's new clinical results.  Lab Results (last 24 hours)     Procedure Component Value Units Date/Time    Phosphorus [[id number]]  (Abnormal) Collected:  03/18/20 0352    Specimen:  Blood Updated:  03/18/20 0515     Phosphorus 1.6 mg/dL     Basic Metabolic Panel [[id number]]  (Abnormal) Collected:  03/18/20 0352    Specimen:  Blood Updated:  03/18/20 0512     Glucose 104 mg/dL      BUN 32 mg/dL      Creatinine 0.90 mg/dL      Sodium 143 mmol/L      Potassium 3.9 mmol/L      Chloride 99 mmol/L      CO2 34.0 mmol/L      Calcium 9.3 mg/dL      eGFR Non African Amer 60 mL/min/1.73      BUN/Creatinine Ratio 35.6     Anion Gap 10.0 mmol/L     Narrative:       GFR Normal >60  Chronic Kidney Disease <60  Kidney Failure <15      BNP [[id number]]  (Normal) Collected:  03/18/20 0352    Specimen:  Blood Updated:  03/18/20 0507     proBNP 1,088.0 pg/mL     Narrative:       Among patients with dyspnea, NT-proBNP is highly sensitive for the detection of acute congestive heart failure. In addition NT-proBNP of <300 pg/ml effectively rules out acute congestive heart failure with 99% negative predictive value.    Results may be falsely decreased if patient taking Biotin.      CBC & Differential [[id number]] Collected:  03/18/20 0353    Specimen:   Blood Updated:  03/18/20 0446    Narrative:       The following orders were created for panel order CBC & Differential.  Procedure                               Abnormality         Status                     ---------                               -----------         ------                     CBC Auto Differential[[id number]]        Abnormal            Final result                 Please view results for these tests on the individual orders.    CBC Auto Differential [[id number]]  (Abnormal) Collected:  03/18/20 0353    Specimen:  Blood Updated:  03/18/20 0446     WBC 8.20 10*3/mm3      RBC 3.52 10*6/mm3      Hemoglobin 11.1 g/dL      Hematocrit 33.3 %      MCV 94.5 fL      MCH 31.4 pg      MCHC 33.2 g/dL      RDW 13.8 %      RDW-SD 46.4 fl      MPV 6.8 fL      Platelets 281 10*3/mm3      Neutrophil % 64.5 %      Lymphocyte % 23.1 %      Monocyte % 11.3 %      Eosinophil % 0.9 %      Basophil % 0.2 %      Neutrophils, Absolute 5.30 10*3/mm3      Lymphocytes, Absolute 1.90 10*3/mm3      Monocytes, Absolute 0.90 10*3/mm3      Eosinophils, Absolute 0.10 10*3/mm3      Basophils, Absolute 0.00 10*3/mm3      nRBC 0.1 /100 WBC           Imaging Results (Last 24 Hours)     ** No results found for the last 24 hours. **          EKG      I personally viewed and interpreted the patient's EKG/Telemetry data:    ECHOCARDIOGRAM:    STRESS MYOVIEW:    CARDIAC CATHETERIZATION:    OTHER:         Assessment/Plan     Active Problems:    Acute renal failure (ARF) (CMS/[facility])    Benign hypertensive heart disease    Cardiomyopathy, ischemic    Chronic obstructive pulmonary disease (CMS/HCC)    Coronary artery disease    Hypertension  Atrial fibrillation    Patient presented with acute renal failure and has mental status changes  Patient is followed by a nephrologist and is doing well  Patient has history of coronary status post bypass surgery  Patient also has congestive heart rate with LV dysfunction is currently on medical therapy  Patient  has atrial fibrillation but was not on anticoagulation in the past.  If patient's risk is low then can be placed on Eliquis 2.5 mg twice daily but otherwise if she has risk of falls and bleeding then will not place her on any anticoagulation.  Patient's family will be appraised of her risk for stroke.  I discussed the patients findings and my recommendations with patient and nurse    Ender Nicolas MD  03/18/20  13:39

## 2020-03-18 NOTE — THERAPY TREATMENT NOTE
Patient Name: Dipti Perez  : 1940    MRN: [id number]                              Today's Date: 3/18/2020       Admit Date: 3/14/2020    Visit Dx: No diagnosis found.  Patient Active Problem List   Diagnosis   • Acute renal failure (ARF) (CMS/HCC)   • Benign hypertensive heart disease   • Cardiomyopathy, ischemic   • Chronic obstructive pulmonary disease (CMS/HCC)   • Coronary artery disease   • Hypertension     Past Medical History:   Diagnosis Date   • CAD (coronary artery disease)    • CHF (congestive heart failure) (CMS/HCC)    • COPD (chronic obstructive pulmonary disease) (CMS/HCC)    • Dementia (CMS/HCC)    • Elevated cholesterol    • Hyperlipidemia    • Hypertension      Past Surgical History:   Procedure Laterality Date   • CARDIAC SURGERY     • CORONARY ARTERY BYPASS GRAFT     • SPINE SURGERY       General Information     Row Name 20 1058          PT Evaluation Time/Intention    Document Type  therapy note (daily note)  -     Mode of Treatment  physical therapy;co-treatment  -     Row Name 20 1058          General Information    Existing Precautions/Restrictions  fall  -       User Key  (r) = Recorded By, (t) = Taken By, (c) = Cosigned By    Initials Name Provider Type     Ermelinda Cast, PT Physical Therapist        Mobility     Row Name 20 1059          Bed Mobility Assessment/Treatment    Supine-Sit [place] (Bed Mobility)  minimum assist (75% patient effort)  -     Assistive Device (Bed Mobility)  head of bed elevated  -     Row Name 20 1059          Transfer Assessment/Treatment    Comment (Transfers)  pt performs transfers from bed x 2, from toilet and to recliner chair. Pt with poor safety. Requires cues for hand placement and body positioning prior to sitting. Pt demos impulsve behavior and is at high risk for falls.   -     Row Name 20 1059          Sit-Stand Transfer    Sit-Stand [place] (Transfers)  minimum assist (75%  patient effort)  -SS     Assistive Device (Sit-Stand Transfers)  walker, front-wheeled  -SS     Row Name 03/18/20 1059          Gait/Stairs Assessment/Training    [place] Level (Gait)  minimum assist (75% patient effort);2 person assist;1 person to manage equipment  -     Assistive Device (Gait)  walker, front-wheeled  -SS     Distance in Feet (Gait)  20' x 3  -SS     Deviations/Abnormal Patterns (Gait)  gait speed decreased  -SS     Bilateral Gait Deviations  forward flexed posture  -SS     Comment (Gait/Stairs)  pt significantly forward flexed with standing and requires max cues for upright posture. Pt impulsive with turns, wrapping O2 tubing around herself despite cues to stop. She places her hands in various incorrect places on walker and requires several cues for proper hand placement. Pt often stops and leans on walker placing her trunk parallel with the floor putting her at risk for anterior LOB.   -SS       User Key  (r) = Recorded By, (t) = Taken By, (c) = Cosigned By    Initials Name Provider Type    Ermelinda Solo PT Physical Therapist        Obj/Interventions     Row Name 03/18/20 1104          Dynamic Standing Balance    Level of [place], Reaches Outside Midline (Standing, Dynamic Balance)  moderate assist, 50 to 74% patient effort  -SS     Time Able to Maintain Position, Reaches Outside Midline (Standing, Dynamic Balance)  2 to 3 minutes  -     Assistive Device Utilized (Supported Standing, Dynamic Balance)  walker, rolling  -SS     Comment, Reaches Outside Midline (Standing, Dynamic Balance)  pt performs hand washing, hair brushing and reaching at sink. Significantly forward flexed posture despite cues, pt requires UE assist on RW or sink for balance. Poor dyanamic standing balance.   -SS       User Key  (r) = Recorded By, (t) = Taken By, (c) = Cosigned By    Initials Name Provider Type    Ermelinda Solo, PT Physical Therapist        Goals/Plan    No documentation.        Clinical Impression     Row Name 03/18/20 1105          Pain Scale: FACES Pre/Post-Treatment    Pain: FACES Scale, Pretreatment  0-->no hurt  -SS     Pain: FACES Scale, Post-Treatment  0-->no hurt  -SS     Row Name 03/18/20 1105          Plan of Care Review    Plan of Care Reviewed With  patient  -SS     Outcome Summary  Patient continues to demonstrate poor safety with all functional mobility. She requires A of 2 to manage tubes and lines, provide max cues for safety and min A for balance. Pt demos confusion, poor problem solving, impulsivity, poor sequencing. Pt with significantly forward flexed posture- trunk parallel with the floor without max cues frequently to improve. She chronically requires 2L o2 and is not safe with tubing or aware of management of lines during mobility. Patient will require IP rehab unless she has 24/7 assist at home for safety due to her high falls risk and impulsivity.   -SS     Row Name 03/18/20 1105          Positioning and Restraints    Pre-Treatment Position  in bed  -SS     Post Treatment Position  chair  -SS     In Chair  exit alarm on;notified nsg  -       User Key  (r) = Recorded By, (t) = Taken By, (c) = Cosigned By    Initials Name Provider Type    Ermelinda Solo, PT Physical Therapist        Outcome Measures    No documentation.         PT Recommendation and Plan     Outcome Summary/Treatment Plan (PT)  Anticipated Discharge Disposition (PT): inpatient rehabilitation facility  Plan of Care Reviewed With: patient  Outcome Summary: Patient continues to demonstrate poor safety with all functional mobility. She requires A of 2 to manage tubes and lines, provide max cues for safety and min A for balance. Pt demos confusion, poor problem solving, impulsivity, poor sequencing. Pt with significantly forward flexed posture- trunk parallel with the floor without max cues frequently to improve. She chronically requires 2L o2 and is not safe with tubing or aware of  management of lines during mobility. Patient will require IP rehab unless she has 24/7 assist at home for safety due to her high falls risk and impulsivity.      Time Calculation:   PT Charges     Row Name 03/18/20 1114             Time Calculation    Start Time  0924  -SS      Stop Time  0950  -      Time Calculation (min)  26 min  -SS      PT Received On  03/18/20  -      PT - Next Appointment  03/20/20  -         Time Calculation- PT    Total Timed Code Minutes- PT  26 minute(s)  -        User Key  (r) = Recorded By, (t) = Taken By, (c) = Cosigned By    Initials Name Provider Type     Ermelinda Cast, PT Physical Therapist        Therapy Charges for Today     Code Description Service Date Service Provider Modifiers Qty    62294835597 HC GAIT TRAINING EA 15 MIN 3/18/2020 Ermelinda Cast, PT GP 1    [id number]  PT THERAPEUTIC ACT EA 15 MIN 3/18/2020 Ermelinda Cast PT GP 1               Ermelinda Cast PT  3/18/2020

## 2020-03-18 NOTE — THERAPY TREATMENT NOTE
Acute Care - Speech Language Pathology   Swallow Progress Note  [facility]     Patient Name: Dipti Perez  : 1940  MRN: [id number]  Today's Date: 3/18/2020               Admit Date: 3/14/2020    Visit Dx:      ICD-10-CM ICD-9-CM   1. Anxiety disorder, unspecified type F41.9 300.00   2. Acute renal failure, unspecified acute renal failure type (CMS/HCC) N17.9 584.9     Patient Active Problem List   Diagnosis   • Benign hypertensive heart disease   • Cardiomyopathy, ischemic   • Chronic obstructive pulmonary disease (CMS/HCC)   • Coronary artery disease   • Hypertension   • New onset atrial fibrillation (CMS/HCC)   • Moderate protein-calorie malnutrition (CMS/HCC)   • Polypharmacy   • Hx of CABG       Therapy Treatment      Outcome Summary         SLP GOALS     Row Name 20 1500       Oral Nutrition/Hydration Goal 1 (SLP)    Oral Nutrition/Hydration Goal 1, SLP  Pt will participate in full meal assessment to assess diet tolerance and make further recommendations as indicated  -SM    Time Frame (Oral Nutrition/Hydration Goal 1, SLP)  1 day;2 days  -SM    Barriers (Oral Nutrition/Hydration Goal 1, SLP)  Pt assessed today sitting up on the side of her bed in her room during established noon meal. Pt had mech soft tray present consisting of thin tea and lemonade, pureed (pudding and blended soup) and mech soft items (green beans, chicken salad and peaches). Pt agreeable to trials of thin lemonade and tea, had already eaten all of pudding and did take entire cup of peaches. Pt did not want to try soup, chicken salad and green beans. Labial seal is adequate. No anterior spillage noted. Pt with only few natural dentition. Slow but functional mastication of peaches noted. Pt is able to clear oral cavity effectively. No overt s/s of difficulty noted.   -SM    Progress/Outcomes (Oral Nutrition/Hydration Goal 1, SLP)  continuing progress toward goal;good progress toward goal  -SM       Oral Nutrition/Hydration Goal  2 (SLP)    Oral Nutrition/Hydration Goal 2, SLP  PT/caregiver teaching with additional goals as indicated  -SM    Time Frame (Oral Nutrition/Hydration Goal 2, SLP)  by discharge  -SM    Barriers (Oral Nutrition/Hydration Goal 2, SLP)  Much teaching conducted with pt. No family present. Awaiting plan for discharge.   -SM    Progress/Outcomes (Oral Nutrition/Hydration Goal 2, SLP)  continuing progress toward goal;goal ongoing  -SM       Oral Nutrition/Hydration Goal (SLP)    Oral Nutrition/Hydration Goal, SLP  --                                                              User Key  (r) = Recorded By, (t) = Taken By, (c) = Cosigned By    Initials Name Provider Type    Cyn Trevino, SLP Speech and Language Pathologist    Amy Varma, SLP Speech and Language Pathologist          EDUCATION  The patient has been educated in the following areas:   Dysphagia (Swallowing Impairment).    SLP Recommendation and Plan                                Time Calculation:                  FRANK Spangler  3/18/2020

## 2020-03-18 NOTE — PLAN OF CARE
Problem: Patient Care Overview  Goal: Plan of Care Review  Outcome: Ongoing (interventions implemented as appropriate)  Flowsheets (Taken 3/18/2020 4937)  Outcome Summary: Pt demostrates minor improvement in strength, though very impulsive and requires cues for use of rwx.  She is unsteady and requires assist for balance on multiple occasions.  She is unaware of lines on often wraps herself in O2 cord.  She is not safe to return home and will require IP rehab at discharge.

## 2020-03-18 NOTE — PROGRESS NOTES
Continued Stay Note   [facility]     Patient Name: Dipti Perez  MRN: [id number]  Today's Date: 3/18/2020    Admit Date: 3/14/2020    Discharge Plan      Patient and daughter have decided not to go to Rehab now. Called daughter Dary who is POA and expressed concern from PT and OT therapists for patient safety as being a high fall risk. Daughter states she is with patient  24/7 and wants to taker her home with [facility]. Chose [facility]. States PCP is Dr. Yoshi Burris at [phone]. Referral made to Tammy.Order and information sent per Epic.    Discussed cost of Eliquis 2.5 mg BID with daughter. Called patient's pharmacy and cost is $88.99. Daughter does not think patient will pay. Wants to discuss with patient and MD. Patient's nurse informed.         Sissy Mckoy RN, CM  Office Phone [phone]  Cell [phone]

## 2020-03-18 NOTE — PROGRESS NOTES
PROGRESS NOTE      Patient Name: Dipti Perez  : 1940  MRN: [id number]  Primary Care Physician: Provider, No Known  Date of admission: 3/14/2020    Patient Care Team:  Provider, No Known as PCP - General        Subjective   Subjective:   Patient is feeling much better than before no new more alert than before complaints systems:  Review   All other review of system unremarkable      Allergies:    Allergies   Allergen Reactions   • Keflex [Cephalexin] Anaphylaxis       Objective   Exam:     Vital Signs  Temp:  [97.5 °F (36.4 °C)-99 °F (37.2 °C)] 97.9 °F (36.6 °C)  Heart Rate:  [] 106  Resp:  [14-27] 14  BP: ()/(65-79) 115/67  SpO2:  [92 %-100 %] 97 %  on  Flow (L/min):  [1-6] 1;   Device (Oxygen Therapy): room air  Body mass index is 19.45 kg/m².    General: Elderly white  female in no acute distress.    Head:      Normocephalic and atraumatic.    Eyes:      PERRL/EOM intact, conjunctiva and sclera clear with out nystagmus.    Neck:      No masses, thyromegaly,  trachea central with normal respiratory effort   Lungs:    Clear bilaterally to auscultation.    Heart:      Regular rate and rhythm, no murmur no gallop  Abd:        BS +pedro, soft nontender,no shiftg dullness   Pulses:   Pulses palpable  Extr:        No cyanosis or clubbing--no significant edema.    Neuro:    No focal deficits.   alert oriented x3  Skin:       Intact without lesions or rashes.    Psych:    Alert and cooperative; normal mood and affect; .      Results Review:  I have personally reviewed most recent Data :  CBC    Results from last 7 days   Lab Units 20  0353 20  0300 20  0500 03/15/20  0443 20  0437   WBC 10*3/mm3 8.20 7.70 10.10 9.80 14.50*   HEMOGLOBIN g/dL 11.1* 10.5* 11.6* 11.0* 10.6*   PLATELETS 10*3/mm3 281 271 224 230 215     CMP   Results from last 7 days   Lab Units 20  0352 20  0300 20  0500 03/15/20  0342 20  1852 20  0253   SODIUM mmol/L 143 146* 144  140 140 134*   POTASSIUM mmol/L 3.9 3.7 4.1 4.2 4.3 4.0   CHLORIDE mmol/L 99 99 101 98 95* 90*   CO2 mmol/L 34.0* 36.0* 32.0* 26.0 30.0* 29.0   BUN mg/dL 32* 41* 52* 61* 65* 66*   CREATININE mg/dL 0.90 1.05* 1.72* 2.92* 3.83* 5.54*   GLUCOSE mg/dL 104* 102* 101* 97 164* 84   ALBUMIN g/dL  --   --   --  3.10*  --  3.40*   BILIRUBIN mg/dL  --   --   --  0.5  --  0.7   ALK PHOS U/L  --   --   --  81  --  100   AST (SGOT) U/L  --   --   --  37*  --  43*   ALT (SGPT) U/L  --   --   --  13  --  14   LIPASE U/L  --   --   --   --   --  5*     ABG      Imaging Results (Last 24 Hours)     ** No results found for the last 24 hours. **          Results for orders placed during the hospital encounter of 03/14/20   Adult Transthoracic Echo Complete W/ Cont if Necessary Per Protocol    Narrative · Estimated EF = 60%.  · Left ventricular systolic function is normal.     Indications  Hypertension    Technically satisfactory study.  Mitral valve is thickened with adequate opening motion.  Mitral annular   calcification is present.  Tricuspid valve is structurally normal.  Aortic valve is thickened with adequate opening motion.  Pulmonic valve could not be well visualized.  No evidence for mitral tricuspid or aortic regurgitation is seen by   Doppler study.  Left atrium is normal in size.  Right atrium is normal in size.  Left ventricle is normal in size and contractility with ejection fraction   of 60%.  Right ventricle is normal in size.  Atrial septum is intact.  Aorta is normal.  No pericardial effusion or intracardiac thrombus is seen.    Impression  Thickened mitral and aortic valves with adequate opening motion.  Left ventricular size and contractility is normal with ejection fraction   of 60%  No pericardial effusion or intracardiac thrombus is seen.         Scheduled Meds:    clonazePAM 0.5 mg Oral Daily   clopidogrel 75 mg Oral Daily   enoxaparin 30 mg Subcutaneous Daily   gabapentin 100 mg Oral TID   ipratropium-albuterol  3 mL Nebulization 4x Daily - RT   metoprolol succinate XL 50 mg Oral Daily   predniSONE 5 mg Oral Daily   sodium chloride 10 mL Intravenous Q12H   traZODone 50 mg Oral Nightly   thiamine 100 mg Oral Daily     Continuous Infusions:   PRN Meds:•  acetaminophen **OR** acetaminophen **OR** acetaminophen  •  magnesium sulfate **OR** magnesium sulfate **OR** magnesium sulfate  •  ondansetron **OR** ondansetron  •  potassium & sodium phosphates **OR** potassium & sodium phosphates  •  potassium chloride  •  potassium chloride  •  sodium chloride    Assessment/Plan   Assessment and Plan:         Acute renal failure (ARF) (CMS/[facility])    Benign hypertensive heart disease    Cardiomyopathy, ischemic    Chronic obstructive pulmonary disease (CMS/[facility])    Coronary artery disease    Hypertension    ASSESSMENT:  · Acute kidney injury: Patient BNP level is high echocardiogram is still pending but with bicarb level of 29 normal electrolytes patient acute kidney injury seems to be related to volume depletion but BNP level is still elevated.  · Congestive heart failure ejection fraction unknown repeat echocardiogram pending  · Acute coronary syndrome  · History of some dementia  · History of hypertension        Plan:      · Renal functions have improved significantly  · No need for extra diuretics   · cardiogram result appreciated which is normal  · Cardiology consult appreciated   · at this time creatinine continue to improve with improvement in the urine output  · Etiology of acute increase in creatinine seem to be multifactorial including low blood pressure, decreased p.o. intake some volume depletion in the presence of ACE inhibitor's.  No evidence of nonsteroidal anti-inflammatory drugs or any contrast nephropathy at this time.  But renal functions are slowly improving for now  · Chest x-ray mainly consistent with COPD instead of volume overload that might be causing some increased shortness of breath    · Electrolytes and acid-base  is acceptable not consistent with any volume overload              Note started  by Zoran Do MD, 03/15/20, 10:17 AM.  [facility] kidney consultant

## 2020-03-18 NOTE — DISCHARGE SUMMARY
[facility] Medicine Services  DISCHARGE SUMMARY        Prepared For PCP:  Provider, No Known    Patient Name: Dipti Perez  : 1940  MRN: [id number]      Date of Admission:   3/14/2020    Date of Discharge:  3/18/2020    Length of stay:  LOS: 4 days     Hospital Course     Presenting Problem:   Acute renal failure (ARF) (CMS/HCC) [N17.9]      Active Hospital Problems    Diagnosis  POA   • New onset atrial fibrillation (CMS/HCC) [I48.91]  Yes     Priority: High   • Moderate protein-calorie malnutrition (CMS/HCC) [E44.0]  Yes     Priority: High   • Polypharmacy [Z79.899]  Not Applicable     Priority: High   • Hx of CABG [Z95.1]  Not Applicable     Priority: Medium   • Chronic obstructive pulmonary disease (CMS/HCC) [J44.9]  Yes     Priority: Medium   • Coronary artery disease [I25.10]  Yes     Priority: Medium   • Hypertension [I10]  Yes     Priority: Medium   • Benign hypertensive heart disease [I11.9]  Yes     Priority: Medium      Resolved Hospital Problems    Diagnosis Date Resolved POA   • **Acute renal failure (ARF) (CMS/HCC) [N17.9] 2020 Yes     Priority: High   • Acute metabolic encephalopathy [G93.41] 2020 Yes     Priority: High           Hospital Course:    The patient was admitted to the medical floor for treatment of acute metabolic encephalopathy, and STEPHEN.  Renal function improved with IV fluids.  Nephrology was consulted to assist.  The patient's Klonopin and gabapentin doses were decreased which improved mental status.  PT/ OT initially recommended inpatient rehabilitation but the patient refused and will be discharged home on 3/18/2020.  Cardiology evaluated the patient on 3/17/2020 for new onset atrial fibrillation.        List of problems during hospitalization:       New atrial fibrillation 3/17/20:  -Consulted cardiology  -TSH checked 3/14/2020  -TTE 3/14/20--> EF 60%  - pt only wants to take Aspirin for CVA prophylaxis     Acute renal failure  -  Improved with IVF  - Nephrology consulted     Altered mental status  -Suspect acute metabolic encephalopathy that is multifactorial  -s/p CT head without contrast 3/14/20--> CVA ruled out  --Decrease doses of Klonopin and gabapentin     Gram-negative bacteriuria  - Gram-negative bacilli in urine culture however colony count is less than 25,000 which could be colonization/contaminant, or a partially treated urine culture  - Follow-up on urine culture result  - No signs or symptoms of sepsis; doubt active infection is present  - s/p Rocephin in ED; currently not on any antibiotic     CAD s/p CABG:  -Managed by primary care  -Has not seen a cardiologist since her original surgery done 20 years ago by Dr. Rankin  -TTE 3/14/20--> EF 60%  - Continue metoprolol and Plavix     Hypertension  - Continue metoprolol  --lisinopril held because of STEPHEN     COPD  -- without exacerbation  - Continue duo nebs, prednisone     SUKUMAR:  - continue home CPAP at night     Chronic hypoxemic respiratory failure:  - Continue supplemental oxygen at 2 L via nasal cannula as she has at home     Chronic pain  - Continue gabapentin     Anxiety depression:  --On Klonopin, trazodone at home            Recommendation for Outpatient Providers:             Reasons For Change In Medications and Indications for New Medications:        Day of Discharge     HPI:     The patient is an 80-year-old female with past medical history of CAD s/p CABG, COPD, anxiety, depression, hyperlipidemia, hypertension, and dementia that was brought from an outlying hospital after family took her there for altered mental status.  The patient has about 1 month history of worsening confusion, lethargy, poor appetite and malaise.       Vital Signs:   Temp:  [97.5 °F (36.4 °C)-99 °F (37.2 °C)] 97.9 °F (36.6 °C)  Heart Rate:  [] 106  Resp:  [14-23] 23  BP: (115-134)/(65-79) 134/77     Physical Exam:  Physical Exam   Constitutional: She is oriented to person, place, and time.  She appears well-developed and well-nourished.   HENT:   Head: Normocephalic and atraumatic.   Eyes: Pupils are equal, round, and reactive to light. EOM are normal.   Neck: Normal range of motion. Neck supple.   Cardiovascular: Normal rate and normal heart sounds.   Pulmonary/Chest: Effort normal and breath sounds normal.   Abdominal: Soft. Bowel sounds are normal.   Musculoskeletal: Normal range of motion.   Neurological: She is alert and oriented to person, place, and time.   Skin: Skin is warm and dry.   Psychiatric: She has a normal mood and affect.       Pertinent  and/or Most Recent Results     Results from last 7 days   Lab Units 03/18/20  0353 03/18/20  0352 03/17/20  0300 03/16/20  0500 03/15/20  0443 03/15/20  0342 03/14/20  1852 03/14/20  0437 03/14/20  0253   WBC 10*3/mm3 8.20  --  7.70 10.10 9.80  --   --  14.50*  --    HEMOGLOBIN g/dL 11.1*  --  10.5* 11.6* 11.0*  --   --  10.6*  --    HEMATOCRIT % 33.3*  --  31.4* 34.5 33.2*  --   --  32.4*  --    PLATELETS 10*3/mm3 281  --  271 224 230  --   --  215  --    SODIUM mmol/L  --  143 146* 144  --  140 140  --  134*   POTASSIUM mmol/L  --  3.9 3.7 4.1  --  4.2 4.3  --  4.0   CHLORIDE mmol/L  --  99 99 101  --  98 95*  --  90*   CO2 mmol/L  --  34.0* 36.0* 32.0*  --  26.0 30.0*  --  29.0   BUN mg/dL  --  32* 41* 52*  --  61* 65*  --  66*   CREATININE mg/dL  --  0.90 1.05* 1.72*  --  2.92* 3.83*  --  5.54*   GLUCOSE mg/dL  --  104* 102* 101*  --  97 164*  --  84   CALCIUM mg/dL  --  9.3 9.0 8.7  --  8.6 8.1*  --  8.3*     Results from last 7 days   Lab Units 03/15/20  0342 03/14/20  0253   BILIRUBIN mg/dL 0.5 0.7   ALK PHOS U/L 81 100   ALT (SGPT) U/L 13 14   AST (SGOT) U/L 37* 43*     Results from last 7 days   Lab Units 03/14/20  0253   CHOLESTEROL mg/dL 175   TRIGLYCERIDES mg/dL 139   HDL CHOL mg/dL 53     Results from last 7 days   Lab Units 03/18/20  0352 03/17/20  0300 03/16/20  0500  03/14/20  0415 03/14/20  0253   TSH uIU/mL  --   --   --   --   --   0.185*   PROBNP pg/mL 1,088.0 1,464.0 2,286.0*   < >  --  3,535.0*   TROPONIN T ng/mL  --   --   --   --   --  0.052*   PROCALCITONIN ng/mL  --  0.14  --   --   --   --    LACTATE mmol/L  --   --   --   --  1.1  --     < > = values in this interval not displayed.       Brief Urine Lab Results  (Last result in the past 365 days)      Color   Clarity   Blood   Leuk Est   Nitrite   Protein   CREAT   Urine HCG        03/15/20 0251             83.4             Microbiology Results Abnormal     Procedure Component Value - Date/Time    Urine Culture - Urine, Urine, Clean Catch [[id number]]  (Abnormal) Collected:  03/14/20 0620    Lab Status:  Final result Specimen:  Urine, Clean Catch Updated:  03/16/20 0919     Urine Culture <10,000 CFU/mL Escherichia coli     Comment: Call if further workup needed.         Eosinophil Smear - Urine, Urine, Clean Catch [[id number]]  (Normal) Collected:  03/15/20 0251    Lab Status:  Final result Specimen:  Urine, Clean Catch Updated:  03/15/20 0426     Eosinophil Smear 0 % EOS/100 Cells           Ct Head Without Contrast    Result Date: 3/14/2020  Impression: 1. No acute intracranial abnormalities by CT examination. 2. Mild to moderate volume loss and chronic microvascular ischemic changes. Arteriosclerosis. 3. Remote infarcts within the cerebellum bilaterally. Electronically signed by:  Devante Irvin M.D.  3/14/2020 6:13 PM    Xr Chest 1 View    Result Date: 3/17/2020  Impression:  1. Emphysema. 2. New consolidation in the right midlung zone and left lung base suspicious for pneumonia.  Electronically Signed By-Harman Zimmer On:3/17/2020 8:19 AM This report was finalized on [id number] by  Harman Zimmer, .    Xr Chest 1 View    Result Date: 3/15/2020  Impression: No acute infiltrate is appreciated.  Electronically Signed By-Dr. Luis Rock MD On:3/15/2020 7:08 AM This report was finalized on [id number] by Dr. Luis Rock MD.    Us Renal Bilateral    Result Date:  3/14/2020  Impression:  1. The right and left kidneys show no evidence of stone or mass or obstruction. 2. No focal abnormality seen in the wall the moderately distended urinary bladder.  Electronically Signed By-DR. Jamie Sims MD On:3/14/2020 11:52 PM This report was finalized on [id number] by DR. Jamie Sims MD.                Results for orders placed during the hospital encounter of 03/14/20   Adult Transthoracic Echo Complete W/ Cont if Necessary Per Protocol    Narrative · Estimated EF = 60%.  · Left ventricular systolic function is normal.     Indications  Hypertension    Technically satisfactory study.  Mitral valve is thickened with adequate opening motion.  Mitral annular   calcification is present.  Tricuspid valve is structurally normal.  Aortic valve is thickened with adequate opening motion.  Pulmonic valve could not be well visualized.  No evidence for mitral tricuspid or aortic regurgitation is seen by   Doppler study.  Left atrium is normal in size.  Right atrium is normal in size.  Left ventricle is normal in size and contractility with ejection fraction   of 60%.  Right ventricle is normal in size.  Atrial septum is intact.  Aorta is normal.  No pericardial effusion or intracardiac thrombus is seen.    Impression  Thickened mitral and aortic valves with adequate opening motion.  Left ventricular size and contractility is normal with ejection fraction   of 60%  No pericardial effusion or intracardiac thrombus is seen.           Test Results Pending at Discharge        Procedures Performed: none           Consults:   Consults     Date and Time Order Name Status Description    3/17/2020 1433 Inpatient Cardiology Consult      3/14/2020 1333 Inpatient Nephrology Consult              Discharge Details        Discharge Medications      New Medications      Instructions Start Date   thiamine 100 MG tablet  Commonly known as:  VITAMIN B1   100 mg, Oral, Daily   Start Date:  March 19,  2020        Changes to Medications      Instructions Start Date   clonazePAM 0.5 MG tablet  Commonly known as:  KlonoPIN  What changed:    · when to take this  · reasons to take this   0.5 mg, Oral, Nightly PRN      gabapentin 100 MG capsule  Commonly known as:  NEURONTIN  What changed:    · medication strength  · how much to take   100 mg, Oral, 3 Times Daily         Continue These Medications      Instructions Start Date   cholecalciferol 25 MCG (1000 UT) tablet  Commonly known as:  VITAMIN D3   2,000 Units, Oral, Daily      clopidogrel 75 MG tablet  Commonly known as:  PLAVIX   75 mg, Oral, Daily      ipratropium-albuterol 0.5-2.5 mg/3 ml nebulizer  Commonly known as:  DUO-NEB   3 mL, Nebulization, 4 Times Daily - RT      metoprolol succinate XL 50 MG 24 hr tablet  Commonly known as:  TOPROL-XL   50 mg, Oral, Daily      polyethylene glycol packet  Commonly known as:  MIRALAX   17 g, Oral, 2 Times Daily      predniSONE 5 MG tablet  Commonly known as:  DELTASONE   5 mg, Oral, Daily      traMADol 50 MG tablet  Commonly known as:  ULTRAM   50 mg, Oral, 3 Times Daily PRN      traZODone 50 MG tablet  Commonly known as:  DESYREL   50 mg, Oral, Nightly         Stop These Medications    lisinopril 5 MG tablet  Commonly known as:  PRINIVIL,ZESTRIL            Allergies   Allergen Reactions   • Keflex [Cephalexin] Anaphylaxis         Discharge Disposition:  Home or Self Care    Diet:  Hospital:  Diet Order   Procedures   • Diet Regular, Texture; Mechanical Ground         Discharge Activity: As tolerated        CODE STATUS:    Code Status and Medical Interventions:   Ordered at: 03/14/20 0241     Code Status:    CPR     Medical Interventions (Level of Support Prior to Arrest):    Full         Follow-up Appointments  No future appointments.    Additional Instructions for the Follow-ups that You Need to Schedule     Ambulatory Referral to Home Health   As directed      Face to Face Visit Date:  3/18/2020    Follow-up provider  for Plan of Care?:  I treated the patient in an acute care facility and will not continue treatment after discharge.    Follow-up provider:  RADHAMES LANCE [[id number]]    Medical necessity for home care:  Acute Renal Failure    Describe mobility, cognitive and/or behavioral limitations that make leaving home difficult:  Confusion    Individual has a medical condition for which leaving home is medically contraindicated:  Yes    Describe condition:  Confusion    Patient uses assistive devices:  Yes    Devices used:  walker    Nursing/Therapeutic Services Requested:  Physical Therapy    PT orders:  Other (add comment) (Eval and Treat )    Frequency:  1 Week 1         Discharge Follow-up with PCP   As directed       Currently Documented PCP:    Provider, No Known    PCP Phone Number:    None     Follow Up Details:  2 weeks             Condition on Discharge:      Stable    Electronically signed by Mian Car DO, 03/18/20, 3:59 PM.    Time: I spent  20 minutes on this discharge activity which included face-to-face encounter with the patient/reviewing the data in the system/coordination of the care with the nursing staff as well as consultants/documentation/entering orders.

## 2020-03-18 NOTE — THERAPY TREATMENT NOTE
Acute Care - Occupational Therapy Treatment Note   [place]     Patient Name: Dipti Perez  : 1940  MRN: [id number]  Today's Date: 3/18/2020             Admit Date: 3/14/2020       ICD-10-CM ICD-9-CM   1. Anxiety disorder, unspecified type F41.9 300.00   2. Acute renal failure, unspecified acute renal failure type (CMS/HCC) N17.9 584.9     Patient Active Problem List   Diagnosis   • Benign hypertensive heart disease   • Cardiomyopathy, ischemic   • Chronic obstructive pulmonary disease (CMS/HCC)   • Coronary artery disease   • Hypertension   • New onset atrial fibrillation (CMS/HCC)   • Moderate protein-calorie malnutrition (CMS/HCC)   • Polypharmacy   • Hx of CABG     Past Medical History:   Diagnosis Date   • CAD (coronary artery disease)    • CHF (congestive heart failure) (CMS/HCC)    • COPD (chronic obstructive pulmonary disease) (CMS/HCC)    • Dementia (CMS/HCC)    • Elevated cholesterol    • Hyperlipidemia    • Hypertension      Past Surgical History:   Procedure Laterality Date   • CARDIAC SURGERY     • CORONARY ARTERY BYPASS GRAFT     • SPINE SURGERY         Therapy Treatment    Rehabilitation Treatment Summary     Row Name 20             Treatment Time/Intention    Discipline  occupational therapist  (Pended)   -SR      Document Type  therapy note (daily note)  (Pended)   -SR      Subjective Information  complains of;fatigue  (Pended)   -SR      Recorded by [SR] Marion Chopra OT 20      Row Name 20             Bed Mobility Assessment/Treatment    Bed Mobility Assessment/Treatment  supine-sit  (Pended)   -SR      Supine-Sit [place] (Bed Mobility)  minimum assist (75% patient effort)  (Pended)   -SR      Recorded by [SR] Marion Chopra OT 20      Row Name 20             Functional Mobility    Functional Mobility- Ind. Level  minimum assist (75% patient effort)  (Pended)   -SR      Functional Mobility- Device  rolling  walker  (Pended)   -SR      Functional Mobility- Comment  Max cues for posture and proper use of rwx.    (Pended)   -SR      Recorded by [SR] Marion Chopra OT 03/18/20 1627      Row Name 03/18/20 0924             Transfer Assessment/Treatment    Transfer Assessment/Treatment  sit-stand transfer  (Pended)   -SR      Recorded by [SR] Marion Chopra, OT 03/18/20 1627      Row Name 03/18/20 0924             Sit-Stand Transfer    Sit-Stand Teller (Transfers)  minimum assist (75% patient effort)  (Pended)   -SR      Assistive Device (Sit-Stand Transfers)  walker, front-wheeled  (Pended)   -SR      Recorded by [SR] Marion Chopra OT 03/18/20 1627      Row Name 03/18/20 0924             ADL Assessment/Intervention    BADL Assessment/Intervention  lower body dressing;toileting;grooming  (Pended)   -SR      Recorded by [SR] Marion Chopra OT 03/18/20 1627      Row Name 03/18/20 1600             Lower Body Dressing Assessment/Training    Lower Body Dressing Teller Level  don;socks;minimum assist (75% patient effort)  -SR      Recorded by [SR] Marion Chopra, OT 03/18/20 1635      Row Name 03/18/20 1600             Grooming Assessment/Training    Teller Level (Grooming)  wash face, hands;verbal cues;contact guard assist  -SR      Comment (Grooming)  Pt requires cues for each step.   -SR      Recorded by [SR] Marion Chopra OT 03/18/20 1635      Row Name 03/18/20 1600             BADL Safety/Performance    Skilled BADL Treatment/Intervention  cognitive/safety deficit modifications  -SR      Recorded by [SR] Marion Chopra, OT 03/18/20 1635      Row Name 03/18/20 1600             Static Sitting Balance    Level of Teller (Unsupported Sitting, Static Balance)  supervision  -SR      Recorded by [SR] Marion Chopra, OT 03/18/20 1635      Row Name 03/18/20 1600             Dynamic Sitting Balance    Level of Teller, Reaches Outside  Midline (Sitting, Dynamic Balance)  contact guard assist  -SR      Recorded by [SR] Marion Chopra OT 03/18/20 1635      Row Name 03/18/20 1600             Static Standing Balance    Level of [place] (Supported Standing, Static Balance)  minimal assist, 75% patient effort  -SR      Recorded by [SR] Marion Chopra OT 03/18/20 1635      Row Name 03/18/20 1600             Dynamic Standing Balance    Level of [place], Reaches Outside Midline (Standing, Dynamic Balance)  minimal assist, 75% patient effort  -SR      Time Able to Maintain Position, Reaches Outside Midline (Standing, Dynamic Balance)  2 to 3 minutes  -SR      Assistive Device Utilized (Supported Standing, Dynamic Balance)  walker, rolling  -SR      Recorded by [SR] Marion Chopra OT 03/18/20 1635      Row Name 03/18/20 1600             Positioning and Restraints    Pre-Treatment Position  in bed  -SR      Post Treatment Position  chair  -SR      In Chair  call light within reach;exit alarm on;encouraged to call for assist  -SR      Recorded by [SR] Marion Chopra, OT 03/18/20 1635      Row Name 03/18/20 1600             Pain Scale: FACES Pre/Post-Treatment    Pain: FACES Scale, Pretreatment  2-->hurts little bit back pain   -SR      Recorded by [SR] Marion Chopra OT 03/18/20 1635      Row Name 03/18/20 1600             Plan of Care Review    Outcome Summary  Pt demostrates minor improvement in strength, though very impulsive and requires cues for use of rwx.  She is unsteady and requires assist for balance on multiple occasions.  She is unaware of lines on often wraps herself in O2 cord.  She is not safe to return home and will require IP rehab at discharge.    -SR      Recorded by [SR] Marion Chopra OT 03/18/20 1635      Row Name 03/18/20 1600             Outcome Summary/Treatment Plan (OT)    Anticipated Discharge Disposition (OT)  inpatient rehabilitation facility  -SR      Recorded by [SR]  Marion Chopra, OT 03/18/20 0990        User Key  (r) = Recorded By, (t) = Taken By, (c) = Cosigned By    Initials Name Effective Dates Discipline    SR Marion Chopra, OT 03/01/19 -  OT           Rehab Goal Summary     Row Name 03/18/20 1500             Swallow Goals (SLP)    Oral Nutrition/Hydration Goal Selection (SLP)  oral nutrition/hydration, SLP goal 1;oral nutrition/hydration, SLP goal 2;oral nutrition/hydration, SLP goal (free text)  -SM         Oral Nutrition/Hydration Goal 1 (SLP)    Oral Nutrition/Hydration Goal 1, SLP  Pt will participate in full meal assessment to assess diet tolerance and make further recommendations as indicated  -SM      Time Frame (Oral Nutrition/Hydration Goal 1, SLP)  1 day;2 days  -SM      Barriers (Oral Nutrition/Hydration Goal 1, SLP)  Pt assessed today sitting up on the side of her bed in her room during established noon meal. Pt had mech soft tray present consisting of thin tea and lemonade, pureed (pudding and blended soup) and mech soft items (green beans, chicken salad and peaches). Pt agreeable to trials of thin lemonade and tea, had already eaten all of pudding and did take entire cup of peaches. Pt did not want to try soup, chicken salad and green beans. Labial seal is adequate. No anterior spillage noted. Pt with only few natural dentition. Slow but functional mastication of peaches noted. Pt is able to clear oral cavity effectively. No overt s/s of difficulty noted.   -SM      Progress/Outcomes (Oral Nutrition/Hydration Goal 1, SLP)  continuing progress toward goal;good progress toward goal  -SM         Oral Nutrition/Hydration Goal 2 (SLP)    Oral Nutrition/Hydration Goal 2, SLP  PT/caregiver teaching with additional goals as indicated  -SM      Time Frame (Oral Nutrition/Hydration Goal 2, SLP)  by discharge  -SM      Barriers (Oral Nutrition/Hydration Goal 2, SLP)  Much teaching conducted with pt. No family present. Awaiting plan for discharge.    -SM      Progress/Outcomes (Oral Nutrition/Hydration Goal 2, SLP)  continuing progress toward goal;goal ongoing  -SM        User Key  (r) = Recorded By, (t) = Taken By, (c) = Cosigned By    Initials Name Provider Type Discipline    Cyn Trevino, SLP Speech and Language Pathologist SLP            OT Recommendation and Plan  Outcome Summary/Treatment Plan (OT)  Anticipated Discharge Disposition (OT): inpatient rehabilitation facility  Therapy Frequency (OT Eval): 3 times/wk  Outcome Summary: Pt demostrates minor improvement in strength, though very impulsive and requires cues for use of rwx.  She is unsteady and requires assist for balance on multiple occasions.  She is unaware of lines on often wraps herself in O2 cord.  She is not safe to return home and will require IP rehab at discharge.       Time Calculation:   Time Calculation- OT     Row Name 03/18/20 1637 03/18/20 1636          Time Calculation- OT    OT Start Time  --  0924  -SR     OT Stop Time  --  0949  -SR     OT Time Calculation (min)  --  25 min  -SR     Total Timed Code Minutes- OT  --  25 minute(s)  -SR     OT Received On  03/18/20  -SR  --     OT - Next Appointment  03/19/20  -SR  --       User Key  (r) = Recorded By, (t) = Taken By, (c) = Cosigned By    Initials Name Provider Type    SR Marion Chopra OT Occupational Therapist        Therapy Charges for Today     Code Description Service Date Service Provider Modifiers Qty    81451243301 HC OT SELF CARE/MGMT/TRAIN EA 15 MIN 3/18/2020 Marion Chopra OT GO 2               Marion Chopra OT  3/18/2020

## 2020-03-18 NOTE — PROGRESS NOTES
Cardiology Progress  Note      Patient Care Team:  Provider, No Known as PCP - General      PATIENT IDENTIFICATION    Name: Dipti Perez Age: 80 y.o. Sex: female :  1940 MRN: [id number]     Cardiology assessment and plan    Atrial fibrillation newly diagnosed  History of coronary artery disease prior coronary artery bypass surgery  COPD  Hypertension  Hyperlipidemia  Dementia  Frail status  Acute kidney injury  Elevated proBNP  Echocardiogram with normal LV systolic function       Plan    Patient would benefit from anticoagulation if no contraindication due to elevated chads score.  Discussed with patient and she would like to talk with her daughter.  If patient and family is agreeable and if there is no contraindication for anticoagulation consider low-dose Eliquis 2.5 mg p.o. twice daily for stroke prophylaxis  If patient and family does not want to take anticoagulation or if there is a clinical concern for significant bleeding risk consider continuing Plavix  Continue beta-blocker, Plavix  Blood pressure is very stable  Patient is reporting no symptoms of angina or anginal equivalent      Impression/echocardiogram  Thickened mitral and aortic valves with adequate opening motion.  Left ventricular size and contractility is normal with ejection fraction of 60%  No pericardial effusion or intracardiac thrombus is seen.         REASON FOR FOLLOW-UP:  80 y.o. female with past medical history of coronary artery disease status post prior CABG, heart failure with preserved ejection fraction, cardiomyopathy, CABG, COPD, hyperlipidemia, hypertension, and dementia.      Atrial fibrillation-new problem    SUBJECTIVE    Patient denies any complaints of pain or shortness of breath  No palpitations reported      REVIEW OF SYSTEMS:  Pertinent items are noted in HPI, all other systems reviewed and negative    OBJECTIVE   Patient lying supine in bed sleeping upon entry.  Monitor shows sinus rhythm with  "frequent PACs and occasional PVCs  Normal respiratory effort  TSH low at 0.185    ASSESSMENT/PLAN    Acute renal failure (ARF) (CMS/HCC)    Benign hypertensive heart disease    Cardiomyopathy, ischemic    Chronic obstructive pulmonary disease (CMS/HCC)    Coronary artery disease    Hypertension              Vital Signs  Visit Vitals  /79 (BP Location: Right arm, Patient Position: Lying)   Pulse 88   Temp 97.8 °F (36.6 °C) (Axillary)   Resp 18   Ht 160 cm (63\")   Wt 49.8 kg (109 lb 12.6 oz)   SpO2 99%   BMI 19.45 kg/m²     Oxygen Therapy  SpO2: 99 %  Pulse Oximetry Type: Continuous  Device (Oxygen Therapy): room air(took pt off of o2 after tx and left on room air per sats)  Flow (L/min): 2  Oxygen Concentration (%): 21  Flowsheet Rows      First Filed Value   Admission Height  160 cm (63\") Documented at 03/14/2020 0500   Admission Weight  54.5 kg (120 lb 2.4 oz) Documented at 03/14/2020 0227        Intake & Output (last 3 days)       03/15 0701 - 03/16 0700 03/16 0701 - 03/17 0700 03/17 0701 - 03/18 0700 03/18 0701 - 03/19 0700    P.O. 580 120 720 480    I.V. (mL/kg)        Total Intake(mL/kg) 580 (11) 120 (2.4) 720 (14.5) 480 (9.6)    Urine (mL/kg/hr)        Stool        Total Output        Net +580 +120 +720 +480            Urine Unmeasured Occurrence  1 x 2 x     Stool Unmeasured Occurrence 4 x  3 x         Lines, Drains & Airways    Active LDAs     Name:   Placement date:   Placement time:   Site:   Days:    Peripheral IV 03/18/20 0415 Left Antecubital   03/18/20 0415    Antecubital   less than 1                       /79 (BP Location: Right arm, Patient Position: Lying)   Pulse 88   Temp 97.8 °F (36.6 °C) (Axillary)   Resp 18   Ht 160 cm (63\")   Wt 49.8 kg (109 lb 12.6 oz)   SpO2 99%   BMI 19.45 kg/m²   Intake/Output last 3 shifts:  I/O last 3 completed shifts:  In: 720 [P.O.:720]  Out: -   Intake/Output this shift:  I/O this shift:  In: 480 [P.O.:480]  Out: -     PHYSICAL " EXAM:    General: Well-developed, thin and frail-appearing 80-year-old  female who is alert, cooperative, no distress, appears stated age  Head:  Normocephalic, atraumatic, mucous membranes moist.  Missing teeth  Eyes:  Conjunctiva/corneas clear, EOM's intact     Neck:  Supple,  no adenopathy;      Lungs: Clear to auscultation bilaterally, no wheezes rhonchi rales are noted  Chest wall: No tenderness  Heart::  irregular rate and irregular rhythm, S1 and S2 normal, no murmur, rub or gallop  Abdomen: Soft, non-tender, nondistended bowel sounds active  Extremities: No cyanosis, clubbing, or edema   Pulses: 2+ and symmetric all extremities  Skin:  No rashes or lesions  Neuro/psych: A&O x3. CN II through XII are grossly intact with appropriate affect      Scheduled Meds:        clonazePAM 0.5 mg Oral Daily   clopidogrel 75 mg Oral Daily   enoxaparin 30 mg Subcutaneous Daily   gabapentin 100 mg Oral TID   ipratropium-albuterol 3 mL Nebulization 4x Daily - RT   metoprolol succinate XL 50 mg Oral Daily   predniSONE 5 mg Oral Daily   sodium chloride 10 mL Intravenous Q12H   traZODone 50 mg Oral Nightly   thiamine 100 mg Oral Daily       Continuous Infusions:         PRN Meds:    •  acetaminophen **OR** acetaminophen **OR** acetaminophen  •  magnesium sulfate **OR** magnesium sulfate **OR** magnesium sulfate  •  ondansetron **OR** ondansetron  •  potassium & sodium phosphates **OR** potassium & sodium phosphates  •  potassium chloride  •  potassium chloride  •  sodium chloride        Results Review:     I reviewed the patient's new clinical results.    CBC    Results from last 7 days   Lab Units 03/18/20  0353 03/17/20  0300 03/16/20  0500 03/15/20  0443 03/14/20  0437   WBC 10*3/mm3 8.20 7.70 10.10 9.80 14.50*   HEMOGLOBIN g/dL 11.1* 10.5* 11.6* 11.0* 10.6*   PLATELETS 10*3/mm3 281 271 224 230 215     Cr Clearance Estimated Creatinine Clearance: 39.2 mL/min (by C-G formula based on SCr of 0.9 mg/dL).  Coag     HbA1C  No results found for: HGBA1C  Blood Glucose   Glucose   Date/Time Value Ref Range Status   03/16/2020 1922 123 (H) 70 - 105 mg/dL Final     Comment:     Serial Number: [id number]:  [id number]   03/15/2020 1651 128 (H) 70 - 105 mg/dL Final     Comment:     Serial Number: [id number]:  [id number]     Infection Results from last 7 days   Lab Units 03/17/20  0300 03/14/20  0620   URINECX   --  <10,000 CFU/mL Escherichia coli*   PROCALCITONIN ng/mL 0.14  --      CMP Results from last 7 days   Lab Units 03/18/20  0352 03/17/20  0300 03/16/20  0500 03/15/20  0342 03/14/20  1852 03/14/20  0253   SODIUM mmol/L 143 146* 144 140 140 134*   POTASSIUM mmol/L 3.9 3.7 4.1 4.2 4.3 4.0   CHLORIDE mmol/L 99 99 101 98 95* 90*   CO2 mmol/L 34.0* 36.0* 32.0* 26.0 30.0* 29.0   BUN mg/dL 32* 41* 52* 61* 65* 66*   CREATININE mg/dL 0.90 1.05* 1.72* 2.92* 3.83* 5.54*   GLUCOSE mg/dL 104* 102* 101* 97 164* 84   ALBUMIN g/dL  --   --   --  3.10*  --  3.40*   BILIRUBIN mg/dL  --   --   --  0.5  --  0.7   ALK PHOS U/L  --   --   --  81  --  100   AST (SGOT) U/L  --   --   --  37*  --  43*   ALT (SGPT) U/L  --   --   --  13  --  14   LIPASE U/L  --   --   --   --   --  5*     ABG      UA  Results from last 7 days   Lab Units 03/14/20  0620   NITRITE UA  Negative   WBC UA /HPF 6-12*   BACTERIA UA /HPF Trace*   SQUAM EPITHEL UA /HPF 0-2   URINECX  <10,000 CFU/mL Escherichia coli*     LEFTY  No results found for: POCMETH, POCAMPHET, POCBARBITUR, POCBENZO, POCCOCAINE, POCOPIATES, POCOXYCODO, POCPHENCYC, POCPROPOXY, POCTHC, POCTRICYC  Lysis Labs Results from last 7 days   Lab Units 03/18/20  0353 03/18/20  0352 03/17/20  0300 03/16/20  0500 03/15/20  0443 03/15/20  0342 03/14/20  1852 03/14/20  0437 03/14/20  0253   HEMOGLOBIN g/dL 11.1*  --  10.5* 11.6* 11.0*  --   --  10.6*  --    PLATELETS 10*3/mm3 281  --  271 224 230  --   --  215  --    CREATININE mg/dL  --  0.90 1.05* 1.72*  --  2.92* 3.83*  --  5.54*     Radiology(recent) Xr Chest 1  View    Result Date: 3/17/2020   1. Emphysema. 2. New consolidation in the right midlung zone and left lung base suspicious for pneumonia.  Electronically Signed By-Harman Zimmer On:3/17/2020 8:19 AM This report was finalized on [id number] by  Harman Zimmer, .        Results from last 7 days   Lab Units 03/14/20  0253   TROPONIN T ng/mL 0.052*       Xrays, labs reviewed personally by physician.    ECG/EMG Results (most recent)     Procedure Component Value Units Date/Time    Adult Transthoracic Echo Complete W/ Cont if Necessary Per Protocol [[id number]] Collected:  03/15/20 1155     Updated:  03/15/20 1716     BSA 1.5 m^2      RVIDd 2.7 cm      IVSd 0.84 cm      LVIDd 2.7 cm      LVIDs 2.0 cm      LVPWd 0.98 cm      IVS/LVPW 0.86     FS 25.5 %      EDV(Teich) 26.2 ml      ESV(Teich) 12.5 ml      EF(Teich) 52.3 %      EDV(cubed) 19.0 ml      ESV(cubed) 7.8 ml      EF(cubed) 58.7 %      LV mass(C)d 60.1 grams      LV mass(C)dI 39.3 grams/m^2      SV(Teich) 13.7 ml      SI(Teich) 9.0 ml/m^2      SV(cubed) 11.2 ml      SI(cubed) 7.3 ml/m^2      Ao root diam 2.6 cm      Ao root area 5.3 cm^2      ACS 1.8 cm      LVOT diam 1.7 cm      LVOT area 2.4 cm^2      EDV(MOD-sp4) 29.6 ml      ESV(MOD-sp4) 18.1 ml      EF(MOD-sp4) 39.0 %      SV(MOD-sp4) 11.5 ml      SI(MOD-sp4) 7.5 ml/m^2      Ao root area (BSA corrected) 1.7     LV Calderon Vol (BSA corrected) 19.4 ml/m^2      LV Sys Vol (BSA corrected) 11.8 ml/m^2      MV E max shaye 95.5 cm/sec      MV A max shaye 97.1 cm/sec      MV E/A 0.98     MV V2 max 108.9 cm/sec      MV max PG 4.7 mmHg      MV V2 mean 76.3 cm/sec      MV mean PG 2.5 mmHg      MV V2 VTI 26.4 cm      MVA(VTI) 2.6 cm^2      MV dec slope 344.7 cm/sec^2      MV dec time 0.28 sec      Ao pk shaye 150.8 cm/sec      Ao max PG 9.1 mmHg      Ao max PG (full) 3.7 mmHg      Ao V2 mean 121.5 cm/sec      Ao mean PG 6.3 mmHg      Ao mean PG (full) 2.8 mmHg      Ao V2 VTI 39.3 cm      ROXANE(I,A) 1.8 cm^2      ROXANE(I,D) 1.8 cm^2       ROXANE(V,A) 1.8 cm^2      ROXANE(V,D) 1.8 cm^2      LV V1 max PG 5.4 mmHg      LV V1 mean PG 3.5 mmHg      LV V1 max 116.3 cm/sec      LV V1 mean 90.7 cm/sec      LV V1 VTI 29.3 cm      SV(Ao) 207.4 ml      SI(Ao) 135.7 ml/m^2      SV(LVOT) 69.2 ml      SI(LVOT) 45.2 ml/m^2      PA V2 max 101.4 cm/sec      PA max PG 4.1 mmHg      PA max PG (full) 2.4 mmHg      RV V1 max PG 1.8 mmHg      RV V1 mean PG 0.98 mmHg      RV V1 max 66.2 cm/sec      RV V1 mean 46.1 cm/sec      RV V1 VTI 17.4 cm       CV ECHO ABDELRAHMAN - BZI_BMI 20.4 kilograms/m^2       CV ECHO ABDELRAHMAN - BSA(HAYCOCK) 1.5 m^2       CV ECHO ABDELRAHMAN - BZI_METRIC_WEIGHT 52.2 kg       CV ECHO ABDELRAHMAN - BZI_METRIC_HEIGHT 160.0 cm      EF(MOD-bp) 39.0 %      LA dimension(2D) 2.6 cm      Echo EF Estimated 60 %     Narrative:         · Estimated EF = 60%.  · Left ventricular systolic function is normal.     Indications  Hypertension    Technically satisfactory study.  Mitral valve is thickened with adequate opening motion.  Mitral annular   calcification is present.  Tricuspid valve is structurally normal.  Aortic valve is thickened with adequate opening motion.  Pulmonic valve could not be well visualized.  No evidence for mitral tricuspid or aortic regurgitation is seen by   Doppler study.  Left atrium is normal in size.  Right atrium is normal in size.  Left ventricle is normal in size and contractility with ejection fraction   of 60%.  Right ventricle is normal in size.  Atrial septum is intact.  Aorta is normal.  No pericardial effusion or intracardiac thrombus is seen.    Impression  Thickened mitral and aortic valves with adequate opening motion.  Left ventricular size and contractility is normal with ejection fraction   of 60%  No pericardial effusion or intracardiac thrombus is seen.        ECG 12 Lead [[id number]] Collected:  03/17/20 1249     Updated:  03/18/20 0934    Narrative:       HEART RATE= 116  bpm  RR Interval= 517  ms  WV Interval=   ms  P Horizontal Axis=    deg  P Front Axis=   deg  QRSD Interval= 92  ms  QT Interval= 322  ms  QRS Axis= 45  deg  T Wave Axis= 48  deg  - ABNORMAL ECG -  Atrial fibrillation  Anteroseptal infarct, age indeterminate  No previous ECG available for comparison  Electronically Signed By: Fawad Lopez ([facility]) 18-Mar-2020 09:34:21  Date and Time of Study: 2020-03-17 12:49:57            Medication Review:   I have reviewed the patient's current medication list  Scheduled Meds:  clonazePAM 0.5 mg Oral Daily   clopidogrel 75 mg Oral Daily   enoxaparin 30 mg Subcutaneous Daily   gabapentin 100 mg Oral TID   ipratropium-albuterol 3 mL Nebulization 4x Daily - RT   metoprolol succinate XL 50 mg Oral Daily   predniSONE 5 mg Oral Daily   sodium chloride 10 mL Intravenous Q12H   traZODone 50 mg Oral Nightly   thiamine 100 mg Oral Daily     Continuous Infusions:   PRN Meds:.•  acetaminophen **OR** acetaminophen **OR** acetaminophen  •  magnesium sulfate **OR** magnesium sulfate **OR** magnesium sulfate  •  ondansetron **OR** ondansetron  •  potassium & sodium phosphates **OR** potassium & sodium phosphates  •  potassium chloride  •  potassium chloride  •  sodium chloride    Imaging:  Imaging Results (Last 72 Hours)     Procedure Component Value Units Date/Time    XR Chest 1 View [[id number]] Collected:  03/17/20 0818     Updated:  03/17/20 0821    Narrative:       DATE OF EXAM:  3/17/2020 8:12 AM     PROCEDURE:  XR CHEST 1 VW-     INDICATIONS:  Fatigue, COPD, cough, coronary artery disease, hypertension.      COMPARISON:  3/14/2020.     TECHNIQUE:   Single radiographic view of the chest was obtained.     FINDINGS:  The heart size is normal. The patient's had a previous median  sternotomy. The patient also has underlying emphysema. The pulmonary  vascular markings are normal. There are new areas of patchy  consolidation in the right midlung zone and left lung base suspicious  for pneumonia. There is no pleural effusion or pneumothorax.  There  are  chronic age-related changes involving the bony thorax and thoracic  aorta.       Impression:          1. Emphysema.  2. New consolidation in the right midlung zone and left lung base  suspicious for pneumonia.     Electronically Signed By-Harman Zimmer On:3/17/2020 8:19 AM  This report was finalized on [id number] by  Harman Zimmer, .    XR Outside Films [[id number]] Resulted:  03/16/20 0644     Updated:  03/16/20 0644    Narrative:       This procedure was auto-finalized with no dictation required.

## 2020-03-18 NOTE — CONSULTS
Referring Provider: Dr. Wei  Reason for Consultation: Atrial fibrillation    Cardiology assessment and plan    Atrial fibrillation newly diagnosed  History of coronary artery disease prior coronary artery bypass surgery  COPD  Hypertension  Hyperlipidemia  Dementia  Frail status  Acute kidney injury  Elevated proBNP  Echocardiogram with normal LV systolic function    Atrial fibrillation is rate controlled  Continue current medical management  We will discuss with the patient and family about long-term anticoagulation options  Likely patient is a high risk for anticoagulation therapy        Chief complaint shortness of breath and mental status changes    Subjective .  No new complaints    History of present illness:  Dipti Perez is a 80 y.o. female who presents with past medical history of coronary artery disease, CHF, cardiomyopathy, CABG, COPD, hyperlipidemia, hypertension, and dementia who was brought from an outlying hospital after family took her there for altered mental status.  Patient is of poor historian and can give very little details.  Family at the bedside state that she had been declining gradually over the past month with increased confusion, lethargy, poor appetite, and malaise.  They will deny any fevers, chills, flulike symptoms or other recent illness.  She denies any chest pain, shortness of air, nausea, vomiting, diarrhea.    Patient denies any chest discomfort  Shortness of breath at baseline      Review of Systems  Review of Systems   Constitution: Negative for chills, decreased appetite and malaise/fatigue.   HENT: Negative for congestion.    Eyes: Negative for blurred vision and double vision.   Cardiovascular: Positive for dyspnea on exertion and irregular heartbeat. Negative for chest pain, leg swelling, near-syncope, orthopnea, palpitations, paroxysmal nocturnal dyspnea and syncope.   Respiratory: Positive for cough and shortness of breath.    Hematologic/Lymphatic:  Negative for adenopathy. Does not bruise/bleed easily.   Skin: Negative for rash.   Gastrointestinal: Negative for bloating, abdominal pain, hematemesis and hematochezia.   Genitourinary: Negative for frequency and hematuria.   Neurological: Negative for dizziness and focal weakness.   Psychiatric/Behavioral: Negative for altered mental status and hallucinations.       Past Medical History  Past Medical History:   Diagnosis Date   • CAD (coronary artery disease)    • CHF (congestive heart failure) (CMS/[facility])    • COPD (chronic obstructive pulmonary disease) (CMS/[facility])    • Dementia (CMS/[facility])    • Elevated cholesterol    • Hyperlipidemia    • Hypertension     and Past Surgical History:   Procedure Laterality Date   • CARDIAC SURGERY     • CORONARY ARTERY BYPASS GRAFT     • SPINE SURGERY         Family History  Family History   Family history unknown: Yes       Social History  Social History     Socioeconomic History   • Marital status:      Spouse name: Not on file   • Number of children: Not on file   • Years of education: Not on file   • Highest education level: Not on file   Tobacco Use   • Smoking status: Former Smoker     Packs/day: 1.00     Years: 10.00     Pack years: 10.00   • Smokeless tobacco: Never Used   Substance and Sexual Activity   • Alcohol use: Never     Frequency: Never   • Drug use: Never   • Sexual activity: Defer       Objective     Physical Exam:  Physical Exam   Constitutional: She is oriented to person, place, and time. She appears cachectic.   HENT:   Head: Normocephalic and atraumatic.   Eyes: Pupils are equal, round, and reactive to light. Conjunctivae are normal.   Neck: Normal range of motion. Neck supple. No thyromegaly present.   Cardiovascular: Normal rate, S1 normal, S2 normal and intact distal pulses. An irregular rhythm present. PMI is displaced.   Murmur heard.   Early systolic murmur is present with a grade of 2/6.  Pulmonary/Chest: Effort normal and breath sounds normal.    "  Abdominal: Soft. Bowel sounds are normal.   Musculoskeletal: She exhibits no edema.   Neurological: She is alert and oriented to person, place, and time.   Skin: Skin is warm.   Nursing note and vitals reviewed.      Vital Signs  Vitals:    03/17/20 1812 03/17/20 1900 03/17/20 1904 03/17/20 1909   BP: 123/73      BP Location: Right arm      Patient Position: Lying      Pulse: 112 98 97 103   Resp: 17 18 18   Temp: 98.9 °F (37.2 °C)      TempSrc: Oral      SpO2: 92% 94% 96% 100%   Weight:       Height:           Weight  Flowsheet Rows      First Filed Value   Admission Height  160 cm (63\") Documented at 03/14/2020 0500   Admission Weight  54.5 kg (120 lb 2.4 oz) Documented at 03/14/2020 0227              Results Review:  Lab Results (last 24 hours)     Procedure Component Value Units Date/Time    Vitamin B12 [[id number]]  (Normal) Collected:  03/17/20 0300    Specimen:  Blood Updated:  03/17/20 1143     Vitamin B-12 377 pg/mL     Narrative:       Results may be falsely increased if patient taking Biotin.      Sedimentation Rate [[id number]]  (Abnormal) Collected:  03/17/20 0300    Specimen:  Blood Updated:  03/17/20 0507     Sed Rate 48 mm/hr     Phosphorus [[id number]]  (Abnormal) Collected:  03/17/20 0300    Specimen:  Blood Updated:  03/17/20 0450     Phosphorus 2.4 mg/dL     Basic Metabolic Panel [[id number]]  (Abnormal) Collected:  03/17/20 0300    Specimen:  Blood Updated:  03/17/20 0441     Glucose 102 mg/dL      BUN 41 mg/dL      Creatinine 1.05 mg/dL      Sodium 146 mmol/L      Potassium 3.7 mmol/L      Chloride 99 mmol/L      CO2 36.0 mmol/L      Calcium 9.0 mg/dL      eGFR Non African Amer 50 mL/min/1.73      BUN/Creatinine Ratio 39.0     Anion Gap 11.0 mmol/L     Narrative:       GFR Normal >60  Chronic Kidney Disease <60  Kidney Failure <15      C-reactive Protein [[id number]]  (Abnormal) Collected:  03/17/20 0300    Specimen:  Blood Updated:  03/17/20 0441     C-Reactive Protein 7.20 mg/dL     " "Procalcitonin [[id number]]  (Normal) Collected:  03/17/20 0300    Specimen:  Blood Updated:  03/17/20 0438     Procalcitonin 0.14 ng/mL     Narrative:       As a Marker for Sepsis (Non-Neonates):   1. <0.5 ng/mL represents a low risk of severe sepsis and/or septic shock.  1. >2 ng/mL represents a high risk of severe sepsis and/or septic shock.    As a Marker for Lower Respiratory Tract Infections that require antibiotic therapy:  PCT on Admission     Antibiotic Therapy             6-12 Hrs later  > 0.5                Strongly Recommended            >0.25 - <0.5         Recommended  0.1 - 0.25           Discouraged                   Remeasure/reassess PCT  <0.1                 Strongly Discouraged          Remeasure/reassess PCT      As 28 day mortality risk marker: \"Change in Procalcitonin Result\" (> 80 % or <=80 %) if Day 0 (or Day 1) and Day 4 values are available. Refer to http://www.CardioLogspct-calculator.com/   Change in PCT <=80 %   A decrease of PCT levels below or equal to 80 % defines a positive change in PCT test result representing a higher risk for 28-day all-cause mortality of patients diagnosed with severe sepsis or septic shock.  Change in PCT > 80 %   A decrease of PCT levels of more than 80 % defines a negative change in PCT result representing a lower risk for 28-day all-cause mortality of patients diagnosed with severe sepsis or septic shock.                Results may be falsely decreased if patient taking Biotin.     BNP [[id number]]  (Normal) Collected:  03/17/20 0300    Specimen:  Blood Updated:  03/17/20 0431     proBNP 1,464.0 pg/mL     Narrative:       Among patients with dyspnea, NT-proBNP is highly sensitive for the detection of acute congestive heart failure. In addition NT-proBNP of <300 pg/ml effectively rules out acute congestive heart failure with 99% negative predictive value.    Results may be falsely decreased if patient taking Biotin.      CBC & Differential [[id number]] Collected:  " 03/17/20 0300    Specimen:  Blood Updated:  03/17/20 0409    Narrative:       The following orders were created for panel order CBC & Differential.  Procedure                               Abnormality         Status                     ---------                               -----------         ------                     CBC Auto Differential[[id number]]        Abnormal            Final result                 Please view results for these tests on the individual orders.    CBC Auto Differential [[id number]]  (Abnormal) Collected:  03/17/20 0300    Specimen:  Blood Updated:  03/17/20 0409     WBC 7.70 10*3/mm3      RBC 3.37 10*6/mm3      Hemoglobin 10.5 g/dL      Hematocrit 31.4 %      MCV 93.1 fL      MCH 31.1 pg      MCHC 33.4 g/dL      RDW 13.5 %      RDW-SD 43.8 fl      MPV 6.9 fL      Platelets 271 10*3/mm3      Neutrophil % 72.2 %      Lymphocyte % 17.8 %      Monocyte % 9.2 %      Eosinophil % 0.6 %      Basophil % 0.2 %      Neutrophils, Absolute 5.60 10*3/mm3      Lymphocytes, Absolute 1.40 10*3/mm3      Monocytes, Absolute 0.70 10*3/mm3      Eosinophils, Absolute 0.00 10*3/mm3      Basophils, Absolute 0.00 10*3/mm3      nRBC 0.0 /100 WBC         Imaging Results (Last 72 Hours)     Procedure Component Value Units Date/Time    XR Chest 1 View [[id number]] Collected:  03/17/20 0818     Updated:  03/17/20 0821    Narrative:       DATE OF EXAM:  3/17/2020 8:12 AM     PROCEDURE:  XR CHEST 1 VW-     INDICATIONS:  Fatigue, COPD, cough, coronary artery disease, hypertension.      COMPARISON:  3/14/2020.     TECHNIQUE:   Single radiographic view of the chest was obtained.     FINDINGS:  The heart size is normal. The patient's had a previous median  sternotomy. The patient also has underlying emphysema. The pulmonary  vascular markings are normal. There are new areas of patchy  consolidation in the right midlung zone and left lung base suspicious  for pneumonia. There is no pleural effusion or pneumothorax.  There  are  chronic age-related changes involving the bony thorax and thoracic  aorta.       Impression:          1. Emphysema.  2. New consolidation in the right midlung zone and left lung base  suspicious for pneumonia.     Electronically Signed By-Harman Zimmer On:3/17/2020 8:19 AM  This report was finalized on [id number] by  Harman Zimmer, .    XR Outside Films [[id number]] Resulted:  03/16/20 0644     Updated:  03/16/20 0644    Narrative:       This procedure was auto-finalized with no dictation required.    XR Chest 1 View [[id number]] Collected:  03/15/20 0705     Updated:  03/15/20 0710    Narrative:       DATE OF EXAM:  3/14/2020 9:00 PM     PROCEDURE:  XR CHEST 1 VW-     INDICATIONS:  MD ordered. Possible PNA.     COMPARISON:  11/15/2012.     TECHNIQUE:   Single radiographic AP view of the chest was obtained.     FINDINGS:  Two AP upright portable views of the chest reveal mild cardiac  enlargement and no focal infiltrate. No pneumothorax. There is  emphysematous contour of the lungs.  The patient has undergone median  sternotomy and suspected CABG surgery. There is chronic calcified  granulomatous disease of the chest. The thoracic aorta is  atherosclerotic. There may be chronic interstitial lung disease, as  well. No significant interval change is seen since the prior study.       Impression:       No acute infiltrate is appreciated.     Electronically Signed By-Dr. Luis Rock MD On:3/15/2020 7:08 AM  This report was finalized on [id number] by Dr. Luis Rock MD.    US Renal Bilateral [[id number]] Collected:  03/14/20 2350     Updated:  03/14/20 2354    Narrative:       DATE OF EXAM:  3/14/2020 6:16 PM     PROCEDURE:  US RENAL BILATERAL-     INDICATIONS:  chivo acute renal failure     COMPARISON:  CT of the chest that included the superior part of the right and left  kidneys performed on 11/06/2012     TECHNIQUE:   Grayscale and color Doppler ultrasound evaluation of the kidneys and  urinary bladder was  performed.        FINDINGS:  The right kidney measures 8.9 cm x 3.8 cm x 4.4 symmetric size. No  evidence of stone or mass or obstruction right kidney. No abnormal  collection surrounds right kidney.     No focal abnormality seen in the wall of the moderately distended  urinary bladder. The prevoid urinary bladder measures 7.7 cm x 9.3 cm x  2.1 cm size.     Left kidney measures 9.7 cm x 3.8 cm x 4.2 cm size. No evidence of stone  or mass or obstruction left kidney. No abnormal fluid collection  surrounds left kidney.        Impression:          1. The right and left kidneys show no evidence of stone or mass or  obstruction.  2. No focal abnormality seen in the wall the moderately distended  urinary bladder.     Electronically Signed By-DR. Jamie Sims MD On:3/14/2020 11:52  PM  This report was finalized on [id number] by DR. Jamie Sims MD.    CT Head Without Contrast [[id number]] Collected:  03/14/20 1811     Updated:  03/14/20 2014    Narrative:       EXAMINATION: CT HEAD WITHOUT CONTRAST    DATE: 3/14/2020 7:57 PM    INDICATION: Confusion, delirium    COMPARISON: None available at the time of this dictation.    TECHNIQUE: Noncontrast imaging obtained from the vertex to the skull base.  CT dose lowering techniques were used, to include: automated exposure control, adjustment for patient size, and or use of iterative reconstruction.?    FINDINGS:    Soft Tissues: No significant soft tissue abnormality.    Skull: No underlying skull fracture or radiopaque foreign body.    Sinuses: Paranasal sinuses are clear.    Mastoids: Mastoid air cells are clear.     Globes and Orbits: Globes and orbits are intact.    Brain: No acute hemorrhage.  No midline shift, masses, or mass effect.  No evidence of acute infarct by noncontrast CT.    Ventricles and Cisterns: Ventricular size and configuration is within normal limits. Basal cisterns are patent. No abnormal extra-axial fluid collection.    Senescent  Changes: Mild/moderate volume loss. Mild white matter hypoattenuation favoring chronic microvascular ischemic changes.    Remote infarct changes within the cerebellum bilaterally.        Impression:           1. No acute intracranial abnormalities by CT examination.    2. Mild to moderate volume loss and chronic microvascular ischemic changes. Arteriosclerosis.    3. Remote infarcts within the cerebellum bilaterally.      Electronically signed by:  Devante Irvin M.D.    3/14/2020 6:13 PM          Results for orders placed during the hospital encounter of 03/14/20   Adult Transthoracic Echo Complete W/ Cont if Necessary Per Protocol    Narrative · Estimated EF = 60%.  · Left ventricular systolic function is normal.     Indications  Hypertension    Technically satisfactory study.  Mitral valve is thickened with adequate opening motion.  Mitral annular   calcification is present.  Tricuspid valve is structurally normal.  Aortic valve is thickened with adequate opening motion.  Pulmonic valve could not be well visualized.  No evidence for mitral tricuspid or aortic regurgitation is seen by   Doppler study.  Left atrium is normal in size.  Right atrium is normal in size.  Left ventricle is normal in size and contractility with ejection fraction   of 60%.  Right ventricle is normal in size.  Atrial septum is intact.  Aorta is normal.  No pericardial effusion or intracardiac thrombus is seen.    Impression  Thickened mitral and aortic valves with adequate opening motion.  Left ventricular size and contractility is normal with ejection fraction   of 60%  No pericardial effusion or intracardiac thrombus is seen.           Medication Review  Scheduled Meds:  [START ON 3/18/2020] clonazePAM 0.5 mg Oral Daily   clopidogrel 75 mg Oral Daily   enoxaparin 30 mg Subcutaneous Daily   gabapentin 100 mg Oral TID   ipratropium-albuterol 3 mL Nebulization 4x Daily - RT   metoprolol succinate XL 50 mg Oral Daily   predniSONE 5 mg Oral  Daily   sodium chloride 10 mL Intravenous Q12H   traZODone 50 mg Oral Nightly     Continuous Infusions:   PRN Meds:.•  acetaminophen **OR** acetaminophen **OR** acetaminophen  •  ondansetron **OR** ondansetron  •  sodium chloride    Assessment/Plan       Acute renal failure (ARF) (CMS/HCC)    Benign hypertensive heart disease    Cardiomyopathy, ischemic    Chronic obstructive pulmonary disease (CMS/HCC)    Coronary artery disease    Hypertension      Patient Active Problem List   Diagnosis   • Acute renal failure (ARF) (CMS/HCC)   • Benign hypertensive heart disease   • Cardiomyopathy, ischemic   • Chronic obstructive pulmonary disease (CMS/HCC)   • Coronary artery disease   • Hypertension           Fawad Lopez MD  03/17/20  20:06

## 2020-03-18 NOTE — PLAN OF CARE
Problem: Patient Care Overview  Goal: Plan of Care Review  Outcome: Ongoing (interventions implemented as appropriate)  Flowsheets (Taken 3/18/2020 1105)  Outcome Summary: Patient continues to demonstrate poor safety with all functional mobility. She requires A of 2 to manage tubes and lines, provide max cues for safety and min A for balance. Pt demos confusion, poor problem solving, impulsivity, poor sequencing. Pt with significantly forward flexed posture- trunk parallel with the floor without max cues frequently to improve. She chronically requires 2L o2 and is not safe with tubing or aware of management of lines during mobility. Patient will require IP rehab unless she has 24/7 assist at home for safety due to her high falls risk and impulsivity.

## 2020-03-18 NOTE — PLAN OF CARE
Problem: Patient Care Overview  Goal: Plan of Care Review  Outcome: Ongoing (interventions implemented as appropriate)  Note:   No changes overnight. Pt maintaining in sinus rhythm at this time.  Goal: Individualization and Mutuality  Outcome: Ongoing (interventions implemented as appropriate)  Goal: Discharge Needs Assessment  Outcome: Ongoing (interventions implemented as appropriate)  Goal: Interprofessional Rounds/Family Conf  Outcome: Ongoing (interventions implemented as appropriate)     Problem: Fall Risk (Adult)  Goal: Absence of Fall  Outcome: Ongoing (interventions implemented as appropriate)     Problem: Pain, Chronic (Adult)  Goal: Acceptable Pain/Comfort Level and Functional Ability  Outcome: Ongoing (interventions implemented as appropriate)     Problem: Skin Injury Risk (Adult)  Goal: Skin Health and Integrity  Outcome: Ongoing (interventions implemented as appropriate)     Problem: Confusion, Acute (Adult)  Goal: Cognitive/Functional Impairments Minimized  Outcome: Ongoing (interventions implemented as appropriate)  Goal: Safety  Outcome: Ongoing (interventions implemented as appropriate)     Problem: Renal Failure/Kidney Injury, Acute (Adult)  Goal: Signs and Symptoms of Listed Potential Problems Will be Absent, Minimized or Managed (Renal Failure/Kidney Injury, Acute)  Outcome: Ongoing (interventions implemented as appropriate)

## 2020-03-19 ENCOUNTER — READMISSION MANAGEMENT (OUTPATIENT)
Dept: CALL CENTER | Facility: HOSPITAL | Age: 80
End: 2020-03-19

## 2020-03-19 NOTE — PROGRESS NOTES
Case Management Discharge Note      Final Note: Home with F [facility]             Home Medical Care      Service Provider Request Status Selected Services Address Phone Number Fax Number    [facility] [place] Selected Home Health Services [address] [address] [phone]             Final Discharge Disposition Code: 06 - home with home health care

## 2020-03-19 NOTE — PROGRESS NOTES
Case Management Discharge Note      Final Note: Home with F [facility]             Home Medical Care      Service Provider Request Status Selected Services Address Phone Number Fax Number    [facility] Selected Home Health Services [address] [address] [phone]

## 2020-03-20 NOTE — OUTREACH NOTE
Prep Survey      Responses   [facility] facility patient discharged from?  [facility]   Is LACE score < 7 ?  No   Eligibility  Readm Mgmt   Discharge diagnosis  acute metabolic encephalopathy, STEPHEN   Does the patient have one of the following disease processes/diagnoses(primary or secondary)?  Other   Does the patient have Home health ordered?  Yes   What is the Home health agency?   [facility]   Is there a DME ordered?  No   Prep survey completed?  Yes          Lauren Page RN

## 2020-03-23 ENCOUNTER — READMISSION MANAGEMENT (OUTPATIENT)
Dept: CALL CENTER | Facility: HOSPITAL | Age: 80
End: 2020-03-23

## 2020-03-23 NOTE — OUTREACH NOTE
Medical Week 1 Survey      Responses   [facility] [place] patient discharged from?  [facility]   Does the patient have one of the following disease processes/diagnoses(primary or secondary)?  Other   Is there a successful TCM telephone encounter documented?  No   Week 1 attempt successful?  No   Unsuccessful attempts  Attempt 3          Dayanara Stone LPN

## 2020-03-24 PROCEDURE — 93010 ELECTROCARDIOGRAM REPORT: CPT | Performed by: INTERNAL MEDICINE

## 2020-03-31 ENCOUNTER — READMISSION MANAGEMENT (OUTPATIENT)
Dept: CALL CENTER | Facility: HOSPITAL | Age: 80
End: 2020-03-31

## 2020-03-31 NOTE — OUTREACH NOTE
Medical Week 2 Survey      Responses   [facility] patient discharged from?  [facility]   Does the patient have one of the following disease processes/diagnoses(primary or secondary)?  Other   Week 2 attempt successful?  Yes   Call start time  1440   Discharge diagnosis  acute metabolic encephalopathy, STEPHEN   Call end time  1445   Is patient permission given to speak with other caregiver?  Yes   List who call center can speak with  daughter/Dary   Person spoke with today (if not patient) and relationship  daughter/Dary   Meds reviewed with patient/caregiver?  Yes   Is the patient having any side effects they believe may be caused by any medication additions or changes?  No   Does the patient have all medications ordered at discharge?  Yes   Is the patient taking all medications as directed (includes completed medication regime)?  Yes   Does the patient have a primary care provider?   Yes   Does the patient have an appointment with their PCP within 7 days of discharge?  No   Has the patient kept scheduled appointments due by today?  N/A   Comments  Daughter states due to her improvement and the Covid19 they are waiting to follow up with her PCP but they are aware she can make an appt if she should have any issues   What is the Home health agency?   [facility]   Has home health visited the patient within 72 hours of discharge?  Call prior to 72 hours   Home health comments  they have heard from them and they are to be coming out later this week   Did the patient receive a copy of their discharge instructions?  Yes   Nursing interventions  Reviewed instructions with patient   What is the patient's perception of their health status since discharge?  Improving   Is the patient/caregiver able to teach back signs and symptoms related to disease process for when to call PCP?  Yes   Is the patient/caregiver able to teach back signs and symptoms related to disease process for when to call 911?  Yes   Is the patient/caregiver  able to teach back the hierarchy of who to call/visit for symptoms/problems? PCP, Specialist, Home health nurse, Urgent Care, ED, 911  Yes   Additional teach back comments  Daughter states she is doing well and has improved.     Week 2 Call Completed?  Yes   Wrap up additional comments  No questions or needs at this time          Dayanara Stone LPN

## 2021-08-05 ENCOUNTER — INPATIENT HOSPITAL (OUTPATIENT)
Dept: URBAN - METROPOLITAN AREA HOSPITAL 76 | Facility: HOSPITAL | Age: 81
End: 2021-08-05
Payer: COMMERCIAL

## 2021-08-05 DIAGNOSIS — K80.50 CALCULUS OF BILE DUCT WITHOUT CHOLANGITIS OR CHOLECYSTITIS W: ICD-10-CM

## 2021-08-05 DIAGNOSIS — J96.00 ACUTE RESPIRATORY FAILURE, UNSPECIFIED WHETHER WITH HYPOXIA: ICD-10-CM

## 2021-08-05 DIAGNOSIS — R74.8 ABNORMAL LEVELS OF OTHER SERUM ENZYMES: ICD-10-CM

## 2021-08-05 DIAGNOSIS — I25.10 ATHEROSCLEROTIC HEART DISEASE OF NATIVE CORONARY ARTERY WITH: ICD-10-CM

## 2021-08-05 PROCEDURE — 99222 1ST HOSP IP/OBS MODERATE 55: CPT | Performed by: NURSE PRACTITIONER

## 2021-08-06 PROCEDURE — 99232 SBSQ HOSP IP/OBS MODERATE 35: CPT | Performed by: NURSE PRACTITIONER

## 2021-08-07 ENCOUNTER — INPATIENT HOSPITAL (OUTPATIENT)
Dept: URBAN - METROPOLITAN AREA HOSPITAL 76 | Facility: HOSPITAL | Age: 81
End: 2021-08-07
Payer: COMMERCIAL

## 2021-08-07 DIAGNOSIS — R74.8 ABNORMAL LEVELS OF OTHER SERUM ENZYMES: ICD-10-CM

## 2021-08-07 DIAGNOSIS — I25.10 ATHEROSCLEROTIC HEART DISEASE OF NATIVE CORONARY ARTERY WITH: ICD-10-CM

## 2021-08-07 DIAGNOSIS — K80.50 CALCULUS OF BILE DUCT WITHOUT CHOLANGITIS OR CHOLECYSTITIS W: ICD-10-CM

## 2021-08-07 PROCEDURE — 99232 SBSQ HOSP IP/OBS MODERATE 35: CPT | Performed by: INTERNAL MEDICINE

## 2021-08-08 PROCEDURE — 99232 SBSQ HOSP IP/OBS MODERATE 35: CPT | Performed by: INTERNAL MEDICINE

## 2021-08-09 ENCOUNTER — INPATIENT HOSPITAL (OUTPATIENT)
Dept: URBAN - METROPOLITAN AREA HOSPITAL 76 | Facility: HOSPITAL | Age: 81
End: 2021-08-09
Payer: COMMERCIAL

## 2021-08-09 DIAGNOSIS — K80.50 CALCULUS OF BILE DUCT WITHOUT CHOLANGITIS OR CHOLECYSTITIS W: ICD-10-CM

## 2021-08-09 DIAGNOSIS — R94.5 ABNORMAL RESULTS OF LIVER FUNCTION STUDIES: ICD-10-CM

## 2021-08-09 DIAGNOSIS — K83.8 OTHER SPECIFIED DISEASES OF BILIARY TRACT: ICD-10-CM

## 2021-08-09 PROCEDURE — 99232 SBSQ HOSP IP/OBS MODERATE 35: CPT | Performed by: NURSE PRACTITIONER

## 2021-08-11 ENCOUNTER — INPATIENT HOSPITAL (OUTPATIENT)
Dept: URBAN - METROPOLITAN AREA HOSPITAL 76 | Facility: HOSPITAL | Age: 81
End: 2021-08-11
Payer: COMMERCIAL

## 2021-08-11 DIAGNOSIS — Z99.81 DEPENDENCE ON SUPPLEMENTAL OXYGEN: ICD-10-CM

## 2021-08-11 DIAGNOSIS — J96.00 ACUTE RESPIRATORY FAILURE, UNSPECIFIED WHETHER WITH HYPOXIA: ICD-10-CM

## 2021-08-11 DIAGNOSIS — R94.5 ABNORMAL RESULTS OF LIVER FUNCTION STUDIES: ICD-10-CM

## 2021-08-11 DIAGNOSIS — D64.9 ANEMIA, UNSPECIFIED: ICD-10-CM

## 2021-08-11 DIAGNOSIS — K80.50 CALCULUS OF BILE DUCT WITHOUT CHOLANGITIS OR CHOLECYSTITIS W: ICD-10-CM

## 2021-08-11 DIAGNOSIS — J44.9 CHRONIC OBSTRUCTIVE PULMONARY DISEASE, UNSPECIFIED: ICD-10-CM

## 2021-08-11 PROCEDURE — 99231 SBSQ HOSP IP/OBS SF/LOW 25: CPT | Performed by: NURSE PRACTITIONER

## 2021-08-12 ENCOUNTER — HOME HEALTH ADMISSION (OUTPATIENT)
Dept: HOME HEALTH SERVICES | Facility: HOME HEALTHCARE | Age: 81
End: 2021-08-12

## 2021-08-12 ENCOUNTER — TRANSCRIBE ORDERS (OUTPATIENT)
Dept: HOME HEALTH SERVICES | Facility: HOME HEALTHCARE | Age: 81
End: 2021-08-12

## 2021-08-12 ENCOUNTER — INPATIENT HOSPITAL (OUTPATIENT)
Dept: URBAN - METROPOLITAN AREA HOSPITAL 76 | Facility: HOSPITAL | Age: 81
End: 2021-08-12
Payer: COMMERCIAL

## 2021-08-12 DIAGNOSIS — K80.20 CALCULUS OF GALLBLADDER WITHOUT CHOLECYSTITIS WITHOUT OBSTRU: ICD-10-CM

## 2021-08-12 DIAGNOSIS — K85.90 ACUTE PANCREATITIS, UNSPECIFIED COMPLICATION STATUS, UNSPECIFIED PANCREATITIS TYPE: Primary | ICD-10-CM

## 2021-08-12 DIAGNOSIS — K80.50 CALCULUS OF BILE DUCT WITHOUT CHOLANGITIS OR CHOLECYSTITIS W: ICD-10-CM

## 2021-08-12 PROCEDURE — 43264 ERCP REMOVE DUCT CALCULI: CPT | Mod: 59 | Performed by: INTERNAL MEDICINE

## 2021-08-12 PROCEDURE — 43265 ERCP LITHOTRIPSY CALCULI: CPT | Mod: 59 | Performed by: INTERNAL MEDICINE

## 2021-08-12 PROCEDURE — 43274 ERCP DUCT STENT PLACEMENT: CPT | Performed by: INTERNAL MEDICINE

## 2021-08-13 ENCOUNTER — HOME CARE VISIT (OUTPATIENT)
Dept: HOME HEALTH SERVICES | Facility: HOME HEALTHCARE | Age: 81
End: 2021-08-13

## 2021-08-13 PROCEDURE — G0299 HHS/HOSPICE OF RN EA 15 MIN: HCPCS

## 2021-08-14 VITALS
WEIGHT: 85 LBS | OXYGEN SATURATION: 93 % | SYSTOLIC BLOOD PRESSURE: 100 MMHG | DIASTOLIC BLOOD PRESSURE: 60 MMHG | BODY MASS INDEX: 14.51 KG/M2 | RESPIRATION RATE: 16 BRPM | HEIGHT: 64 IN | TEMPERATURE: 98.2 F | HEART RATE: 69 BPM

## 2021-08-14 NOTE — HOME HEALTH
81 year old very fraile female was admitted to [facility] for abd pain.  Pt with unintentional weight loss over the last 6 months. Pt with COPD, CAD,and dx with dementia.  Pt home with 2- stage 2 pressure ulcers to back.  Bordered foam was rolled off on the floor upon arrival.  Area cleaned and dressing changed to 2 stage 2 pressure ulcers to back  photo taken.  Legs dried  assisted pt with lotioning.     Pt lives with daughter and son in law.  Daughter was in her bedroom during visit, she manages pts meds.  Son in law attempted to have her come out to assist nurse with med reconciliation but she did not come out.   No DC sheet found in home.  Found DC sheet in chart review media.  Reviewed all meds on list with pt.      Plan for next visit:  see if new meds in home and reconcil. Assess wounds to back and wound care  Note sent to [facility] to order supplies.  NS 4x4s and optifoam with border.   May need to bring to next visit

## 2021-08-16 ENCOUNTER — HOME CARE VISIT (OUTPATIENT)
Dept: HOME HEALTH SERVICES | Facility: HOME HEALTHCARE | Age: 81
End: 2021-08-16

## 2021-08-16 NOTE — CASE COMMUNICATION
Move PT Evaluation from 8/16/2021 to 8/17/2021 to allow time for PT to make contact with family and schedule appointment.

## 2021-08-17 ENCOUNTER — HOME CARE VISIT (OUTPATIENT)
Dept: HOME HEALTH SERVICES | Facility: HOME HEALTHCARE | Age: 81
End: 2021-08-17

## 2021-08-17 VITALS
HEART RATE: 86 BPM | TEMPERATURE: 97 F | SYSTOLIC BLOOD PRESSURE: 126 MMHG | DIASTOLIC BLOOD PRESSURE: 78 MMHG | RESPIRATION RATE: 18 BRPM

## 2021-08-17 PROCEDURE — G0299 HHS/HOSPICE OF RN EA 15 MIN: HCPCS

## 2021-08-17 PROCEDURE — G0152 HHCP-SERV OF OT,EA 15 MIN: HCPCS

## 2021-08-18 ENCOUNTER — HOME CARE VISIT (OUTPATIENT)
Dept: HOME HEALTH SERVICES | Facility: HOME HEALTHCARE | Age: 81
End: 2021-08-18

## 2021-08-18 VITALS
SYSTOLIC BLOOD PRESSURE: 110 MMHG | DIASTOLIC BLOOD PRESSURE: 64 MMHG | TEMPERATURE: 98.4 F | HEART RATE: 97 BPM | OXYGEN SATURATION: 99 %

## 2021-08-18 NOTE — HOME HEALTH
Pt is an 81 y.o female who lives in a 1 story home with dtr and son grecia.  Pt recently hospitalized secondary to abdominal pain.      Past medical hx COPD CAD dementia      PLOF pt independent with toileting and feeding.  Pt required MIN assist with bathing and dressing.  Pts dtr maintains the home.    Currently pt independent with feeding CGA with toileting and requries MOD assist with bathing and dressing.  Pts dtr maintains the home.      Skilled OT for ther ex/HEP transfers and adl training.  Pt and therapist in agreement with goals and poc.

## 2021-08-20 ENCOUNTER — HOME CARE VISIT (OUTPATIENT)
Dept: HOME HEALTH SERVICES | Facility: HOME HEALTHCARE | Age: 81
End: 2021-08-20

## 2021-08-20 VITALS
TEMPERATURE: 97.8 F | RESPIRATION RATE: 17 BRPM | SYSTOLIC BLOOD PRESSURE: 103 MMHG | DIASTOLIC BLOOD PRESSURE: 52 MMHG | OXYGEN SATURATION: 99 % | HEART RATE: 81 BPM

## 2021-08-20 PROCEDURE — G0300 HHS/HOSPICE OF LPN EA 15 MIN: HCPCS

## 2021-08-21 ENCOUNTER — HOME CARE VISIT (OUTPATIENT)
Dept: HOME HEALTH SERVICES | Facility: HOME HEALTHCARE | Age: 81
End: 2021-08-21

## 2021-08-21 NOTE — HOME HEALTH
Patient resting abed when SN arrived. Appetite improving. Daughter tries to get patient to come to kitchen for at least one meal a day. Bedside commode is put away during the day so patinet will walk to bathroom . Patient would stay in bed at all times if allwed. States she is tired and prefers staying in bed. Pain level at 3/10 to her back. No recent falls have occurred.No changes to medications or insurance.      NEXT VISIT:  Assess CP  Assess Safety, Pain, Falls  Wound assess and care

## 2021-08-21 NOTE — CASE COMMUNICATION
Previous Communication was in error. PT Evaluation needs to be moved to week of 8/22/2012.  Please schedule PT evaluation on 8/24/2021.

## 2021-08-21 NOTE — CASE COMMUNICATION
Attention: Dr. Yoshi Burris MD  Patient: Dipti Perez  : 1940  Patient not seen for PT evaluation on 2021 as scheduled due to PT unable to make contact with patient - no answer of phone.  PT Evaluation to be moved to week of 2021 and attempts to complete PT evaluation will continue.  Scheduling to move PT evaluation to week of 2021.

## 2021-08-23 ENCOUNTER — HOME CARE VISIT (OUTPATIENT)
Dept: HOME HEALTH SERVICES | Facility: HOME HEALTHCARE | Age: 81
End: 2021-08-23

## 2021-08-23 NOTE — CASE COMMUNICATION
Huddle Summary POD 3 - 8/23/2021  PT has been unable to contact to schedule eval.  SN notified and to ask patient/CG if they want PT services. PT to schedule Thursday, 8/26/2021 due to SN and OT visits this week.    Participating: Christiano Martines, PT, Darling Qureshi, TAY; Via electronic communication: Ermelinda Astudillo RN, Clinical Manager.

## 2021-08-24 ENCOUNTER — HOME CARE VISIT (OUTPATIENT)
Dept: HOME HEALTH SERVICES | Facility: HOME HEALTHCARE | Age: 81
End: 2021-08-24

## 2021-08-25 ENCOUNTER — HOME CARE VISIT (OUTPATIENT)
Dept: HOME HEALTH SERVICES | Facility: HOME HEALTHCARE | Age: 81
End: 2021-08-25

## 2021-08-26 ENCOUNTER — HOME CARE VISIT (OUTPATIENT)
Dept: HOME HEALTH SERVICES | Facility: HOME HEALTHCARE | Age: 81
End: 2021-08-26

## 2021-08-27 ENCOUNTER — HOME CARE VISIT (OUTPATIENT)
Dept: HOME HEALTH SERVICES | Facility: HOME HEALTHCARE | Age: 81
End: 2021-08-27

## 2021-08-27 VITALS
OXYGEN SATURATION: 100 % | RESPIRATION RATE: 17 BRPM | HEART RATE: 81 BPM | TEMPERATURE: 97.8 F | SYSTOLIC BLOOD PRESSURE: 113 MMHG | DIASTOLIC BLOOD PRESSURE: 93 MMHG

## 2021-08-27 VITALS
RESPIRATION RATE: 18 BRPM | SYSTOLIC BLOOD PRESSURE: 118 MMHG | HEIGHT: 64 IN | DIASTOLIC BLOOD PRESSURE: 60 MMHG | TEMPERATURE: 98.3 F | HEART RATE: 71 BPM | BODY MASS INDEX: 14.51 KG/M2 | OXYGEN SATURATION: 95 % | WEIGHT: 85 LBS

## 2021-08-27 PROCEDURE — G0151 HHCP-SERV OF PT,EA 15 MIN: HCPCS

## 2021-08-27 PROCEDURE — G0300 HHS/HOSPICE OF LPN EA 15 MIN: HCPCS

## 2021-08-28 NOTE — CASE COMMUNICATION
Please schedule last PT visit on 9/16/2021 and label as a PT discipline discharge - please assign to a PT.  I am unable to schedule or assign this visit due to being off that week.

## 2021-08-28 NOTE — HOME HEALTH
Patient denies pain, has had no falls, there have been no medication changes or insurance.  Wound assess and care provided, tolerated well.    NEXT VISIT:  Assess CP  Assess Safety, Pain, Falls  Wound assess and care  Assess if supplies delivered

## 2021-08-28 NOTE — HOME HEALTH
PT Evaluation Summary: The patient is an 81 year old female admitted to home health services by skilled nursing on 8/13/2021. Admission followed return home from hospitalization for abdominal pain with unintentional weight loss, pt reportedly found to have gallstones. At time of admission patient found to have 2 stage 2 pressure wounds to back.    Past Medical history includes: COPD, CAD, and dementia. Uses supplemental oxygen at 2 lpm via NC.    Prior Functional level: For an extended period spent much of her time in her room in bed, per patient due to shortness of air (SOA).    Social History: lives with her daughter Dary Lofton and son-in-law.    PT assessment reveals the patient to have marked muscle atrophy throughout; bilateral lower extremity weakness (3+/5), impaired transfers (requires from stand-by assistance to minimal assistance), abnormal gait (flexed posture, requires minimal assistance and exhibits short, shuffling steps, limited to 15 feet with need for walker and hands on assistance to steady), high falls risk.    The patient will benefit from PT interventions of therapeutic exercise, transfer training and gait training to address these problems.     Session Notes: PT has had difficulty reaching caregiver and scheduling patient prior to this day. Patient's daughter, Dary, present during portions of evaluation. She asserts that she will help patient perform home exercise program (HEP) as recommended by PT this day.    Plan for next visit: Continue with progression of standing exercises and gait.

## 2021-09-01 ENCOUNTER — HOME CARE VISIT (OUTPATIENT)
Dept: HOME HEALTH SERVICES | Facility: HOME HEALTHCARE | Age: 81
End: 2021-09-01

## 2021-09-01 PROCEDURE — G0299 HHS/HOSPICE OF RN EA 15 MIN: HCPCS

## 2021-09-01 NOTE — HOME HEALTH
Pt home lying in bed strong odor at bed side.  Pts BSC has not been emptied in some time.   Nurse emptied for pt.  Daughter did not come out of room and son in law on couch.  Pt  appears very weak and frail.  Instructed on nutrition and hydration.  Water bottle at bed side.  Pt states she has not ate today. Pt requested PB&J sandwich. Informed son in law and he did make one for her.  ENcouraged pt to get out of bed more and go to kitchen for meals.  Hard to reach pt by phone. Daughter does not alway answer calls.  Nurse did not get ahold of her so did a driveby and pt was home. Wound care performed to back.  Wounds improving.Measured but photo did not download to [employer]. Supplies were delivered    Plan for next visit: assess N/H. assess and perform wound care. assess home safety

## 2021-09-02 ENCOUNTER — HOME CARE VISIT (OUTPATIENT)
Dept: HOME HEALTH SERVICES | Facility: HOME HEALTHCARE | Age: 81
End: 2021-09-02

## 2021-09-02 VITALS
DIASTOLIC BLOOD PRESSURE: 88 MMHG | HEART RATE: 80 BPM | SYSTOLIC BLOOD PRESSURE: 110 MMHG | TEMPERATURE: 97.8 F | OXYGEN SATURATION: 95 % | RESPIRATION RATE: 18 BRPM

## 2021-09-03 ENCOUNTER — HOME CARE VISIT (OUTPATIENT)
Dept: HOME HEALTH SERVICES | Facility: HOME HEALTHCARE | Age: 81
End: 2021-09-03

## 2021-09-03 PROCEDURE — G0299 HHS/HOSPICE OF RN EA 15 MIN: HCPCS

## 2021-09-04 VITALS
OXYGEN SATURATION: 98 % | TEMPERATURE: 98.2 F | SYSTOLIC BLOOD PRESSURE: 124 MMHG | RESPIRATION RATE: 18 BRPM | DIASTOLIC BLOOD PRESSURE: 78 MMHG | HEART RATE: 86 BPM

## 2021-09-04 NOTE — HOME HEALTH
Pt continues to be very weak and frail. Empty plate on bed.  Encouraged pt to walk with nurse today.  She said was tired and stayed in bed.  Pt states she does not have any problems iwth transfering to [facility].  Wound care performed to back. Wounds improving. Measured and attempted to photo  but due to reception photo would not download to [place] x several attempts.   Plan for next visit: assess N/H. assess and perform wound care. assess home safety. Encourage pt to perfrom HEP.

## 2021-09-08 ENCOUNTER — HOME CARE VISIT (OUTPATIENT)
Dept: HOME HEALTH SERVICES | Facility: HOME HEALTHCARE | Age: 81
End: 2021-09-08

## 2021-09-08 VITALS
DIASTOLIC BLOOD PRESSURE: 58 MMHG | SYSTOLIC BLOOD PRESSURE: 110 MMHG | TEMPERATURE: 97.8 F | HEART RATE: 95 BPM | OXYGEN SATURATION: 90 %

## 2021-09-08 PROCEDURE — G0158 HHC OT ASSISTANT EA 15: HCPCS

## 2021-09-08 NOTE — HOME HEALTH
Pt's daughters greeted OG at the door, pt was asleep in her bed. Pt easily awoken and consents to skilled OT for BUE strengthening HEP however refused self care training. Initial visit and rapport established. The pt was engaged in skilled OT session, motivated with cues to improve overall I.

## 2021-09-09 ENCOUNTER — HOME CARE VISIT (OUTPATIENT)
Dept: HOME HEALTH SERVICES | Facility: HOME HEALTHCARE | Age: 81
End: 2021-09-09

## 2021-09-09 VITALS
SYSTOLIC BLOOD PRESSURE: 124 MMHG | RESPIRATION RATE: 16 BRPM | OXYGEN SATURATION: 98 % | HEART RATE: 98 BPM | DIASTOLIC BLOOD PRESSURE: 68 MMHG | TEMPERATURE: 96 F

## 2021-09-09 VITALS — TEMPERATURE: 98.3 F | OXYGEN SATURATION: 98 % | RESPIRATION RATE: 18 BRPM | HEART RATE: 91 BPM

## 2021-09-09 PROCEDURE — G0151 HHCP-SERV OF PT,EA 15 MIN: HCPCS

## 2021-09-09 PROCEDURE — G0299 HHS/HOSPICE OF RN EA 15 MIN: HCPCS

## 2021-09-09 NOTE — HOME HEALTH
"pt reports feeling well with no reports of falls.  Pt reports no pain.  Pt was sleeping upon PT arrival and took increased time to arouse.  Pt reports she stays in bed majority of time.  Pt tolerated ther ex well this date with intermittent rest breaks.  Pt refused all gait and transfer training stating \"I just woke up and I'm too tired.  I don't want to get out of bed\""

## 2021-09-09 NOTE — HOME HEALTH
Pt  eating in bed upon arrival. Encouraged pt to walk with nurse today. She declined stating she does not feel like walking.  Wound care performed to back. Wounds improving. Photo taken  one pulled over and one did not due to poor reception.  Discharge planning done. IF wound healed possible DC next week.    Plan for next visit: possible Nursing DC. assess N/H. assess and perform wound care. assess home safety. Encourage pt to perfrom HEP.

## 2021-09-14 ENCOUNTER — HOME CARE VISIT (OUTPATIENT)
Dept: HOME HEALTH SERVICES | Facility: HOME HEALTHCARE | Age: 81
End: 2021-09-14

## 2021-09-14 VITALS
OXYGEN SATURATION: 92 % | HEART RATE: 94 BPM | SYSTOLIC BLOOD PRESSURE: 104 MMHG | DIASTOLIC BLOOD PRESSURE: 66 MMHG | RESPIRATION RATE: 18 BRPM | TEMPERATURE: 97 F

## 2021-09-14 PROCEDURE — G0299 HHS/HOSPICE OF RN EA 15 MIN: HCPCS

## 2021-09-15 NOTE — HOME HEALTH
"Upon entering home, a gentleman opened door and pointed to pt's bedroom, when SN asked pt. where her daughter was or if she had any questions, pt. states daughter went back to bed.  discussed with pt. today about last visit from , states \" That's fine\" . wound on back healed, SN discussed importance of getting out of bed more frequently, increasing water and protein in diet for better healing.  Pt. states she just wants to stay in bed.  Pt. denies any falls, pt. states right now she doesn't hurt.  Discharge from  at this time.  See remainder of assessment."

## 2021-09-16 ENCOUNTER — HOME CARE VISIT (OUTPATIENT)
Dept: HOME HEALTH SERVICES | Facility: HOME HEALTHCARE | Age: 81
End: 2021-09-16

## 2021-09-17 ENCOUNTER — HOME CARE VISIT (OUTPATIENT)
Dept: HOME HEALTH SERVICES | Facility: HOME HEALTHCARE | Age: 81
End: 2021-09-17

## 2021-09-22 ENCOUNTER — HOME CARE VISIT (OUTPATIENT)
Dept: HOME HEALTH SERVICES | Facility: HOME HEALTHCARE | Age: 81
End: 2021-09-22

## 2021-09-22 VITALS
RESPIRATION RATE: 19 BRPM | HEART RATE: 94 BPM | OXYGEN SATURATION: 98 % | SYSTOLIC BLOOD PRESSURE: 130 MMHG | TEMPERATURE: 97 F | DIASTOLIC BLOOD PRESSURE: 70 MMHG

## 2021-09-22 PROCEDURE — G0151 HHCP-SERV OF PT,EA 15 MIN: HCPCS

## 2021-09-22 NOTE — HOME HEALTH
PT Discharge  PT greeted by daughter Dary who pt is living with and another daughter from out of town visiting who is a nurse also present during this visit. Both daughter's well aware of anticipated d/c with exhaust of insurance benefits and pt limited compliance with ex . Daughter reported no falls, ER use since SOC and no MED changes or insurance changes since last clinician visit. Pt was in bed upon arrival with supplemental O2 in place at 2L. Pt's  pressure areas on back healed  with daughter reporting have been placing a padded dressing or gauze due to spinal bony prominence. Pt agreeable to this visit and to this discharge. Emphasized to pt and daughter importance on daily compliance of HEP at least BID and both reported understanding but daughter reported may be challenging due to pt compliance.

## 2021-09-30 ENCOUNTER — HOSPITAL ENCOUNTER (INPATIENT)
Facility: HOSPITAL | Age: 81
LOS: 9 days | Discharge: NURSING, REHAB OR LONG-TERM CARE | End: 2021-10-09
Attending: INTERNAL MEDICINE | Admitting: HOSPITALIST

## 2021-09-30 DIAGNOSIS — F41.9 ANXIETY DISORDER, UNSPECIFIED TYPE: ICD-10-CM

## 2021-09-30 DIAGNOSIS — J96.21 ACUTE ON CHRONIC RESPIRATORY FAILURE WITH HYPOXIA AND HYPERCAPNIA (HCC): Primary | ICD-10-CM

## 2021-09-30 DIAGNOSIS — J96.22 ACUTE ON CHRONIC RESPIRATORY FAILURE WITH HYPOXIA AND HYPERCAPNIA (HCC): Primary | ICD-10-CM

## 2021-09-30 PROBLEM — J44.9 CHRONIC OBSTRUCTIVE PULMONARY DISEASE (HCC): Chronic | Status: ACTIVE | Noted: 2020-03-14

## 2021-09-30 PROBLEM — E44.0 MODERATE PROTEIN-CALORIE MALNUTRITION (HCC): Chronic | Status: ACTIVE | Noted: 2020-03-18

## 2021-09-30 PROBLEM — I25.10 CORONARY ARTERY DISEASE: Chronic | Status: ACTIVE | Noted: 2020-03-14

## 2021-09-30 PROBLEM — I48.0 PAROXYSMAL ATRIAL FIBRILLATION (HCC): Status: ACTIVE | Noted: 2020-03-18

## 2021-09-30 PROBLEM — R77.8 ELEVATED TROPONIN: Status: ACTIVE | Noted: 2021-09-30

## 2021-09-30 PROBLEM — R79.89 ELEVATED BRAIN NATRIURETIC PEPTIDE (BNP) LEVEL: Status: ACTIVE | Noted: 2021-09-30

## 2021-09-30 PROBLEM — I25.5 CARDIOMYOPATHY, ISCHEMIC: Chronic | Status: ACTIVE | Noted: 2020-03-14

## 2021-09-30 PROBLEM — I10 HYPERTENSION: Chronic | Status: ACTIVE | Noted: 2020-03-14

## 2021-09-30 PROBLEM — Z95.1 HX OF CABG: Chronic | Status: ACTIVE | Noted: 2020-03-18

## 2021-09-30 PROBLEM — A41.9 SEPSIS (HCC): Status: ACTIVE | Noted: 2021-09-30

## 2021-09-30 PROBLEM — I48.0 PAROXYSMAL ATRIAL FIBRILLATION (HCC): Chronic | Status: ACTIVE | Noted: 2020-03-18

## 2021-09-30 PROCEDURE — 94799 UNLISTED PULMONARY SVC/PX: CPT

## 2021-09-30 PROCEDURE — 0202U NFCT DS 22 TRGT SARS-COV-2: CPT | Performed by: STUDENT IN AN ORGANIZED HEALTH CARE EDUCATION/TRAINING PROGRAM

## 2021-09-30 PROCEDURE — 94660 CPAP INITIATION&MGMT: CPT

## 2021-09-30 RX ORDER — SODIUM CHLORIDE 0.9 % (FLUSH) 0.9 %
10 SYRINGE (ML) INJECTION EVERY 12 HOURS SCHEDULED
Status: DISCONTINUED | OUTPATIENT
Start: 2021-10-01 | End: 2021-10-09 | Stop reason: HOSPADM

## 2021-09-30 RX ORDER — ONDANSETRON 2 MG/ML
4 INJECTION INTRAMUSCULAR; INTRAVENOUS EVERY 6 HOURS PRN
Status: DISCONTINUED | OUTPATIENT
Start: 2021-09-30 | End: 2021-10-09 | Stop reason: HOSPADM

## 2021-09-30 RX ORDER — INSULIN LISPRO 100 [IU]/ML
0-7 INJECTION, SOLUTION INTRAVENOUS; SUBCUTANEOUS AS NEEDED
Status: DISCONTINUED | OUTPATIENT
Start: 2021-09-30 | End: 2021-10-02

## 2021-09-30 RX ORDER — SODIUM CHLORIDE 0.9 % (FLUSH) 0.9 %
10 SYRINGE (ML) INJECTION AS NEEDED
Status: DISCONTINUED | OUTPATIENT
Start: 2021-09-30 | End: 2021-10-09 | Stop reason: HOSPADM

## 2021-09-30 RX ORDER — INSULIN LISPRO 100 [IU]/ML
0-7 INJECTION, SOLUTION INTRAVENOUS; SUBCUTANEOUS EVERY 6 HOURS SCHEDULED
Status: DISCONTINUED | OUTPATIENT
Start: 2021-10-01 | End: 2021-10-02

## 2021-09-30 RX ORDER — ONDANSETRON 4 MG/1
4 TABLET, FILM COATED ORAL EVERY 6 HOURS PRN
Status: DISCONTINUED | OUTPATIENT
Start: 2021-09-30 | End: 2021-10-09 | Stop reason: HOSPADM

## 2021-09-30 RX ORDER — OLANZAPINE 10 MG/2ML
1 INJECTION, POWDER, LYOPHILIZED, FOR SOLUTION INTRAMUSCULAR AS NEEDED
Status: DISCONTINUED | OUTPATIENT
Start: 2021-09-30 | End: 2021-10-09 | Stop reason: HOSPADM

## 2021-09-30 RX ORDER — ACETAMINOPHEN 650 MG/1
650 SUPPOSITORY RECTAL EVERY 4 HOURS PRN
Status: DISCONTINUED | OUTPATIENT
Start: 2021-09-30 | End: 2021-10-09 | Stop reason: HOSPADM

## 2021-09-30 RX ORDER — ACETAMINOPHEN 325 MG/1
650 TABLET ORAL EVERY 4 HOURS PRN
Status: DISCONTINUED | OUTPATIENT
Start: 2021-09-30 | End: 2021-10-09 | Stop reason: HOSPADM

## 2021-09-30 RX ORDER — DEXTROSE MONOHYDRATE 25 G/50ML
25 INJECTION, SOLUTION INTRAVENOUS
Status: DISCONTINUED | OUTPATIENT
Start: 2021-09-30 | End: 2021-10-09 | Stop reason: HOSPADM

## 2021-09-30 RX ORDER — IPRATROPIUM BROMIDE AND ALBUTEROL SULFATE 2.5; .5 MG/3ML; MG/3ML
3 SOLUTION RESPIRATORY (INHALATION)
Status: DISCONTINUED | OUTPATIENT
Start: 2021-10-01 | End: 2021-10-01

## 2021-09-30 RX ORDER — BUDESONIDE 0.5 MG/2ML
0.5 INHALANT ORAL
Status: DISCONTINUED | OUTPATIENT
Start: 2021-10-01 | End: 2021-10-01 | Stop reason: SDUPTHER

## 2021-09-30 RX ORDER — NITROGLYCERIN 0.4 MG/1
0.4 TABLET SUBLINGUAL
Status: DISCONTINUED | OUTPATIENT
Start: 2021-09-30 | End: 2021-10-09 | Stop reason: HOSPADM

## 2021-09-30 RX ORDER — HEPARIN SODIUM 5000 [USP'U]/ML
5000 INJECTION, SOLUTION INTRAVENOUS; SUBCUTANEOUS EVERY 8 HOURS SCHEDULED
Status: DISCONTINUED | OUTPATIENT
Start: 2021-10-01 | End: 2021-10-07

## 2021-09-30 RX ORDER — IPRATROPIUM BROMIDE AND ALBUTEROL SULFATE 2.5; .5 MG/3ML; MG/3ML
3 SOLUTION RESPIRATORY (INHALATION) EVERY 4 HOURS PRN
Status: DISCONTINUED | OUTPATIENT
Start: 2021-09-30 | End: 2021-10-09 | Stop reason: HOSPADM

## 2021-09-30 RX ORDER — NICOTINE POLACRILEX 4 MG
15 LOZENGE BUCCAL
Status: DISCONTINUED | OUTPATIENT
Start: 2021-09-30 | End: 2021-10-09 | Stop reason: HOSPADM

## 2021-10-01 ENCOUNTER — APPOINTMENT (OUTPATIENT)
Dept: GENERAL RADIOLOGY | Facility: HOSPITAL | Age: 81
End: 2021-10-01

## 2021-10-01 ENCOUNTER — APPOINTMENT (OUTPATIENT)
Dept: ULTRASOUND IMAGING | Facility: HOSPITAL | Age: 81
End: 2021-10-01

## 2021-10-01 ENCOUNTER — APPOINTMENT (OUTPATIENT)
Dept: CT IMAGING | Facility: HOSPITAL | Age: 81
End: 2021-10-01

## 2021-10-01 ENCOUNTER — APPOINTMENT (OUTPATIENT)
Dept: CARDIOLOGY | Facility: HOSPITAL | Age: 81
End: 2021-10-01

## 2021-10-01 PROBLEM — F03.90 DEMENTIA (HCC): Chronic | Status: ACTIVE | Noted: 2021-10-01

## 2021-10-01 PROBLEM — R63.6 UNDERWEIGHT: Status: ACTIVE | Noted: 2021-10-01

## 2021-10-01 PROBLEM — J44.1 COPD EXACERBATION (HCC): Status: ACTIVE | Noted: 2021-10-01

## 2021-10-01 LAB
ALBUMIN SERPL-MCNC: 2.6 G/DL (ref 3.5–5.2)
ALBUMIN SERPL-MCNC: 3.1 G/DL (ref 3.5–5.2)
ALBUMIN/GLOB SERPL: 1.1 G/DL
ALBUMIN/GLOB SERPL: 1.1 G/DL
ALP SERPL-CCNC: 100 U/L (ref 39–117)
ALP SERPL-CCNC: 133 U/L (ref 39–117)
ALT SERPL W P-5'-P-CCNC: 7 U/L (ref 1–33)
ALT SERPL W P-5'-P-CCNC: 8 U/L (ref 1–33)
AMORPH URATE CRY URNS QL MICRO: ABNORMAL /HPF
ANION GAP SERPL CALCULATED.3IONS-SCNC: 10 MMOL/L (ref 5–15)
ANION GAP SERPL CALCULATED.3IONS-SCNC: 11 MMOL/L (ref 5–15)
ANISOCYTOSIS BLD QL: ABNORMAL
ARTERIAL PATENCY WRIST A: POSITIVE
AST SERPL-CCNC: 15 U/L (ref 1–32)
AST SERPL-CCNC: 19 U/L (ref 1–32)
ATMOSPHERIC PRESS: ABNORMAL MM[HG]
ATMOSPHERIC PRESS: ABNORMAL MM[HG]
B PARAPERT DNA SPEC QL NAA+PROBE: NOT DETECTED
B PERT DNA SPEC QL NAA+PROBE: NOT DETECTED
BACTERIA UR QL AUTO: ABNORMAL /HPF
BASE EXCESS BLDA CALC-SCNC: 18.8 MMOL/L (ref 0–3)
BASE EXCESS BLDV CALC-SCNC: 14.3 MMOL/L (ref -2–2)
BDY SITE: ABNORMAL
BDY SITE: ABNORMAL
BH CV ECHO MEAS - ACS: 1.7 CM
BH CV ECHO MEAS - AO MAX PG (FULL): 10.3 MMHG
BH CV ECHO MEAS - AO MAX PG: 13.7 MMHG
BH CV ECHO MEAS - AO MEAN PG (FULL): 6 MMHG
BH CV ECHO MEAS - AO MEAN PG: 7.6 MMHG
BH CV ECHO MEAS - AO ROOT AREA (BSA CORRECTED): 2
BH CV ECHO MEAS - AO ROOT AREA: 6.2 CM^2
BH CV ECHO MEAS - AO ROOT DIAM: 2.8 CM
BH CV ECHO MEAS - AO V2 MAX: 185.4 CM/SEC
BH CV ECHO MEAS - AO V2 MEAN: 130 CM/SEC
BH CV ECHO MEAS - AO V2 VTI: 31.2 CM
BH CV ECHO MEAS - AORTIC HR: 99.8 BPM
BH CV ECHO MEAS - AORTIC R-R: 0.6 SEC
BH CV ECHO MEAS - AVA(I,A): 2 CM^2
BH CV ECHO MEAS - AVA(I,D): 2 CM^2
BH CV ECHO MEAS - AVA(V,A): 1.3 CM^2
BH CV ECHO MEAS - AVA(V,D): 1.3 CM^2
BH CV ECHO MEAS - BSA(HAYCOCK): 1.3 M^2
BH CV ECHO MEAS - BSA: 1.4 M^2
BH CV ECHO MEAS - BZI_BMI: 14.9 KILOGRAMS/M^2
BH CV ECHO MEAS - BZI_METRIC_HEIGHT: 162.6 CM
BH CV ECHO MEAS - BZI_METRIC_WEIGHT: 39.5 KG
BH CV ECHO MEAS - CI(AO): 14.1 L/MIN/M^2
BH CV ECHO MEAS - CI(LVOT): 4.4 L/MIN/M^2
BH CV ECHO MEAS - CO(AO): 19.3 L/MIN
BH CV ECHO MEAS - CO(LVOT): 6.1 L/MIN
BH CV ECHO MEAS - EDV(CUBED): 4.9 ML
BH CV ECHO MEAS - EDV(MOD-SP4): 50.4 ML
BH CV ECHO MEAS - EDV(TEICH): 8.3 ML
BH CV ECHO MEAS - EF(CUBED): 51.5 %
BH CV ECHO MEAS - EF(MOD-BP): 66 %
BH CV ECHO MEAS - EF(MOD-SP4): 65.5 %
BH CV ECHO MEAS - EF(TEICH): 46.8 %
BH CV ECHO MEAS - ESV(CUBED): 2.4 ML
BH CV ECHO MEAS - ESV(MOD-SP4): 17.4 ML
BH CV ECHO MEAS - ESV(TEICH): 4.4 ML
BH CV ECHO MEAS - FS: 21.5 %
BH CV ECHO MEAS - IVS/LVPW: 1
BH CV ECHO MEAS - IVSD: 1.1 CM
BH CV ECHO MEAS - LA DIMENSION(2D): 2.8 CM
BH CV ECHO MEAS - LV DIASTOLIC VOL/BSA (35-75): 36.7 ML/M^2
BH CV ECHO MEAS - LV MASS(C)D: 42.2 GRAMS
BH CV ECHO MEAS - LV MASS(C)DI: 30.7 GRAMS/M^2
BH CV ECHO MEAS - LV MAX PG: 3.5 MMHG
BH CV ECHO MEAS - LV MEAN PG: 1.6 MMHG
BH CV ECHO MEAS - LV SYSTOLIC VOL/BSA (12-30): 12.7 ML/M^2
BH CV ECHO MEAS - LV V1 MAX: 93 CM/SEC
BH CV ECHO MEAS - LV V1 MEAN: 59 CM/SEC
BH CV ECHO MEAS - LV V1 VTI: 24.2 CM
BH CV ECHO MEAS - LVIDD: 1.7 CM
BH CV ECHO MEAS - LVIDS: 1.3 CM
BH CV ECHO MEAS - LVOT AREA: 2.5 CM^2
BH CV ECHO MEAS - LVOT DIAM: 1.8 CM
BH CV ECHO MEAS - LVPWD: 1 CM
BH CV ECHO MEAS - MV A MAX VEL: 120.2 CM/SEC
BH CV ECHO MEAS - MV DEC SLOPE: 503.7 CM/SEC^2
BH CV ECHO MEAS - MV DEC TIME: 0.18 SEC
BH CV ECHO MEAS - MV E MAX VEL: 90.2 CM/SEC
BH CV ECHO MEAS - MV E/A: 0.75
BH CV ECHO MEAS - MV MAX PG: 7.2 MMHG
BH CV ECHO MEAS - MV MEAN PG: 4.3 MMHG
BH CV ECHO MEAS - MV V2 MAX: 134.4 CM/SEC
BH CV ECHO MEAS - MV V2 MEAN: 101.3 CM/SEC
BH CV ECHO MEAS - MV V2 VTI: 20 CM
BH CV ECHO MEAS - MVA(VTI): 3 CM^2
BH CV ECHO MEAS - PA MAX PG (FULL): 0.09 MMHG
BH CV ECHO MEAS - PA MAX PG: 3 MMHG
BH CV ECHO MEAS - PA V2 MAX: 86.4 CM/SEC
BH CV ECHO MEAS - PVA(V,A): 3 CM^2
BH CV ECHO MEAS - PVA(V,D): 3 CM^2
BH CV ECHO MEAS - QP/QS: 0.79
BH CV ECHO MEAS - RAP SYSTOLE: 3 MMHG
BH CV ECHO MEAS - RV MAX PG: 2.9 MMHG
BH CV ECHO MEAS - RV MEAN PG: 1.6 MMHG
BH CV ECHO MEAS - RV V1 MAX: 85 CM/SEC
BH CV ECHO MEAS - RV V1 MEAN: 60.1 CM/SEC
BH CV ECHO MEAS - RV V1 VTI: 15.8 CM
BH CV ECHO MEAS - RVDD: 2.7 CM
BH CV ECHO MEAS - RVOT AREA: 3.1 CM^2
BH CV ECHO MEAS - RVOT DIAM: 2 CM
BH CV ECHO MEAS - RVSP: 47.1 MMHG
BH CV ECHO MEAS - SI(AO): 141.1 ML/M^2
BH CV ECHO MEAS - SI(CUBED): 1.8 ML/M^2
BH CV ECHO MEAS - SI(LVOT): 44.5 ML/M^2
BH CV ECHO MEAS - SI(MOD-SP4): 24 ML/M^2
BH CV ECHO MEAS - SI(TEICH): 2.8 ML/M^2
BH CV ECHO MEAS - SV(AO): 193.8 ML
BH CV ECHO MEAS - SV(CUBED): 2.5 ML
BH CV ECHO MEAS - SV(LVOT): 61.1 ML
BH CV ECHO MEAS - SV(MOD-SP4): 33 ML
BH CV ECHO MEAS - SV(RVOT): 48.5 ML
BH CV ECHO MEAS - SV(TEICH): 3.9 ML
BH CV ECHO MEAS - TR MAX VEL: 330.9 CM/SEC
BILIRUB SERPL-MCNC: 0.6 MG/DL (ref 0–1.2)
BILIRUB SERPL-MCNC: 0.8 MG/DL (ref 0–1.2)
BILIRUB UR QL STRIP: ABNORMAL
BUN SERPL-MCNC: 33 MG/DL (ref 8–23)
BUN SERPL-MCNC: 39 MG/DL (ref 8–23)
BUN/CREAT SERPL: 25.8 (ref 7–25)
BUN/CREAT SERPL: 26.2 (ref 7–25)
C PNEUM DNA NPH QL NAA+NON-PROBE: NOT DETECTED
CALCIUM SPEC-SCNC: 7.1 MG/DL (ref 8.6–10.5)
CALCIUM SPEC-SCNC: 8.8 MG/DL (ref 8.6–10.5)
CHLORIDE SERPL-SCNC: 100 MMOL/L (ref 98–107)
CHLORIDE SERPL-SCNC: 94 MMOL/L (ref 98–107)
CHOLEST SERPL-MCNC: 152 MG/DL (ref 0–200)
CLARITY UR: ABNORMAL
CO2 BLDA-SCNC: 45.5 MMOL/L (ref 22–29)
CO2 BLDA-SCNC: >50 MMOL/L (ref 22–29)
CO2 SERPL-SCNC: 34 MMOL/L (ref 22–29)
CO2 SERPL-SCNC: 39 MMOL/L (ref 22–29)
COLOR UR: ABNORMAL
CREAT SERPL-MCNC: 1.28 MG/DL (ref 0.57–1)
CREAT SERPL-MCNC: 1.49 MG/DL (ref 0.57–1)
D-LACTATE SERPL-SCNC: 1.8 MMOL/L (ref 0.5–2)
D-LACTATE SERPL-SCNC: 1.9 MMOL/L (ref 0.5–2)
DEPRECATED RDW RBC AUTO: 52.1 FL (ref 37–54)
EOSINOPHIL # BLD MANUAL: 0.05 10*3/MM3 (ref 0–0.4)
EOSINOPHIL NFR BLD MANUAL: 1 % (ref 0.3–6.2)
ERYTHROCYTE [DISTWIDTH] IN BLOOD BY AUTOMATED COUNT: 15.3 % (ref 12.3–15.4)
FLUAV SUBTYP SPEC NAA+PROBE: NOT DETECTED
FLUBV RNA ISLT QL NAA+PROBE: NOT DETECTED
GFR SERPL CREATININE-BSD FRML MDRD: 34 ML/MIN/1.73
GFR SERPL CREATININE-BSD FRML MDRD: 40 ML/MIN/1.73
GGT SERPL-CCNC: 17 U/L (ref 5–36)
GLOBULIN UR ELPH-MCNC: 2.3 GM/DL
GLOBULIN UR ELPH-MCNC: 2.9 GM/DL
GLUCOSE BLDC GLUCOMTR-MCNC: 125 MG/DL (ref 70–105)
GLUCOSE BLDC GLUCOMTR-MCNC: 133 MG/DL (ref 70–105)
GLUCOSE BLDC GLUCOMTR-MCNC: 187 MG/DL (ref 70–105)
GLUCOSE BLDC GLUCOMTR-MCNC: 70 MG/DL (ref 70–105)
GLUCOSE SERPL-MCNC: 124 MG/DL (ref 65–99)
GLUCOSE SERPL-MCNC: 130 MG/DL (ref 65–99)
GLUCOSE UR STRIP-MCNC: NEGATIVE MG/DL
GRAN CASTS URNS QL MICRO: ABNORMAL /LPF
HADV DNA SPEC NAA+PROBE: NOT DETECTED
HBA1C MFR BLD: 4.7 % (ref 3.5–5.6)
HCO3 BLDA-SCNC: 51.9 MMOL/L (ref 21–28)
HCO3 BLDV-SCNC: 43.1 MMOL/L (ref 22–26)
HCOV 229E RNA SPEC QL NAA+PROBE: NOT DETECTED
HCOV HKU1 RNA SPEC QL NAA+PROBE: NOT DETECTED
HCOV NL63 RNA SPEC QL NAA+PROBE: NOT DETECTED
HCOV OC43 RNA SPEC QL NAA+PROBE: NOT DETECTED
HCT VFR BLD AUTO: 28.4 % (ref 34–46.6)
HDLC SERPL-MCNC: 44 MG/DL (ref 40–60)
HEMODILUTION: NO
HGB BLD-MCNC: 8.7 G/DL (ref 12–15.9)
HGB UR QL STRIP.AUTO: NEGATIVE
HMPV RNA NPH QL NAA+NON-PROBE: NOT DETECTED
HOLD SPECIMEN: NORMAL
HPIV1 RNA SPEC QL NAA+PROBE: NOT DETECTED
HPIV2 RNA SPEC QL NAA+PROBE: NOT DETECTED
HPIV3 RNA NPH QL NAA+PROBE: NOT DETECTED
HPIV4 P GENE NPH QL NAA+PROBE: NOT DETECTED
HYALINE CASTS UR QL AUTO: ABNORMAL /LPF
INHALED O2 CONCENTRATION: 100 %
INHALED O2 CONCENTRATION: 40 %
KETONES UR QL STRIP: NEGATIVE
L PNEUMO1 AG UR QL IA: NEGATIVE
LDLC SERPL CALC-MCNC: 81 MG/DL (ref 0–100)
LDLC/HDLC SERPL: 1.74 {RATIO}
LEUKOCYTE ESTERASE UR QL STRIP.AUTO: NEGATIVE
LYMPHOCYTES # BLD MANUAL: 0.59 10*3/MM3 (ref 0.7–3.1)
LYMPHOCYTES NFR BLD MANUAL: 12 % (ref 19.6–45.3)
LYMPHOCYTES NFR BLD MANUAL: 3 % (ref 5–12)
M PNEUMO IGG SER IA-ACNC: NOT DETECTED
MAGNESIUM SERPL-MCNC: 1.4 MG/DL (ref 1.6–2.4)
MAGNESIUM SERPL-MCNC: 1.5 MG/DL (ref 1.6–2.4)
MCH RBC QN AUTO: 29.3 PG (ref 26.6–33)
MCHC RBC AUTO-ENTMCNC: 30.7 G/DL (ref 31.5–35.7)
MCV RBC AUTO: 95.6 FL (ref 79–97)
METAMYELOCYTES NFR BLD MANUAL: 8 % (ref 0–0)
MODALITY: ABNORMAL
MODALITY: ABNORMAL
MONOCYTES # BLD AUTO: 0.15 10*3/MM3 (ref 0.1–0.9)
MRSA DNA SPEC QL NAA+PROBE: NORMAL
NEUTROPHILS # BLD AUTO: 3.72 10*3/MM3 (ref 1.7–7)
NEUTROPHILS NFR BLD MANUAL: 40 % (ref 42.7–76)
NEUTS BAND NFR BLD MANUAL: 36 % (ref 0–5)
NEUTS VAC BLD QL SMEAR: ABNORMAL
NITRITE UR QL STRIP: NEGATIVE
NT-PROBNP SERPL-MCNC: ABNORMAL PG/ML (ref 0–1800)
PCO2 BLDA: 136 MM HG (ref 35–48)
PCO2 BLDV: 79.3 MM HG (ref 42–51)
PEEP RESPIRATORY: 5 CM[H2O]
PH BLDA: 7.19 PH UNITS (ref 7.35–7.45)
PH BLDV: 7.34 PH UNITS (ref 7.32–7.43)
PH UR STRIP.AUTO: <=5 [PH] (ref 5–8)
PHOSPHATE SERPL-MCNC: 4.2 MG/DL (ref 2.5–4.5)
PHOSPHATE SERPL-MCNC: 4.5 MG/DL (ref 2.5–4.5)
PLAT MORPH BLD: NORMAL
PLATELET # BLD AUTO: 184 10*3/MM3 (ref 140–450)
PMV BLD AUTO: 7.2 FL (ref 6–12)
PO2 BLDA: 294.4 MM HG (ref 83–108)
PO2 BLDV: 29.3 MM HG (ref 40–42)
POTASSIUM SERPL-SCNC: 3.4 MMOL/L (ref 3.5–5.2)
POTASSIUM SERPL-SCNC: 4.4 MMOL/L (ref 3.5–5.2)
PROCALCITONIN SERPL-MCNC: 2.21 NG/ML (ref 0–0.25)
PROT SERPL-MCNC: 4.9 G/DL (ref 6–8.5)
PROT SERPL-MCNC: 6 G/DL (ref 6–8.5)
PROT UR QL STRIP: ABNORMAL
RBC # BLD AUTO: 2.97 10*6/MM3 (ref 3.77–5.28)
RBC # UR: ABNORMAL /HPF
REF LAB TEST METHOD: ABNORMAL
RESPIRATORY RATE: 16
RHINOVIRUS RNA SPEC NAA+PROBE: NOT DETECTED
RSV RNA NPH QL NAA+NON-PROBE: NOT DETECTED
S PNEUM AG SPEC QL LA: NEGATIVE
SAO2 % BLDCOA: 99.8 % (ref 94–98)
SAO2 % BLDCOV: 47.9 % (ref 95–99)
SARS-COV-2 RNA NPH QL NAA+NON-PROBE: NOT DETECTED
SCAN SLIDE: NORMAL
SODIUM SERPL-SCNC: 143 MMOL/L (ref 136–145)
SODIUM SERPL-SCNC: 145 MMOL/L (ref 136–145)
SP GR UR STRIP: 1.02 (ref 1–1.03)
SQUAMOUS #/AREA URNS HPF: ABNORMAL /HPF
TOXIC GRANULATION: ABNORMAL
TRIGL SERPL-MCNC: 158 MG/DL (ref 0–150)
TROPONIN T SERPL-MCNC: 0.05 NG/ML (ref 0–0.03)
TROPONIN T SERPL-MCNC: 0.08 NG/ML (ref 0–0.03)
TSH SERPL DL<=0.05 MIU/L-ACNC: 0.59 UIU/ML (ref 0.27–4.2)
UROBILINOGEN UR QL STRIP: ABNORMAL
VENTILATOR MODE: ABNORMAL
VLDLC SERPL-MCNC: 27 MG/DL (ref 5–40)
VT ON VENT VENT: 550 ML
WBC # BLD AUTO: 4.9 10*3/MM3 (ref 3.4–10.8)
WBC UR QL AUTO: ABNORMAL /HPF

## 2021-10-01 PROCEDURE — 84443 ASSAY THYROID STIM HORMONE: CPT | Performed by: STUDENT IN AN ORGANIZED HEALTH CARE EDUCATION/TRAINING PROGRAM

## 2021-10-01 PROCEDURE — 94799 UNLISTED PULMONARY SVC/PX: CPT

## 2021-10-01 PROCEDURE — 80053 COMPREHEN METABOLIC PANEL: CPT | Performed by: STUDENT IN AN ORGANIZED HEALTH CARE EDUCATION/TRAINING PROGRAM

## 2021-10-01 PROCEDURE — 82962 GLUCOSE BLOOD TEST: CPT

## 2021-10-01 PROCEDURE — 93306 TTE W/DOPPLER COMPLETE: CPT

## 2021-10-01 PROCEDURE — 36600 WITHDRAWAL OF ARTERIAL BLOOD: CPT

## 2021-10-01 PROCEDURE — 83880 ASSAY OF NATRIURETIC PEPTIDE: CPT | Performed by: STUDENT IN AN ORGANIZED HEALTH CARE EDUCATION/TRAINING PROGRAM

## 2021-10-01 PROCEDURE — 83735 ASSAY OF MAGNESIUM: CPT | Performed by: STUDENT IN AN ORGANIZED HEALTH CARE EDUCATION/TRAINING PROGRAM

## 2021-10-01 PROCEDURE — P9041 ALBUMIN (HUMAN),5%, 50ML: HCPCS | Performed by: NURSE PRACTITIONER

## 2021-10-01 PROCEDURE — 82977 ASSAY OF GGT: CPT | Performed by: STUDENT IN AN ORGANIZED HEALTH CARE EDUCATION/TRAINING PROGRAM

## 2021-10-01 PROCEDURE — 25010000002 HEPARIN (PORCINE) PER 1000 UNITS: Performed by: STUDENT IN AN ORGANIZED HEALTH CARE EDUCATION/TRAINING PROGRAM

## 2021-10-01 PROCEDURE — 82803 BLOOD GASES ANY COMBINATION: CPT

## 2021-10-01 PROCEDURE — 25010000002 DIGOXIN PER 500 MCG: Performed by: INTERNAL MEDICINE

## 2021-10-01 PROCEDURE — 71045 X-RAY EXAM CHEST 1 VIEW: CPT

## 2021-10-01 PROCEDURE — 87641 MR-STAPH DNA AMP PROBE: CPT | Performed by: INTERNAL MEDICINE

## 2021-10-01 PROCEDURE — 25010000002 VANCOMYCIN 750 MG RECONSTITUTED SOLUTION 1 EACH VIAL: Performed by: STUDENT IN AN ORGANIZED HEALTH CARE EDUCATION/TRAINING PROGRAM

## 2021-10-01 PROCEDURE — 81001 URINALYSIS AUTO W/SCOPE: CPT | Performed by: STUDENT IN AN ORGANIZED HEALTH CARE EDUCATION/TRAINING PROGRAM

## 2021-10-01 PROCEDURE — 25010000002 LEVOFLOXACIN PER 250 MG: Performed by: STUDENT IN AN ORGANIZED HEALTH CARE EDUCATION/TRAINING PROGRAM

## 2021-10-01 PROCEDURE — 25010000002 ALBUMIN HUMAN 5% PER 50 ML: Performed by: NURSE PRACTITIONER

## 2021-10-01 PROCEDURE — 87899 AGENT NOS ASSAY W/OPTIC: CPT | Performed by: STUDENT IN AN ORGANIZED HEALTH CARE EDUCATION/TRAINING PROGRAM

## 2021-10-01 PROCEDURE — 85025 COMPLETE CBC W/AUTO DIFF WBC: CPT | Performed by: STUDENT IN AN ORGANIZED HEALTH CARE EDUCATION/TRAINING PROGRAM

## 2021-10-01 PROCEDURE — 76775 US EXAM ABDO BACK WALL LIM: CPT

## 2021-10-01 PROCEDURE — 87147 CULTURE TYPE IMMUNOLOGIC: CPT | Performed by: STUDENT IN AN ORGANIZED HEALTH CARE EDUCATION/TRAINING PROGRAM

## 2021-10-01 PROCEDURE — 84484 ASSAY OF TROPONIN QUANT: CPT | Performed by: STUDENT IN AN ORGANIZED HEALTH CARE EDUCATION/TRAINING PROGRAM

## 2021-10-01 PROCEDURE — 93306 TTE W/DOPPLER COMPLETE: CPT | Performed by: INTERNAL MEDICINE

## 2021-10-01 PROCEDURE — 83036 HEMOGLOBIN GLYCOSYLATED A1C: CPT | Performed by: STUDENT IN AN ORGANIZED HEALTH CARE EDUCATION/TRAINING PROGRAM

## 2021-10-01 PROCEDURE — 25010000002 MAGNESIUM SULFATE 2 GM/50ML SOLUTION: Performed by: INTERNAL MEDICINE

## 2021-10-01 PROCEDURE — 25010000002 METHYLPREDNISOLONE PER 125 MG: Performed by: INTERNAL MEDICINE

## 2021-10-01 PROCEDURE — 99222 1ST HOSP IP/OBS MODERATE 55: CPT | Performed by: INTERNAL MEDICINE

## 2021-10-01 PROCEDURE — 80061 LIPID PANEL: CPT | Performed by: STUDENT IN AN ORGANIZED HEALTH CARE EDUCATION/TRAINING PROGRAM

## 2021-10-01 PROCEDURE — 87040 BLOOD CULTURE FOR BACTERIA: CPT | Performed by: STUDENT IN AN ORGANIZED HEALTH CARE EDUCATION/TRAINING PROGRAM

## 2021-10-01 PROCEDURE — 84100 ASSAY OF PHOSPHORUS: CPT | Performed by: STUDENT IN AN ORGANIZED HEALTH CARE EDUCATION/TRAINING PROGRAM

## 2021-10-01 PROCEDURE — 85007 BL SMEAR W/DIFF WBC COUNT: CPT | Performed by: STUDENT IN AN ORGANIZED HEALTH CARE EDUCATION/TRAINING PROGRAM

## 2021-10-01 PROCEDURE — 87150 DNA/RNA AMPLIFIED PROBE: CPT | Performed by: STUDENT IN AN ORGANIZED HEALTH CARE EDUCATION/TRAINING PROGRAM

## 2021-10-01 PROCEDURE — 83605 ASSAY OF LACTIC ACID: CPT | Performed by: STUDENT IN AN ORGANIZED HEALTH CARE EDUCATION/TRAINING PROGRAM

## 2021-10-01 PROCEDURE — 84145 PROCALCITONIN (PCT): CPT | Performed by: STUDENT IN AN ORGANIZED HEALTH CARE EDUCATION/TRAINING PROGRAM

## 2021-10-01 RX ORDER — PANTOPRAZOLE SODIUM 40 MG/10ML
40 INJECTION, POWDER, LYOPHILIZED, FOR SOLUTION INTRAVENOUS
Status: DISCONTINUED | OUTPATIENT
Start: 2021-10-01 | End: 2021-10-02

## 2021-10-01 RX ORDER — ARFORMOTEROL TARTRATE 15 UG/2ML
15 SOLUTION RESPIRATORY (INHALATION)
Status: DISCONTINUED | OUTPATIENT
Start: 2021-10-01 | End: 2021-10-06

## 2021-10-01 RX ORDER — DIGOXIN 0.25 MG/ML
250 INJECTION INTRAMUSCULAR; INTRAVENOUS ONCE
Status: COMPLETED | OUTPATIENT
Start: 2021-10-01 | End: 2021-10-01

## 2021-10-01 RX ORDER — LEVOFLOXACIN 5 MG/ML
750 INJECTION, SOLUTION INTRAVENOUS ONCE
Status: DISCONTINUED | OUTPATIENT
Start: 2021-10-01 | End: 2021-10-01

## 2021-10-01 RX ORDER — ALBUMIN, HUMAN INJ 5% 5 %
250 SOLUTION INTRAVENOUS ONCE
Status: COMPLETED | OUTPATIENT
Start: 2021-10-01 | End: 2021-10-01

## 2021-10-01 RX ORDER — LEVOFLOXACIN 5 MG/ML
750 INJECTION, SOLUTION INTRAVENOUS
Status: DISCONTINUED | OUTPATIENT
Start: 2021-10-01 | End: 2021-10-01

## 2021-10-01 RX ORDER — METHYLPREDNISOLONE SODIUM SUCCINATE 125 MG/2ML
60 INJECTION, POWDER, LYOPHILIZED, FOR SOLUTION INTRAMUSCULAR; INTRAVENOUS EVERY 8 HOURS
Status: DISCONTINUED | OUTPATIENT
Start: 2021-10-01 | End: 2021-10-04

## 2021-10-01 RX ORDER — ACETYLCYSTEINE 200 MG/ML
2 SOLUTION ORAL; RESPIRATORY (INHALATION)
Status: DISCONTINUED | OUTPATIENT
Start: 2021-10-01 | End: 2021-10-09 | Stop reason: HOSPADM

## 2021-10-01 RX ORDER — IPRATROPIUM BROMIDE AND ALBUTEROL SULFATE 2.5; .5 MG/3ML; MG/3ML
3 SOLUTION RESPIRATORY (INHALATION)
Status: DISCONTINUED | OUTPATIENT
Start: 2021-10-01 | End: 2021-10-09 | Stop reason: HOSPADM

## 2021-10-01 RX ORDER — DIGOXIN 125 MCG
125 TABLET ORAL
Status: DISCONTINUED | OUTPATIENT
Start: 2021-10-02 | End: 2021-10-09 | Stop reason: HOSPADM

## 2021-10-01 RX ORDER — ACETYLCYSTEINE 200 MG/ML
2 SOLUTION ORAL; RESPIRATORY (INHALATION)
Status: DISCONTINUED | OUTPATIENT
Start: 2021-10-01 | End: 2021-10-01

## 2021-10-01 RX ORDER — BUDESONIDE 0.5 MG/2ML
1 INHALANT ORAL
Status: DISCONTINUED | OUTPATIENT
Start: 2021-10-01 | End: 2021-10-09 | Stop reason: HOSPADM

## 2021-10-01 RX ORDER — MAGNESIUM SULFATE HEPTAHYDRATE 40 MG/ML
2 INJECTION, SOLUTION INTRAVENOUS ONCE
Status: COMPLETED | OUTPATIENT
Start: 2021-10-01 | End: 2021-10-01

## 2021-10-01 RX ORDER — SODIUM CHLORIDE 9 MG/ML
50 INJECTION, SOLUTION INTRAVENOUS CONTINUOUS
Status: DISCONTINUED | OUTPATIENT
Start: 2021-10-01 | End: 2021-10-04

## 2021-10-01 RX ADMIN — PANTOPRAZOLE SODIUM 40 MG: 40 INJECTION, POWDER, FOR SOLUTION INTRAVENOUS at 06:35

## 2021-10-01 RX ADMIN — BUDESONIDE 1 MG: 0.5 SUSPENSION RESPIRATORY (INHALATION) at 19:49

## 2021-10-01 RX ADMIN — HEPARIN SODIUM 5000 UNITS: 5000 INJECTION INTRAVENOUS; SUBCUTANEOUS at 23:14

## 2021-10-01 RX ADMIN — IPRATROPIUM BROMIDE AND ALBUTEROL SULFATE 3 ML: 2.5; .5 SOLUTION RESPIRATORY (INHALATION) at 07:15

## 2021-10-01 RX ADMIN — METHYLPREDNISOLONE SODIUM SUCCINATE 60 MG: 125 INJECTION, POWDER, FOR SOLUTION INTRAMUSCULAR; INTRAVENOUS at 20:48

## 2021-10-01 RX ADMIN — BUDESONIDE 0.5 MG: 0.5 SUSPENSION RESPIRATORY (INHALATION) at 07:19

## 2021-10-01 RX ADMIN — HEPARIN SODIUM 5000 UNITS: 5000 INJECTION INTRAVENOUS; SUBCUTANEOUS at 20:47

## 2021-10-01 RX ADMIN — IPRATROPIUM BROMIDE AND ALBUTEROL SULFATE 3 ML: 2.5; .5 SOLUTION RESPIRATORY (INHALATION) at 10:52

## 2021-10-01 RX ADMIN — DOXYCYCLINE 100 MG: 100 INJECTION, POWDER, LYOPHILIZED, FOR SOLUTION INTRAVENOUS at 20:47

## 2021-10-01 RX ADMIN — DOXYCYCLINE 100 MG: 100 INJECTION, POWDER, LYOPHILIZED, FOR SOLUTION INTRAVENOUS at 11:20

## 2021-10-01 RX ADMIN — Medication 10 ML: at 20:30

## 2021-10-01 RX ADMIN — DIGOXIN 250 MCG: 250 INJECTION, SOLUTION INTRAMUSCULAR; INTRAVENOUS at 18:44

## 2021-10-01 RX ADMIN — ACETYLCYSTEINE 2 ML: 200 SOLUTION ORAL; RESPIRATORY (INHALATION) at 19:49

## 2021-10-01 RX ADMIN — SODIUM CHLORIDE 100 ML/HR: 9 INJECTION, SOLUTION INTRAVENOUS at 01:18

## 2021-10-01 RX ADMIN — IPRATROPIUM BROMIDE AND ALBUTEROL SULFATE 3 ML: 2.5; .5 SOLUTION RESPIRATORY (INHALATION) at 14:59

## 2021-10-01 RX ADMIN — ALBUMIN (HUMAN) 250 ML: 12.5 INJECTION, SOLUTION INTRAVENOUS at 23:13

## 2021-10-01 RX ADMIN — VANCOMYCIN HYDROCHLORIDE 750 MG: 750 INJECTION, POWDER, LYOPHILIZED, FOR SOLUTION INTRAVENOUS at 04:48

## 2021-10-01 RX ADMIN — Medication 10 ML: at 01:17

## 2021-10-01 RX ADMIN — Medication 10 ML: at 09:37

## 2021-10-01 RX ADMIN — IPRATROPIUM BROMIDE AND ALBUTEROL SULFATE 3 ML: 2.5; .5 SOLUTION RESPIRATORY (INHALATION) at 19:49

## 2021-10-01 RX ADMIN — HEPARIN SODIUM 5000 UNITS: 5000 INJECTION INTRAVENOUS; SUBCUTANEOUS at 15:40

## 2021-10-01 RX ADMIN — IPRATROPIUM BROMIDE AND ALBUTEROL SULFATE 3 ML: 2.5; .5 SOLUTION RESPIRATORY (INHALATION) at 23:36

## 2021-10-01 RX ADMIN — LEVOFLOXACIN 750 MG: 5 INJECTION, SOLUTION INTRAVENOUS at 06:02

## 2021-10-01 RX ADMIN — HEPARIN SODIUM 5000 UNITS: 5000 INJECTION INTRAVENOUS; SUBCUTANEOUS at 06:35

## 2021-10-01 RX ADMIN — METHYLPREDNISOLONE SODIUM SUCCINATE 60 MG: 125 INJECTION, POWDER, FOR SOLUTION INTRAMUSCULAR; INTRAVENOUS at 11:20

## 2021-10-01 RX ADMIN — MAGNESIUM SULFATE HEPTAHYDRATE 2 G: 2 INJECTION, SOLUTION INTRAVENOUS at 11:20

## 2021-10-02 LAB
ALBUMIN SERPL-MCNC: 3.2 G/DL (ref 3.5–5.2)
ALBUMIN/GLOB SERPL: 1.3 G/DL
ALP SERPL-CCNC: 119 U/L (ref 39–117)
ALT SERPL W P-5'-P-CCNC: 10 U/L (ref 1–33)
ANION GAP SERPL CALCULATED.3IONS-SCNC: 12 MMOL/L (ref 5–15)
AST SERPL-CCNC: 19 U/L (ref 1–32)
BACTERIA BLD CULT: ABNORMAL
BASOPHILS # BLD AUTO: 0 10*3/MM3 (ref 0–0.2)
BASOPHILS NFR BLD AUTO: 0.2 % (ref 0–1.5)
BILIRUB SERPL-MCNC: 0.7 MG/DL (ref 0–1.2)
BOTTLE TYPE: ABNORMAL
BUN SERPL-MCNC: 39 MG/DL (ref 8–23)
BUN/CREAT SERPL: 41.1 (ref 7–25)
CALCIUM SPEC-SCNC: 8.5 MG/DL (ref 8.6–10.5)
CHLORIDE SERPL-SCNC: 95 MMOL/L (ref 98–107)
CO2 SERPL-SCNC: 31 MMOL/L (ref 22–29)
CREAT SERPL-MCNC: 0.95 MG/DL (ref 0.57–1)
CRP SERPL-MCNC: 24.13 MG/DL (ref 0–0.5)
D-LACTATE SERPL-SCNC: 1.1 MMOL/L (ref 0.5–2)
D-LACTATE SERPL-SCNC: 1.4 MMOL/L (ref 0.5–2)
DEPRECATED RDW RBC AUTO: 47.7 FL (ref 37–54)
EOSINOPHIL # BLD AUTO: 0 10*3/MM3 (ref 0–0.4)
EOSINOPHIL NFR BLD AUTO: 0 % (ref 0.3–6.2)
ERYTHROCYTE [DISTWIDTH] IN BLOOD BY AUTOMATED COUNT: 14.8 % (ref 12.3–15.4)
GFR SERPL CREATININE-BSD FRML MDRD: 56 ML/MIN/1.73
GLOBULIN UR ELPH-MCNC: 2.4 GM/DL
GLUCOSE BLDC GLUCOMTR-MCNC: 118 MG/DL (ref 70–105)
GLUCOSE BLDC GLUCOMTR-MCNC: 151 MG/DL (ref 70–105)
GLUCOSE BLDC GLUCOMTR-MCNC: 163 MG/DL (ref 70–105)
GLUCOSE BLDC GLUCOMTR-MCNC: 263 MG/DL (ref 70–105)
GLUCOSE SERPL-MCNC: 92 MG/DL (ref 65–99)
HCT VFR BLD AUTO: 26.1 % (ref 34–46.6)
HGB BLD-MCNC: 8.5 G/DL (ref 12–15.9)
LYMPHOCYTES # BLD AUTO: 0.6 10*3/MM3 (ref 0.7–3.1)
LYMPHOCYTES NFR BLD AUTO: 12.2 % (ref 19.6–45.3)
MAGNESIUM SERPL-MCNC: 1.8 MG/DL (ref 1.6–2.4)
MCH RBC QN AUTO: 29.8 PG (ref 26.6–33)
MCHC RBC AUTO-ENTMCNC: 32.4 G/DL (ref 31.5–35.7)
MCV RBC AUTO: 91.9 FL (ref 79–97)
MONOCYTES # BLD AUTO: 0.4 10*3/MM3 (ref 0.1–0.9)
MONOCYTES NFR BLD AUTO: 8.2 % (ref 5–12)
NEUTROPHILS NFR BLD AUTO: 4.2 10*3/MM3 (ref 1.7–7)
NEUTROPHILS NFR BLD AUTO: 79.4 % (ref 42.7–76)
NRBC BLD AUTO-RTO: 0.2 /100 WBC (ref 0–0.2)
PHOSPHATE SERPL-MCNC: 1.4 MG/DL (ref 2.5–4.5)
PLATELET # BLD AUTO: 194 10*3/MM3 (ref 140–450)
PMV BLD AUTO: 7.3 FL (ref 6–12)
POTASSIUM SERPL-SCNC: 3.6 MMOL/L (ref 3.5–5.2)
POTASSIUM SERPL-SCNC: 4.4 MMOL/L (ref 3.5–5.2)
PROCALCITONIN SERPL-MCNC: 1.27 NG/ML (ref 0–0.25)
PROT SERPL-MCNC: 5.6 G/DL (ref 6–8.5)
RBC # BLD AUTO: 2.84 10*6/MM3 (ref 3.77–5.28)
SODIUM SERPL-SCNC: 138 MMOL/L (ref 136–145)
TROPONIN T SERPL-MCNC: 0.03 NG/ML (ref 0–0.03)
WBC # BLD AUTO: 5.3 10*3/MM3 (ref 3.4–10.8)

## 2021-10-02 PROCEDURE — 94799 UNLISTED PULMONARY SVC/PX: CPT

## 2021-10-02 PROCEDURE — 84484 ASSAY OF TROPONIN QUANT: CPT | Performed by: NURSE PRACTITIONER

## 2021-10-02 PROCEDURE — 84100 ASSAY OF PHOSPHORUS: CPT | Performed by: STUDENT IN AN ORGANIZED HEALTH CARE EDUCATION/TRAINING PROGRAM

## 2021-10-02 PROCEDURE — 25010000002 METHYLPREDNISOLONE PER 125 MG: Performed by: INTERNAL MEDICINE

## 2021-10-02 PROCEDURE — 94660 CPAP INITIATION&MGMT: CPT

## 2021-10-02 PROCEDURE — 83605 ASSAY OF LACTIC ACID: CPT | Performed by: STUDENT IN AN ORGANIZED HEALTH CARE EDUCATION/TRAINING PROGRAM

## 2021-10-02 PROCEDURE — 83735 ASSAY OF MAGNESIUM: CPT | Performed by: STUDENT IN AN ORGANIZED HEALTH CARE EDUCATION/TRAINING PROGRAM

## 2021-10-02 PROCEDURE — 63710000001 INSULIN LISPRO (HUMAN) PER 5 UNITS: Performed by: STUDENT IN AN ORGANIZED HEALTH CARE EDUCATION/TRAINING PROGRAM

## 2021-10-02 PROCEDURE — 85025 COMPLETE CBC W/AUTO DIFF WBC: CPT | Performed by: STUDENT IN AN ORGANIZED HEALTH CARE EDUCATION/TRAINING PROGRAM

## 2021-10-02 PROCEDURE — 82962 GLUCOSE BLOOD TEST: CPT

## 2021-10-02 PROCEDURE — 84145 PROCALCITONIN (PCT): CPT | Performed by: NURSE PRACTITIONER

## 2021-10-02 PROCEDURE — 25010000002 HEPARIN (PORCINE) PER 1000 UNITS: Performed by: STUDENT IN AN ORGANIZED HEALTH CARE EDUCATION/TRAINING PROGRAM

## 2021-10-02 PROCEDURE — 99222 1ST HOSP IP/OBS MODERATE 55: CPT | Performed by: HOSPITALIST

## 2021-10-02 PROCEDURE — 86140 C-REACTIVE PROTEIN: CPT | Performed by: NURSE PRACTITIONER

## 2021-10-02 PROCEDURE — 25010000002 MAGNESIUM SULFATE IN D5W 1G/100ML (PREMIX) 1-5 GM/100ML-% SOLUTION: Performed by: STUDENT IN AN ORGANIZED HEALTH CARE EDUCATION/TRAINING PROGRAM

## 2021-10-02 PROCEDURE — 84132 ASSAY OF SERUM POTASSIUM: CPT | Performed by: INTERNAL MEDICINE

## 2021-10-02 PROCEDURE — 25010000002 DIGOXIN PER 500 MCG: Performed by: STUDENT IN AN ORGANIZED HEALTH CARE EDUCATION/TRAINING PROGRAM

## 2021-10-02 PROCEDURE — 80053 COMPREHEN METABOLIC PANEL: CPT | Performed by: STUDENT IN AN ORGANIZED HEALTH CARE EDUCATION/TRAINING PROGRAM

## 2021-10-02 RX ORDER — POTASSIUM CHLORIDE 1.5 G/1.77G
40 POWDER, FOR SOLUTION ORAL AS NEEDED
Status: DISCONTINUED | OUTPATIENT
Start: 2021-10-02 | End: 2021-10-09 | Stop reason: HOSPADM

## 2021-10-02 RX ORDER — MAGNESIUM SULFATE HEPTAHYDRATE 40 MG/ML
2 INJECTION, SOLUTION INTRAVENOUS AS NEEDED
Status: DISCONTINUED | OUTPATIENT
Start: 2021-10-02 | End: 2021-10-09 | Stop reason: HOSPADM

## 2021-10-02 RX ORDER — FENTANYL/ROPIVACAINE/NS/PF 2-625MCG/1
15 PLASTIC BAG, INJECTION (ML) EPIDURAL AS NEEDED
Status: DISCONTINUED | OUTPATIENT
Start: 2021-10-02 | End: 2021-10-09 | Stop reason: HOSPADM

## 2021-10-02 RX ORDER — LEVOFLOXACIN 5 MG/ML
750 INJECTION, SOLUTION INTRAVENOUS
Status: DISCONTINUED | OUTPATIENT
Start: 2021-10-03 | End: 2021-10-03

## 2021-10-02 RX ORDER — PANTOPRAZOLE SODIUM 40 MG/1
40 TABLET, DELAYED RELEASE ORAL
Status: DISCONTINUED | OUTPATIENT
Start: 2021-10-03 | End: 2021-10-09 | Stop reason: HOSPADM

## 2021-10-02 RX ORDER — POTASSIUM CHLORIDE 20 MEQ/1
40 TABLET, EXTENDED RELEASE ORAL AS NEEDED
Status: DISCONTINUED | OUTPATIENT
Start: 2021-10-02 | End: 2021-10-09 | Stop reason: HOSPADM

## 2021-10-02 RX ORDER — INSULIN LISPRO 100 [IU]/ML
0-7 INJECTION, SOLUTION INTRAVENOUS; SUBCUTANEOUS AS NEEDED
Status: DISCONTINUED | OUTPATIENT
Start: 2021-10-03 | End: 2021-10-09 | Stop reason: HOSPADM

## 2021-10-02 RX ORDER — POTASSIUM CHLORIDE 7.45 MG/ML
10 INJECTION INTRAVENOUS
Status: DISCONTINUED | OUTPATIENT
Start: 2021-10-02 | End: 2021-10-09 | Stop reason: HOSPADM

## 2021-10-02 RX ORDER — INSULIN LISPRO 100 [IU]/ML
0-7 INJECTION, SOLUTION INTRAVENOUS; SUBCUTANEOUS
Status: DISCONTINUED | OUTPATIENT
Start: 2021-10-03 | End: 2021-10-09 | Stop reason: HOSPADM

## 2021-10-02 RX ORDER — MAGNESIUM SULFATE 1 G/100ML
1 INJECTION INTRAVENOUS AS NEEDED
Status: DISCONTINUED | OUTPATIENT
Start: 2021-10-02 | End: 2021-10-09 | Stop reason: HOSPADM

## 2021-10-02 RX ORDER — DIGOXIN 0.25 MG/ML
250 INJECTION INTRAMUSCULAR; INTRAVENOUS ONCE
Status: COMPLETED | OUTPATIENT
Start: 2021-10-02 | End: 2021-10-02

## 2021-10-02 RX ADMIN — METHYLPREDNISOLONE SODIUM SUCCINATE 60 MG: 125 INJECTION, POWDER, FOR SOLUTION INTRAMUSCULAR; INTRAVENOUS at 05:40

## 2021-10-02 RX ADMIN — Medication 10 ML: at 21:56

## 2021-10-02 RX ADMIN — Medication 10 ML: at 09:11

## 2021-10-02 RX ADMIN — POTASSIUM CHLORIDE 40 MEQ: 1500 TABLET, EXTENDED RELEASE ORAL at 15:45

## 2021-10-02 RX ADMIN — IPRATROPIUM BROMIDE AND ALBUTEROL SULFATE 3 ML: 2.5; .5 SOLUTION RESPIRATORY (INHALATION) at 20:34

## 2021-10-02 RX ADMIN — BUDESONIDE 0.5 MG: 0.5 SUSPENSION RESPIRATORY (INHALATION) at 08:20

## 2021-10-02 RX ADMIN — BUDESONIDE 1 MG: 0.5 SUSPENSION RESPIRATORY (INHALATION) at 20:33

## 2021-10-02 RX ADMIN — METHYLPREDNISOLONE SODIUM SUCCINATE 60 MG: 125 INJECTION, POWDER, FOR SOLUTION INTRAMUSCULAR; INTRAVENOUS at 21:54

## 2021-10-02 RX ADMIN — POTASSIUM CHLORIDE 40 MEQ: 1500 TABLET, EXTENDED RELEASE ORAL at 09:10

## 2021-10-02 RX ADMIN — POTASSIUM PHOSPHATE, MONOBASIC AND POTASSIUM PHOSPHATE, DIBASIC 15 MMOL: 224; 236 INJECTION, SOLUTION, CONCENTRATE INTRAVENOUS at 09:31

## 2021-10-02 RX ADMIN — METHYLPREDNISOLONE SODIUM SUCCINATE 60 MG: 125 INJECTION, POWDER, FOR SOLUTION INTRAMUSCULAR; INTRAVENOUS at 12:39

## 2021-10-02 RX ADMIN — HEPARIN SODIUM 5000 UNITS: 5000 INJECTION INTRAVENOUS; SUBCUTANEOUS at 21:54

## 2021-10-02 RX ADMIN — HEPARIN SODIUM 5000 UNITS: 5000 INJECTION INTRAVENOUS; SUBCUTANEOUS at 17:59

## 2021-10-02 RX ADMIN — HEPARIN SODIUM 5000 UNITS: 5000 INJECTION INTRAVENOUS; SUBCUTANEOUS at 06:00

## 2021-10-02 RX ADMIN — ARFORMOTEROL TARTRATE 15 MCG: 15 SOLUTION RESPIRATORY (INHALATION) at 20:34

## 2021-10-02 RX ADMIN — ACETYLCYSTEINE 2 ML: 200 SOLUTION ORAL; RESPIRATORY (INHALATION) at 08:24

## 2021-10-02 RX ADMIN — INSULIN LISPRO 4 UNITS: 100 INJECTION, SOLUTION INTRAVENOUS; SUBCUTANEOUS at 01:57

## 2021-10-02 RX ADMIN — DOXYCYCLINE 100 MG: 100 INJECTION, POWDER, LYOPHILIZED, FOR SOLUTION INTRAVENOUS at 09:11

## 2021-10-02 RX ADMIN — MAGNESIUM SULFATE IN DEXTROSE 1 G: 10 INJECTION, SOLUTION INTRAVENOUS at 09:11

## 2021-10-02 RX ADMIN — IPRATROPIUM BROMIDE AND ALBUTEROL SULFATE 3 ML: 2.5; .5 SOLUTION RESPIRATORY (INHALATION) at 04:36

## 2021-10-02 RX ADMIN — ACETYLCYSTEINE 2 ML: 200 SOLUTION ORAL; RESPIRATORY (INHALATION) at 20:34

## 2021-10-02 RX ADMIN — PANTOPRAZOLE SODIUM 40 MG: 40 INJECTION, POWDER, FOR SOLUTION INTRAVENOUS at 05:41

## 2021-10-02 RX ADMIN — DIGOXIN 125 MCG: 125 TABLET ORAL at 12:38

## 2021-10-02 RX ADMIN — DIGOXIN 250 MCG: 250 INJECTION, SOLUTION INTRAMUSCULAR; INTRAVENOUS at 07:18

## 2021-10-02 RX ADMIN — INSULIN LISPRO 2 UNITS: 100 INJECTION, SOLUTION INTRAVENOUS; SUBCUTANEOUS at 12:38

## 2021-10-02 RX ADMIN — ARFORMOTEROL TARTRATE 15 MCG: 15 SOLUTION RESPIRATORY (INHALATION) at 08:20

## 2021-10-03 PROBLEM — E43 SEVERE MALNUTRITION (HCC): Status: ACTIVE | Noted: 2021-10-03

## 2021-10-03 LAB
ALBUMIN SERPL-MCNC: 2.8 G/DL (ref 3.5–5.2)
ALBUMIN/GLOB SERPL: 1.3 G/DL
ALP SERPL-CCNC: 127 U/L (ref 39–117)
ALT SERPL W P-5'-P-CCNC: 8 U/L (ref 1–33)
ANION GAP SERPL CALCULATED.3IONS-SCNC: 8 MMOL/L (ref 5–15)
AST SERPL-CCNC: 21 U/L (ref 1–32)
BACTERIA SPEC AEROBE CULT: ABNORMAL
BASOPHILS # BLD AUTO: 0 10*3/MM3 (ref 0–0.2)
BASOPHILS NFR BLD AUTO: 0.1 % (ref 0–1.5)
BILIRUB SERPL-MCNC: 0.6 MG/DL (ref 0–1.2)
BUN SERPL-MCNC: 31 MG/DL (ref 8–23)
BUN/CREAT SERPL: 48.4 (ref 7–25)
CALCIUM SPEC-SCNC: 8.4 MG/DL (ref 8.6–10.5)
CHLORIDE SERPL-SCNC: 97 MMOL/L (ref 98–107)
CO2 SERPL-SCNC: 33 MMOL/L (ref 22–29)
CREAT SERPL-MCNC: 0.64 MG/DL (ref 0.57–1)
DEPRECATED RDW RBC AUTO: 49.9 FL (ref 37–54)
EOSINOPHIL # BLD AUTO: 0 10*3/MM3 (ref 0–0.4)
EOSINOPHIL NFR BLD AUTO: 0 % (ref 0.3–6.2)
ERYTHROCYTE [DISTWIDTH] IN BLOOD BY AUTOMATED COUNT: 15.3 % (ref 12.3–15.4)
GFR SERPL CREATININE-BSD FRML MDRD: 89 ML/MIN/1.73
GLOBULIN UR ELPH-MCNC: 2.2 GM/DL
GLUCOSE BLDC GLUCOMTR-MCNC: 132 MG/DL (ref 70–105)
GLUCOSE BLDC GLUCOMTR-MCNC: 186 MG/DL (ref 70–105)
GLUCOSE BLDC GLUCOMTR-MCNC: 187 MG/DL (ref 70–105)
GLUCOSE BLDC GLUCOMTR-MCNC: 78 MG/DL (ref 70–105)
GLUCOSE SERPL-MCNC: 230 MG/DL (ref 65–99)
GRAM STN SPEC: ABNORMAL
HCT VFR BLD AUTO: 29.1 % (ref 34–46.6)
HGB BLD-MCNC: 9.3 G/DL (ref 12–15.9)
ISOLATED FROM: ABNORMAL
LYMPHOCYTES # BLD AUTO: 0.4 10*3/MM3 (ref 0.7–3.1)
LYMPHOCYTES NFR BLD AUTO: 8 % (ref 19.6–45.3)
MAGNESIUM SERPL-MCNC: 1.8 MG/DL (ref 1.6–2.4)
MCH RBC QN AUTO: 29.5 PG (ref 26.6–33)
MCHC RBC AUTO-ENTMCNC: 32 G/DL (ref 31.5–35.7)
MCV RBC AUTO: 92 FL (ref 79–97)
MONOCYTES # BLD AUTO: 0.3 10*3/MM3 (ref 0.1–0.9)
MONOCYTES NFR BLD AUTO: 5.7 % (ref 5–12)
NEUTROPHILS NFR BLD AUTO: 4 10*3/MM3 (ref 1.7–7)
NEUTROPHILS NFR BLD AUTO: 86.2 % (ref 42.7–76)
NRBC BLD AUTO-RTO: 0.2 /100 WBC (ref 0–0.2)
PHOSPHATE SERPL-MCNC: 2 MG/DL (ref 2.5–4.5)
PLATELET # BLD AUTO: 150 10*3/MM3 (ref 140–450)
PMV BLD AUTO: 6.9 FL (ref 6–12)
POTASSIUM SERPL-SCNC: 4.3 MMOL/L (ref 3.5–5.2)
PROT SERPL-MCNC: 5 G/DL (ref 6–8.5)
RBC # BLD AUTO: 3.16 10*6/MM3 (ref 3.77–5.28)
SODIUM SERPL-SCNC: 138 MMOL/L (ref 136–145)
WBC # BLD AUTO: 4.7 10*3/MM3 (ref 3.4–10.8)

## 2021-10-03 PROCEDURE — 63710000001 INSULIN LISPRO (HUMAN) PER 5 UNITS: Performed by: HOSPITALIST

## 2021-10-03 PROCEDURE — 84100 ASSAY OF PHOSPHORUS: CPT | Performed by: STUDENT IN AN ORGANIZED HEALTH CARE EDUCATION/TRAINING PROGRAM

## 2021-10-03 PROCEDURE — 25010000002 LEVOFLOXACIN PER 250 MG: Performed by: HOSPITALIST

## 2021-10-03 PROCEDURE — 99232 SBSQ HOSP IP/OBS MODERATE 35: CPT | Performed by: INTERNAL MEDICINE

## 2021-10-03 PROCEDURE — 94799 UNLISTED PULMONARY SVC/PX: CPT

## 2021-10-03 PROCEDURE — 85025 COMPLETE CBC W/AUTO DIFF WBC: CPT | Performed by: STUDENT IN AN ORGANIZED HEALTH CARE EDUCATION/TRAINING PROGRAM

## 2021-10-03 PROCEDURE — 82962 GLUCOSE BLOOD TEST: CPT

## 2021-10-03 PROCEDURE — 80053 COMPREHEN METABOLIC PANEL: CPT | Performed by: STUDENT IN AN ORGANIZED HEALTH CARE EDUCATION/TRAINING PROGRAM

## 2021-10-03 PROCEDURE — 83735 ASSAY OF MAGNESIUM: CPT | Performed by: STUDENT IN AN ORGANIZED HEALTH CARE EDUCATION/TRAINING PROGRAM

## 2021-10-03 PROCEDURE — 25010000002 HEPARIN (PORCINE) PER 1000 UNITS: Performed by: STUDENT IN AN ORGANIZED HEALTH CARE EDUCATION/TRAINING PROGRAM

## 2021-10-03 PROCEDURE — 25010000002 METHYLPREDNISOLONE PER 125 MG: Performed by: INTERNAL MEDICINE

## 2021-10-03 PROCEDURE — 99232 SBSQ HOSP IP/OBS MODERATE 35: CPT | Performed by: HOSPITALIST

## 2021-10-03 RX ORDER — LEVOFLOXACIN 750 MG/1
750 TABLET ORAL
Status: COMPLETED | OUTPATIENT
Start: 2021-10-05 | End: 2021-10-07

## 2021-10-03 RX ADMIN — IPRATROPIUM BROMIDE AND ALBUTEROL SULFATE 3 ML: 2.5; .5 SOLUTION RESPIRATORY (INHALATION) at 23:30

## 2021-10-03 RX ADMIN — HEPARIN SODIUM 5000 UNITS: 5000 INJECTION INTRAVENOUS; SUBCUTANEOUS at 21:15

## 2021-10-03 RX ADMIN — INSULIN LISPRO 2 UNITS: 100 INJECTION, SOLUTION INTRAVENOUS; SUBCUTANEOUS at 17:01

## 2021-10-03 RX ADMIN — HEPARIN SODIUM 5000 UNITS: 5000 INJECTION INTRAVENOUS; SUBCUTANEOUS at 05:42

## 2021-10-03 RX ADMIN — BUDESONIDE 1 MG: 0.5 SUSPENSION RESPIRATORY (INHALATION) at 07:33

## 2021-10-03 RX ADMIN — HEPARIN SODIUM 5000 UNITS: 5000 INJECTION INTRAVENOUS; SUBCUTANEOUS at 17:01

## 2021-10-03 RX ADMIN — PANTOPRAZOLE SODIUM 40 MG: 40 TABLET, DELAYED RELEASE ORAL at 05:41

## 2021-10-03 RX ADMIN — METHYLPREDNISOLONE SODIUM SUCCINATE 60 MG: 125 INJECTION, POWDER, FOR SOLUTION INTRAMUSCULAR; INTRAVENOUS at 05:42

## 2021-10-03 RX ADMIN — LEVOFLOXACIN 750 MG: 5 INJECTION, SOLUTION INTRAVENOUS at 05:42

## 2021-10-03 RX ADMIN — IPRATROPIUM BROMIDE AND ALBUTEROL SULFATE 3 ML: 2.5; .5 SOLUTION RESPIRATORY (INHALATION) at 20:35

## 2021-10-03 RX ADMIN — METHYLPREDNISOLONE SODIUM SUCCINATE 60 MG: 125 INJECTION, POWDER, FOR SOLUTION INTRAMUSCULAR; INTRAVENOUS at 21:15

## 2021-10-03 RX ADMIN — IPRATROPIUM BROMIDE AND ALBUTEROL SULFATE 3 ML: 2.5; .5 SOLUTION RESPIRATORY (INHALATION) at 00:35

## 2021-10-03 RX ADMIN — DIGOXIN 125 MCG: 125 TABLET ORAL at 14:14

## 2021-10-03 RX ADMIN — Medication 10 ML: at 21:15

## 2021-10-03 RX ADMIN — ARFORMOTEROL TARTRATE 15 MCG: 15 SOLUTION RESPIRATORY (INHALATION) at 07:33

## 2021-10-03 RX ADMIN — ARFORMOTEROL TARTRATE 15 MCG: 15 SOLUTION RESPIRATORY (INHALATION) at 20:35

## 2021-10-03 RX ADMIN — ACETYLCYSTEINE 2 ML: 200 SOLUTION ORAL; RESPIRATORY (INHALATION) at 07:33

## 2021-10-03 RX ADMIN — Medication 10 ML: at 09:15

## 2021-10-03 RX ADMIN — IPRATROPIUM BROMIDE AND ALBUTEROL SULFATE 3 ML: 2.5; .5 SOLUTION RESPIRATORY (INHALATION) at 15:53

## 2021-10-03 RX ADMIN — INSULIN LISPRO 2 UNITS: 100 INJECTION, SOLUTION INTRAVENOUS; SUBCUTANEOUS at 09:14

## 2021-10-03 RX ADMIN — METHYLPREDNISOLONE SODIUM SUCCINATE 60 MG: 125 INJECTION, POWDER, FOR SOLUTION INTRAMUSCULAR; INTRAVENOUS at 14:14

## 2021-10-03 RX ADMIN — ACETYLCYSTEINE 2 ML: 200 SOLUTION ORAL; RESPIRATORY (INHALATION) at 20:35

## 2021-10-03 RX ADMIN — ACETAMINOPHEN 650 MG: 325 TABLET, FILM COATED ORAL at 09:14

## 2021-10-03 RX ADMIN — IPRATROPIUM BROMIDE AND ALBUTEROL SULFATE 3 ML: 2.5; .5 SOLUTION RESPIRATORY (INHALATION) at 07:33

## 2021-10-03 RX ADMIN — SODIUM CHLORIDE 50 ML/HR: 9 INJECTION, SOLUTION INTRAVENOUS at 14:03

## 2021-10-04 ENCOUNTER — APPOINTMENT (OUTPATIENT)
Dept: CT IMAGING | Facility: HOSPITAL | Age: 81
End: 2021-10-04

## 2021-10-04 LAB
ALBUMIN SERPL-MCNC: 3.1 G/DL (ref 3.5–5.2)
ALBUMIN/GLOB SERPL: 1.2 G/DL
ALP SERPL-CCNC: 192 U/L (ref 39–117)
ALT SERPL W P-5'-P-CCNC: 12 U/L (ref 1–33)
ANION GAP SERPL CALCULATED.3IONS-SCNC: 9 MMOL/L (ref 5–15)
AST SERPL-CCNC: 23 U/L (ref 1–32)
BASOPHILS # BLD AUTO: 0 10*3/MM3 (ref 0–0.2)
BASOPHILS NFR BLD AUTO: 0 % (ref 0–1.5)
BILIRUB SERPL-MCNC: 0.5 MG/DL (ref 0–1.2)
BUN SERPL-MCNC: 26 MG/DL (ref 8–23)
BUN/CREAT SERPL: 49.1 (ref 7–25)
CALCIUM SPEC-SCNC: 8.6 MG/DL (ref 8.6–10.5)
CHLORIDE SERPL-SCNC: 95 MMOL/L (ref 98–107)
CO2 SERPL-SCNC: 34 MMOL/L (ref 22–29)
CREAT SERPL-MCNC: 0.53 MG/DL (ref 0.57–1)
DEPRECATED RDW RBC AUTO: 48.6 FL (ref 37–54)
EOSINOPHIL # BLD AUTO: 0 10*3/MM3 (ref 0–0.4)
EOSINOPHIL NFR BLD AUTO: 0 % (ref 0.3–6.2)
ERYTHROCYTE [DISTWIDTH] IN BLOOD BY AUTOMATED COUNT: 15 % (ref 12.3–15.4)
GFR SERPL CREATININE-BSD FRML MDRD: 111 ML/MIN/1.73
GLOBULIN UR ELPH-MCNC: 2.5 GM/DL
GLUCOSE BLDC GLUCOMTR-MCNC: 125 MG/DL (ref 70–105)
GLUCOSE BLDC GLUCOMTR-MCNC: 163 MG/DL (ref 70–105)
GLUCOSE BLDC GLUCOMTR-MCNC: 179 MG/DL (ref 70–105)
GLUCOSE BLDC GLUCOMTR-MCNC: 195 MG/DL (ref 70–105)
GLUCOSE SERPL-MCNC: 174 MG/DL (ref 65–99)
HCT VFR BLD AUTO: 30.3 % (ref 34–46.6)
HGB BLD-MCNC: 9.7 G/DL (ref 12–15.9)
LYMPHOCYTES # BLD AUTO: 0.6 10*3/MM3 (ref 0.7–3.1)
LYMPHOCYTES NFR BLD AUTO: 11.1 % (ref 19.6–45.3)
MAGNESIUM SERPL-MCNC: 1.6 MG/DL (ref 1.6–2.4)
MCH RBC QN AUTO: 29.4 PG (ref 26.6–33)
MCHC RBC AUTO-ENTMCNC: 32.2 G/DL (ref 31.5–35.7)
MCV RBC AUTO: 91.5 FL (ref 79–97)
MONOCYTES # BLD AUTO: 0.2 10*3/MM3 (ref 0.1–0.9)
MONOCYTES NFR BLD AUTO: 4.6 % (ref 5–12)
NEUTROPHILS NFR BLD AUTO: 4.2 10*3/MM3 (ref 1.7–7)
NEUTROPHILS NFR BLD AUTO: 84.3 % (ref 42.7–76)
NRBC BLD AUTO-RTO: 0.1 /100 WBC (ref 0–0.2)
NT-PROBNP SERPL-MCNC: 8489 PG/ML (ref 0–1800)
PHOSPHATE SERPL-MCNC: 1.9 MG/DL (ref 2.5–4.5)
PLATELET # BLD AUTO: 138 10*3/MM3 (ref 140–450)
PMV BLD AUTO: 6.9 FL (ref 6–12)
POTASSIUM SERPL-SCNC: 4 MMOL/L (ref 3.5–5.2)
PROT SERPL-MCNC: 5.6 G/DL (ref 6–8.5)
RBC # BLD AUTO: 3.31 10*6/MM3 (ref 3.77–5.28)
SODIUM SERPL-SCNC: 138 MMOL/L (ref 136–145)
WBC # BLD AUTO: 5 10*3/MM3 (ref 3.4–10.8)

## 2021-10-04 PROCEDURE — 63710000001 INSULIN LISPRO (HUMAN) PER 5 UNITS: Performed by: HOSPITALIST

## 2021-10-04 PROCEDURE — 80053 COMPREHEN METABOLIC PANEL: CPT | Performed by: STUDENT IN AN ORGANIZED HEALTH CARE EDUCATION/TRAINING PROGRAM

## 2021-10-04 PROCEDURE — 25010000002 METHYLPREDNISOLONE PER 125 MG: Performed by: INTERNAL MEDICINE

## 2021-10-04 PROCEDURE — 97162 PT EVAL MOD COMPLEX 30 MIN: CPT

## 2021-10-04 PROCEDURE — 94799 UNLISTED PULMONARY SVC/PX: CPT

## 2021-10-04 PROCEDURE — 25010000002 HEPARIN (PORCINE) PER 1000 UNITS: Performed by: HOSPITALIST

## 2021-10-04 PROCEDURE — 25010000002 MAGNESIUM SULFATE IN D5W 1G/100ML (PREMIX) 1-5 GM/100ML-% SOLUTION: Performed by: STUDENT IN AN ORGANIZED HEALTH CARE EDUCATION/TRAINING PROGRAM

## 2021-10-04 PROCEDURE — 99232 SBSQ HOSP IP/OBS MODERATE 35: CPT | Performed by: HOSPITALIST

## 2021-10-04 PROCEDURE — 84100 ASSAY OF PHOSPHORUS: CPT | Performed by: STUDENT IN AN ORGANIZED HEALTH CARE EDUCATION/TRAINING PROGRAM

## 2021-10-04 PROCEDURE — 25010000002 HEPARIN (PORCINE) PER 1000 UNITS: Performed by: STUDENT IN AN ORGANIZED HEALTH CARE EDUCATION/TRAINING PROGRAM

## 2021-10-04 PROCEDURE — 83880 ASSAY OF NATRIURETIC PEPTIDE: CPT | Performed by: STUDENT IN AN ORGANIZED HEALTH CARE EDUCATION/TRAINING PROGRAM

## 2021-10-04 PROCEDURE — 82962 GLUCOSE BLOOD TEST: CPT

## 2021-10-04 PROCEDURE — 85025 COMPLETE CBC W/AUTO DIFF WBC: CPT | Performed by: STUDENT IN AN ORGANIZED HEALTH CARE EDUCATION/TRAINING PROGRAM

## 2021-10-04 PROCEDURE — 97166 OT EVAL MOD COMPLEX 45 MIN: CPT

## 2021-10-04 PROCEDURE — 99232 SBSQ HOSP IP/OBS MODERATE 35: CPT | Performed by: INTERNAL MEDICINE

## 2021-10-04 PROCEDURE — 71250 CT THORAX DX C-: CPT

## 2021-10-04 PROCEDURE — 83735 ASSAY OF MAGNESIUM: CPT | Performed by: STUDENT IN AN ORGANIZED HEALTH CARE EDUCATION/TRAINING PROGRAM

## 2021-10-04 PROCEDURE — 25010000002 FUROSEMIDE PER 20 MG: Performed by: HOSPITALIST

## 2021-10-04 RX ORDER — DIGOXIN 125 MCG
125 TABLET ORAL
Qty: 30 TABLET | Refills: 1 | Status: SHIPPED | OUTPATIENT
Start: 2021-10-05

## 2021-10-04 RX ORDER — METOPROLOL SUCCINATE 50 MG/1
50 TABLET, EXTENDED RELEASE ORAL DAILY
Status: DISCONTINUED | OUTPATIENT
Start: 2021-10-04 | End: 2021-10-09 | Stop reason: HOSPADM

## 2021-10-04 RX ORDER — DULOXETIN HYDROCHLORIDE 20 MG/1
20 CAPSULE, DELAYED RELEASE ORAL DAILY
Start: 2021-10-04

## 2021-10-04 RX ORDER — PREDNISONE 1 MG/1
5 TABLET ORAL DAILY
Status: DISCONTINUED | OUTPATIENT
Start: 2021-10-17 | End: 2021-10-09 | Stop reason: HOSPADM

## 2021-10-04 RX ORDER — PREDNISONE 1 MG/1
5 TABLET ORAL DAILY
Status: DISCONTINUED | OUTPATIENT
Start: 2021-10-04 | End: 2021-10-04

## 2021-10-04 RX ORDER — PREDNISONE 10 MG/1
10 TABLET ORAL DAILY
Status: DISCONTINUED | OUTPATIENT
Start: 2021-10-14 | End: 2021-10-09 | Stop reason: HOSPADM

## 2021-10-04 RX ORDER — IPRATROPIUM BROMIDE AND ALBUTEROL SULFATE 2.5; .5 MG/3ML; MG/3ML
3 SOLUTION RESPIRATORY (INHALATION)
Status: DISCONTINUED | OUTPATIENT
Start: 2021-10-04 | End: 2021-10-04 | Stop reason: SDUPTHER

## 2021-10-04 RX ORDER — ASPIRIN 81 MG/1
81 TABLET ORAL DAILY
Status: DISCONTINUED | OUTPATIENT
Start: 2021-10-04 | End: 2021-10-09 | Stop reason: HOSPADM

## 2021-10-04 RX ORDER — CLOPIDOGREL BISULFATE 75 MG/1
75 TABLET ORAL DAILY
Status: DISCONTINUED | OUTPATIENT
Start: 2021-10-04 | End: 2021-10-05

## 2021-10-04 RX ORDER — LEVOFLOXACIN 750 MG/1
750 TABLET ORAL
Qty: 2 TABLET | Refills: 0 | Status: SHIPPED | OUTPATIENT
Start: 2021-10-04 | End: 2021-10-07

## 2021-10-04 RX ORDER — TRAZODONE HYDROCHLORIDE 50 MG/1
50 TABLET ORAL NIGHTLY
Status: DISCONTINUED | OUTPATIENT
Start: 2021-10-04 | End: 2021-10-09 | Stop reason: HOSPADM

## 2021-10-04 RX ORDER — FUROSEMIDE 20 MG/1
20 TABLET ORAL DAILY
Qty: 30 TABLET | Refills: 0 | Status: SHIPPED | OUTPATIENT
Start: 2021-10-04

## 2021-10-04 RX ORDER — PANTOPRAZOLE SODIUM 40 MG/1
40 TABLET, DELAYED RELEASE ORAL DAILY
Status: DISCONTINUED | OUTPATIENT
Start: 2021-10-04 | End: 2021-10-04 | Stop reason: SDUPTHER

## 2021-10-04 RX ORDER — PREDNISONE 20 MG/1
20 TABLET ORAL DAILY
Status: DISCONTINUED | OUTPATIENT
Start: 2021-10-11 | End: 2021-10-09 | Stop reason: HOSPADM

## 2021-10-04 RX ORDER — FUROSEMIDE 10 MG/ML
20 INJECTION INTRAMUSCULAR; INTRAVENOUS EVERY 12 HOURS
Status: DISCONTINUED | OUTPATIENT
Start: 2021-10-04 | End: 2021-10-08

## 2021-10-04 RX ORDER — TRAMADOL HYDROCHLORIDE 50 MG/1
50 TABLET ORAL 3 TIMES DAILY PRN
Status: DISCONTINUED | OUTPATIENT
Start: 2021-10-04 | End: 2021-10-09 | Stop reason: HOSPADM

## 2021-10-04 RX ORDER — DULOXETIN HYDROCHLORIDE 20 MG/1
20 CAPSULE, DELAYED RELEASE ORAL DAILY
Status: DISCONTINUED | OUTPATIENT
Start: 2021-10-04 | End: 2021-10-09 | Stop reason: HOSPADM

## 2021-10-04 RX ORDER — PREDNISONE 20 MG/1
40 TABLET ORAL DAILY
Status: COMPLETED | OUTPATIENT
Start: 2021-10-05 | End: 2021-10-07

## 2021-10-04 RX ORDER — PREDNISONE 10 MG/1
10 TABLET ORAL DAILY
Qty: 30 TABLET | Refills: 0 | Status: SHIPPED | OUTPATIENT
Start: 2021-10-04

## 2021-10-04 RX ADMIN — BUDESONIDE 1 MG: 0.5 SUSPENSION RESPIRATORY (INHALATION) at 07:00

## 2021-10-04 RX ADMIN — BUDESONIDE 1 MG: 0.5 SUSPENSION RESPIRATORY (INHALATION) at 18:56

## 2021-10-04 RX ADMIN — POTASSIUM PHOSPHATE, MONOBASIC AND POTASSIUM PHOSPHATE, DIBASIC 9 MMOL: 224; 236 INJECTION, SOLUTION, CONCENTRATE INTRAVENOUS at 13:35

## 2021-10-04 RX ADMIN — INSULIN LISPRO 2 UNITS: 100 INJECTION, SOLUTION INTRAVENOUS; SUBCUTANEOUS at 10:43

## 2021-10-04 RX ADMIN — PANTOPRAZOLE SODIUM 40 MG: 40 TABLET, DELAYED RELEASE ORAL at 06:08

## 2021-10-04 RX ADMIN — HEPARIN SODIUM 5000 UNITS: 5000 INJECTION INTRAVENOUS; SUBCUTANEOUS at 22:02

## 2021-10-04 RX ADMIN — ACETYLCYSTEINE 2 ML: 200 SOLUTION ORAL; RESPIRATORY (INHALATION) at 18:50

## 2021-10-04 RX ADMIN — ARFORMOTEROL TARTRATE 15 MCG: 15 SOLUTION RESPIRATORY (INHALATION) at 07:00

## 2021-10-04 RX ADMIN — ARFORMOTEROL TARTRATE 15 MCG: 15 SOLUTION RESPIRATORY (INHALATION) at 18:45

## 2021-10-04 RX ADMIN — IPRATROPIUM BROMIDE AND ALBUTEROL SULFATE 3 ML: 2.5; .5 SOLUTION RESPIRATORY (INHALATION) at 03:40

## 2021-10-04 RX ADMIN — ACETYLCYSTEINE 2 ML: 200 SOLUTION ORAL; RESPIRATORY (INHALATION) at 07:00

## 2021-10-04 RX ADMIN — IPRATROPIUM BROMIDE AND ALBUTEROL SULFATE 3 ML: 2.5; .5 SOLUTION RESPIRATORY (INHALATION) at 11:09

## 2021-10-04 RX ADMIN — METHYLPREDNISOLONE SODIUM SUCCINATE 60 MG: 125 INJECTION, POWDER, FOR SOLUTION INTRAMUSCULAR; INTRAVENOUS at 06:08

## 2021-10-04 RX ADMIN — MAGNESIUM SULFATE IN DEXTROSE 1 G: 10 INJECTION, SOLUTION INTRAVENOUS at 10:42

## 2021-10-04 RX ADMIN — IPRATROPIUM BROMIDE AND ALBUTEROL SULFATE 3 ML: 2.5; .5 SOLUTION RESPIRATORY (INHALATION) at 23:35

## 2021-10-04 RX ADMIN — TRAZODONE HYDROCHLORIDE 50 MG: 50 TABLET ORAL at 20:18

## 2021-10-04 RX ADMIN — ASPIRIN 81 MG: 81 TABLET, COATED ORAL at 15:45

## 2021-10-04 RX ADMIN — CLOPIDOGREL BISULFATE 75 MG: 75 TABLET ORAL at 15:45

## 2021-10-04 RX ADMIN — DIGOXIN 125 MCG: 125 TABLET ORAL at 12:22

## 2021-10-04 RX ADMIN — FUROSEMIDE 20 MG: 10 INJECTION, SOLUTION INTRAMUSCULAR; INTRAVENOUS at 17:21

## 2021-10-04 RX ADMIN — DULOXETINE 20 MG: 20 CAPSULE, DELAYED RELEASE ORAL at 17:21

## 2021-10-04 RX ADMIN — METHYLPREDNISOLONE SODIUM SUCCINATE 60 MG: 125 INJECTION, POWDER, FOR SOLUTION INTRAMUSCULAR; INTRAVENOUS at 12:22

## 2021-10-04 RX ADMIN — INSULIN LISPRO 2 UNITS: 100 INJECTION, SOLUTION INTRAVENOUS; SUBCUTANEOUS at 17:56

## 2021-10-04 RX ADMIN — Medication 10 ML: at 20:08

## 2021-10-04 RX ADMIN — Medication 10 ML: at 10:43

## 2021-10-04 RX ADMIN — IPRATROPIUM BROMIDE AND ALBUTEROL SULFATE 3 ML: 2.5; .5 SOLUTION RESPIRATORY (INHALATION) at 15:06

## 2021-10-04 RX ADMIN — SODIUM CHLORIDE 50 ML/HR: 9 INJECTION, SOLUTION INTRAVENOUS at 14:22

## 2021-10-04 RX ADMIN — HEPARIN SODIUM 5000 UNITS: 5000 INJECTION INTRAVENOUS; SUBCUTANEOUS at 14:22

## 2021-10-04 RX ADMIN — IPRATROPIUM BROMIDE AND ALBUTEROL SULFATE 3 ML: 2.5; .5 SOLUTION RESPIRATORY (INHALATION) at 07:00

## 2021-10-04 RX ADMIN — METOPROLOL SUCCINATE 50 MG: 50 TABLET, EXTENDED RELEASE ORAL at 15:45

## 2021-10-04 RX ADMIN — HEPARIN SODIUM 5000 UNITS: 5000 INJECTION INTRAVENOUS; SUBCUTANEOUS at 06:08

## 2021-10-05 LAB
ALBUMIN SERPL-MCNC: 2.8 G/DL (ref 3.5–5.2)
ALBUMIN/GLOB SERPL: 1.5 G/DL
ALP SERPL-CCNC: 130 U/L (ref 39–117)
ALT SERPL W P-5'-P-CCNC: 11 U/L (ref 1–33)
ANION GAP SERPL CALCULATED.3IONS-SCNC: 8 MMOL/L (ref 5–15)
AST SERPL-CCNC: 13 U/L (ref 1–32)
BASOPHILS # BLD AUTO: 0 10*3/MM3 (ref 0–0.2)
BASOPHILS NFR BLD AUTO: 0 % (ref 0–1.5)
BILIRUB SERPL-MCNC: 0.3 MG/DL (ref 0–1.2)
BUN SERPL-MCNC: 27 MG/DL (ref 8–23)
BUN/CREAT SERPL: 45 (ref 7–25)
CALCIUM SPEC-SCNC: 7.9 MG/DL (ref 8.6–10.5)
CHLORIDE SERPL-SCNC: 97 MMOL/L (ref 98–107)
CO2 SERPL-SCNC: 35 MMOL/L (ref 22–29)
CREAT SERPL-MCNC: 0.6 MG/DL (ref 0.57–1)
DEPRECATED RDW RBC AUTO: 47.7 FL (ref 37–54)
EOSINOPHIL # BLD AUTO: 0 10*3/MM3 (ref 0–0.4)
EOSINOPHIL NFR BLD AUTO: 0 % (ref 0.3–6.2)
ERYTHROCYTE [DISTWIDTH] IN BLOOD BY AUTOMATED COUNT: 15 % (ref 12.3–15.4)
GFR SERPL CREATININE-BSD FRML MDRD: 96 ML/MIN/1.73
GLOBULIN UR ELPH-MCNC: 1.9 GM/DL
GLUCOSE BLDC GLUCOMTR-MCNC: 113 MG/DL (ref 70–105)
GLUCOSE BLDC GLUCOMTR-MCNC: 126 MG/DL (ref 70–105)
GLUCOSE BLDC GLUCOMTR-MCNC: 170 MG/DL (ref 70–105)
GLUCOSE BLDC GLUCOMTR-MCNC: 86 MG/DL (ref 70–105)
GLUCOSE SERPL-MCNC: 143 MG/DL (ref 65–99)
HCT VFR BLD AUTO: 26.6 % (ref 34–46.6)
HGB BLD-MCNC: 8.6 G/DL (ref 12–15.9)
LYMPHOCYTES # BLD AUTO: 0.8 10*3/MM3 (ref 0.7–3.1)
LYMPHOCYTES NFR BLD AUTO: 10.7 % (ref 19.6–45.3)
MAGNESIUM SERPL-MCNC: 1.6 MG/DL (ref 1.6–2.4)
MCH RBC QN AUTO: 29 PG (ref 26.6–33)
MCHC RBC AUTO-ENTMCNC: 32.3 G/DL (ref 31.5–35.7)
MCV RBC AUTO: 89.8 FL (ref 79–97)
MONOCYTES # BLD AUTO: 0.8 10*3/MM3 (ref 0.1–0.9)
MONOCYTES NFR BLD AUTO: 10.7 % (ref 5–12)
NEUTROPHILS NFR BLD AUTO: 5.5 10*3/MM3 (ref 1.7–7)
NEUTROPHILS NFR BLD AUTO: 78.6 % (ref 42.7–76)
NRBC BLD AUTO-RTO: 0.1 /100 WBC (ref 0–0.2)
PHOSPHATE SERPL-MCNC: 2 MG/DL (ref 2.5–4.5)
PLATELET # BLD AUTO: 120 10*3/MM3 (ref 140–450)
PMV BLD AUTO: 6.9 FL (ref 6–12)
POTASSIUM SERPL-SCNC: 3.7 MMOL/L (ref 3.5–5.2)
PROT SERPL-MCNC: 4.7 G/DL (ref 6–8.5)
RBC # BLD AUTO: 2.97 10*6/MM3 (ref 3.77–5.28)
SODIUM SERPL-SCNC: 140 MMOL/L (ref 136–145)
WBC # BLD AUTO: 7.1 10*3/MM3 (ref 3.4–10.8)

## 2021-10-05 PROCEDURE — 97530 THERAPEUTIC ACTIVITIES: CPT

## 2021-10-05 PROCEDURE — 25010000002 HEPARIN (PORCINE) PER 1000 UNITS: Performed by: HOSPITALIST

## 2021-10-05 PROCEDURE — 94799 UNLISTED PULMONARY SVC/PX: CPT

## 2021-10-05 PROCEDURE — 80053 COMPREHEN METABOLIC PANEL: CPT | Performed by: HOSPITALIST

## 2021-10-05 PROCEDURE — 63710000001 PREDNISONE PER 1 MG: Performed by: HOSPITALIST

## 2021-10-05 PROCEDURE — 84100 ASSAY OF PHOSPHORUS: CPT | Performed by: HOSPITALIST

## 2021-10-05 PROCEDURE — 85025 COMPLETE CBC W/AUTO DIFF WBC: CPT | Performed by: HOSPITALIST

## 2021-10-05 PROCEDURE — 99232 SBSQ HOSP IP/OBS MODERATE 35: CPT | Performed by: INTERNAL MEDICINE

## 2021-10-05 PROCEDURE — 83735 ASSAY OF MAGNESIUM: CPT | Performed by: HOSPITALIST

## 2021-10-05 PROCEDURE — 25010000002 FUROSEMIDE PER 20 MG: Performed by: HOSPITALIST

## 2021-10-05 PROCEDURE — 82962 GLUCOSE BLOOD TEST: CPT

## 2021-10-05 PROCEDURE — 99232 SBSQ HOSP IP/OBS MODERATE 35: CPT | Performed by: HOSPITALIST

## 2021-10-05 PROCEDURE — 63710000001 INSULIN LISPRO (HUMAN) PER 5 UNITS: Performed by: HOSPITALIST

## 2021-10-05 RX ADMIN — BUDESONIDE 1 MG: 0.5 SUSPENSION RESPIRATORY (INHALATION) at 19:35

## 2021-10-05 RX ADMIN — ACETYLCYSTEINE 2 ML: 200 SOLUTION ORAL; RESPIRATORY (INHALATION) at 19:30

## 2021-10-05 RX ADMIN — BUDESONIDE 1 MG: 0.5 SUSPENSION RESPIRATORY (INHALATION) at 07:09

## 2021-10-05 RX ADMIN — APIXABAN 5 MG: 5 TABLET, FILM COATED ORAL at 20:25

## 2021-10-05 RX ADMIN — METOPROLOL SUCCINATE 50 MG: 50 TABLET, EXTENDED RELEASE ORAL at 09:52

## 2021-10-05 RX ADMIN — Medication 10 ML: at 20:27

## 2021-10-05 RX ADMIN — PANTOPRAZOLE SODIUM 40 MG: 40 TABLET, DELAYED RELEASE ORAL at 05:38

## 2021-10-05 RX ADMIN — ACETYLCYSTEINE 2 ML: 200 SOLUTION ORAL; RESPIRATORY (INHALATION) at 07:09

## 2021-10-05 RX ADMIN — HEPARIN SODIUM 5000 UNITS: 5000 INJECTION INTRAVENOUS; SUBCUTANEOUS at 20:25

## 2021-10-05 RX ADMIN — PREDNISONE 40 MG: 20 TABLET ORAL at 09:52

## 2021-10-05 RX ADMIN — APIXABAN 5 MG: 5 TABLET, FILM COATED ORAL at 12:48

## 2021-10-05 RX ADMIN — ARFORMOTEROL TARTRATE 15 MCG: 15 SOLUTION RESPIRATORY (INHALATION) at 19:25

## 2021-10-05 RX ADMIN — CLOPIDOGREL BISULFATE 75 MG: 75 TABLET ORAL at 09:52

## 2021-10-05 RX ADMIN — DIGOXIN 125 MCG: 125 TABLET ORAL at 12:48

## 2021-10-05 RX ADMIN — ASPIRIN 81 MG: 81 TABLET, COATED ORAL at 09:52

## 2021-10-05 RX ADMIN — HEPARIN SODIUM 5000 UNITS: 5000 INJECTION INTRAVENOUS; SUBCUTANEOUS at 14:48

## 2021-10-05 RX ADMIN — IPRATROPIUM BROMIDE AND ALBUTEROL SULFATE 3 ML: 2.5; .5 SOLUTION RESPIRATORY (INHALATION) at 07:09

## 2021-10-05 RX ADMIN — HEPARIN SODIUM 5000 UNITS: 5000 INJECTION INTRAVENOUS; SUBCUTANEOUS at 05:34

## 2021-10-05 RX ADMIN — FUROSEMIDE 20 MG: 10 INJECTION, SOLUTION INTRAMUSCULAR; INTRAVENOUS at 05:39

## 2021-10-05 RX ADMIN — IPRATROPIUM BROMIDE AND ALBUTEROL SULFATE 3 ML: 2.5; .5 SOLUTION RESPIRATORY (INHALATION) at 10:59

## 2021-10-05 RX ADMIN — DULOXETINE 20 MG: 20 CAPSULE, DELAYED RELEASE ORAL at 09:52

## 2021-10-05 RX ADMIN — IPRATROPIUM BROMIDE AND ALBUTEROL SULFATE 3 ML: 2.5; .5 SOLUTION RESPIRATORY (INHALATION) at 15:25

## 2021-10-05 RX ADMIN — INSULIN LISPRO 2 UNITS: 100 INJECTION, SOLUTION INTRAVENOUS; SUBCUTANEOUS at 18:37

## 2021-10-05 RX ADMIN — Medication 10 ML: at 09:52

## 2021-10-05 RX ADMIN — ARFORMOTEROL TARTRATE 15 MCG: 15 SOLUTION RESPIRATORY (INHALATION) at 07:09

## 2021-10-05 RX ADMIN — IPRATROPIUM BROMIDE AND ALBUTEROL SULFATE 3 ML: 2.5; .5 SOLUTION RESPIRATORY (INHALATION) at 23:48

## 2021-10-05 RX ADMIN — LEVOFLOXACIN 750 MG: 750 TABLET, FILM COATED ORAL at 09:52

## 2021-10-05 RX ADMIN — TRAZODONE HYDROCHLORIDE 50 MG: 50 TABLET ORAL at 20:26

## 2021-10-05 RX ADMIN — FUROSEMIDE 20 MG: 10 INJECTION, SOLUTION INTRAMUSCULAR; INTRAVENOUS at 18:37

## 2021-10-06 LAB
ALBUMIN SERPL-MCNC: 2.7 G/DL (ref 3.5–5.2)
ALBUMIN/GLOB SERPL: 1.7 G/DL
ALP SERPL-CCNC: 108 U/L (ref 39–117)
ALT SERPL W P-5'-P-CCNC: 9 U/L (ref 1–33)
ANION GAP SERPL CALCULATED.3IONS-SCNC: 7 MMOL/L (ref 5–15)
AST SERPL-CCNC: 10 U/L (ref 1–32)
BACTERIA SPEC AEROBE CULT: NORMAL
BASOPHILS # BLD AUTO: 0 10*3/MM3 (ref 0–0.2)
BASOPHILS NFR BLD AUTO: 0 % (ref 0–1.5)
BILIRUB SERPL-MCNC: 0.3 MG/DL (ref 0–1.2)
BUN SERPL-MCNC: 24 MG/DL (ref 8–23)
BUN/CREAT SERPL: 47.1 (ref 7–25)
CALCIUM SPEC-SCNC: 8.2 MG/DL (ref 8.6–10.5)
CHLORIDE SERPL-SCNC: 93 MMOL/L (ref 98–107)
CO2 SERPL-SCNC: 39 MMOL/L (ref 22–29)
CREAT SERPL-MCNC: 0.51 MG/DL (ref 0.57–1)
DEPRECATED RDW RBC AUTO: 48.1 FL (ref 37–54)
EOSINOPHIL # BLD AUTO: 0 10*3/MM3 (ref 0–0.4)
EOSINOPHIL NFR BLD AUTO: 0.1 % (ref 0.3–6.2)
ERYTHROCYTE [DISTWIDTH] IN BLOOD BY AUTOMATED COUNT: 15 % (ref 12.3–15.4)
GFR SERPL CREATININE-BSD FRML MDRD: 116 ML/MIN/1.73
GLOBULIN UR ELPH-MCNC: 1.6 GM/DL
GLUCOSE BLDC GLUCOMTR-MCNC: 111 MG/DL (ref 70–105)
GLUCOSE BLDC GLUCOMTR-MCNC: 193 MG/DL (ref 70–105)
GLUCOSE BLDC GLUCOMTR-MCNC: 207 MG/DL (ref 70–105)
GLUCOSE BLDC GLUCOMTR-MCNC: 93 MG/DL (ref 70–105)
GLUCOSE SERPL-MCNC: 85 MG/DL (ref 65–99)
HCT VFR BLD AUTO: 24.4 % (ref 34–46.6)
HCT VFR BLD AUTO: 27.5 % (ref 34–46.6)
HGB BLD-MCNC: 7.9 G/DL (ref 12–15.9)
HGB BLD-MCNC: 9.1 G/DL (ref 12–15.9)
LYMPHOCYTES # BLD AUTO: 1.4 10*3/MM3 (ref 0.7–3.1)
LYMPHOCYTES NFR BLD AUTO: 18.9 % (ref 19.6–45.3)
MAGNESIUM SERPL-MCNC: 1.4 MG/DL (ref 1.6–2.4)
MCH RBC QN AUTO: 28.7 PG (ref 26.6–33)
MCHC RBC AUTO-ENTMCNC: 32.5 G/DL (ref 31.5–35.7)
MCV RBC AUTO: 88.3 FL (ref 79–97)
MONOCYTES # BLD AUTO: 0.8 10*3/MM3 (ref 0.1–0.9)
MONOCYTES NFR BLD AUTO: 10.9 % (ref 5–12)
NEUTROPHILS NFR BLD AUTO: 5.2 10*3/MM3 (ref 1.7–7)
NEUTROPHILS NFR BLD AUTO: 70.1 % (ref 42.7–76)
NRBC BLD AUTO-RTO: 0.1 /100 WBC (ref 0–0.2)
PHOSPHATE SERPL-MCNC: 2.1 MG/DL (ref 2.5–4.5)
PLATELET # BLD AUTO: 122 10*3/MM3 (ref 140–450)
PMV BLD AUTO: 6.7 FL (ref 6–12)
POTASSIUM SERPL-SCNC: 3.7 MMOL/L (ref 3.5–5.2)
PROT SERPL-MCNC: 4.3 G/DL (ref 6–8.5)
RBC # BLD AUTO: 2.76 10*6/MM3 (ref 3.77–5.28)
SODIUM SERPL-SCNC: 139 MMOL/L (ref 136–145)
WBC # BLD AUTO: 7.4 10*3/MM3 (ref 3.4–10.8)

## 2021-10-06 PROCEDURE — 25010000002 MAGNESIUM SULFATE 2 GM/50ML SOLUTION: Performed by: HOSPITALIST

## 2021-10-06 PROCEDURE — 85014 HEMATOCRIT: CPT | Performed by: HOSPITALIST

## 2021-10-06 PROCEDURE — 99232 SBSQ HOSP IP/OBS MODERATE 35: CPT | Performed by: HOSPITALIST

## 2021-10-06 PROCEDURE — 63710000001 INSULIN LISPRO (HUMAN) PER 5 UNITS: Performed by: HOSPITALIST

## 2021-10-06 PROCEDURE — 63710000001 PREDNISONE PER 1 MG: Performed by: HOSPITALIST

## 2021-10-06 PROCEDURE — 94640 AIRWAY INHALATION TREATMENT: CPT

## 2021-10-06 PROCEDURE — 25010000002 FUROSEMIDE PER 20 MG: Performed by: HOSPITALIST

## 2021-10-06 PROCEDURE — 25010000002 HEPARIN (PORCINE) PER 1000 UNITS: Performed by: HOSPITALIST

## 2021-10-06 PROCEDURE — 84100 ASSAY OF PHOSPHORUS: CPT | Performed by: HOSPITALIST

## 2021-10-06 PROCEDURE — 94799 UNLISTED PULMONARY SVC/PX: CPT

## 2021-10-06 PROCEDURE — 99232 SBSQ HOSP IP/OBS MODERATE 35: CPT | Performed by: INTERNAL MEDICINE

## 2021-10-06 PROCEDURE — 82962 GLUCOSE BLOOD TEST: CPT

## 2021-10-06 PROCEDURE — 97535 SELF CARE MNGMENT TRAINING: CPT

## 2021-10-06 PROCEDURE — 85025 COMPLETE CBC W/AUTO DIFF WBC: CPT | Performed by: HOSPITALIST

## 2021-10-06 PROCEDURE — 83735 ASSAY OF MAGNESIUM: CPT | Performed by: HOSPITALIST

## 2021-10-06 PROCEDURE — 80053 COMPREHEN METABOLIC PANEL: CPT | Performed by: HOSPITALIST

## 2021-10-06 PROCEDURE — 85018 HEMOGLOBIN: CPT | Performed by: HOSPITALIST

## 2021-10-06 RX ORDER — CLONAZEPAM 0.5 MG/1
0.5 TABLET ORAL NIGHTLY PRN
Status: DISCONTINUED | OUTPATIENT
Start: 2021-10-06 | End: 2021-10-09 | Stop reason: HOSPADM

## 2021-10-06 RX ADMIN — HEPARIN SODIUM 5000 UNITS: 5000 INJECTION INTRAVENOUS; SUBCUTANEOUS at 21:57

## 2021-10-06 RX ADMIN — BUDESONIDE 0.5 MG: 0.5 SUSPENSION RESPIRATORY (INHALATION) at 18:47

## 2021-10-06 RX ADMIN — ACETYLCYSTEINE 2 ML: 200 SOLUTION ORAL; RESPIRATORY (INHALATION) at 07:20

## 2021-10-06 RX ADMIN — APIXABAN 5 MG: 5 TABLET, FILM COATED ORAL at 21:57

## 2021-10-06 RX ADMIN — FUROSEMIDE 20 MG: 10 INJECTION, SOLUTION INTRAMUSCULAR; INTRAVENOUS at 05:40

## 2021-10-06 RX ADMIN — FUROSEMIDE 20 MG: 10 INJECTION, SOLUTION INTRAMUSCULAR; INTRAVENOUS at 17:20

## 2021-10-06 RX ADMIN — PREDNISONE 40 MG: 20 TABLET ORAL at 08:28

## 2021-10-06 RX ADMIN — ARFORMOTEROL TARTRATE 15 MCG: 15 SOLUTION RESPIRATORY (INHALATION) at 07:20

## 2021-10-06 RX ADMIN — IPRATROPIUM BROMIDE AND ALBUTEROL SULFATE 3 ML: 2.5; .5 SOLUTION RESPIRATORY (INHALATION) at 18:47

## 2021-10-06 RX ADMIN — IPRATROPIUM BROMIDE AND ALBUTEROL SULFATE 3 ML: 2.5; .5 SOLUTION RESPIRATORY (INHALATION) at 04:08

## 2021-10-06 RX ADMIN — DULOXETINE 20 MG: 20 CAPSULE, DELAYED RELEASE ORAL at 12:05

## 2021-10-06 RX ADMIN — ASPIRIN 81 MG: 81 TABLET, COATED ORAL at 08:28

## 2021-10-06 RX ADMIN — HEPARIN SODIUM 5000 UNITS: 5000 INJECTION INTRAVENOUS; SUBCUTANEOUS at 15:20

## 2021-10-06 RX ADMIN — METOPROLOL SUCCINATE 50 MG: 50 TABLET, EXTENDED RELEASE ORAL at 08:28

## 2021-10-06 RX ADMIN — Medication 10 ML: at 21:57

## 2021-10-06 RX ADMIN — PANTOPRAZOLE SODIUM 40 MG: 40 TABLET, DELAYED RELEASE ORAL at 05:40

## 2021-10-06 RX ADMIN — IPRATROPIUM BROMIDE AND ALBUTEROL SULFATE 3 ML: 2.5; .5 SOLUTION RESPIRATORY (INHALATION) at 11:40

## 2021-10-06 RX ADMIN — DIGOXIN 125 MCG: 125 TABLET ORAL at 12:05

## 2021-10-06 RX ADMIN — MAGNESIUM SULFATE HEPTAHYDRATE 2 G: 2 INJECTION, SOLUTION INTRAVENOUS at 08:30

## 2021-10-06 RX ADMIN — BUDESONIDE 1 MG: 0.5 SUSPENSION RESPIRATORY (INHALATION) at 07:20

## 2021-10-06 RX ADMIN — ACETYLCYSTEINE 2 ML: 200 SOLUTION ORAL; RESPIRATORY (INHALATION) at 18:48

## 2021-10-06 RX ADMIN — HEPARIN SODIUM 5000 UNITS: 5000 INJECTION INTRAVENOUS; SUBCUTANEOUS at 05:40

## 2021-10-06 RX ADMIN — APIXABAN 5 MG: 5 TABLET, FILM COATED ORAL at 08:28

## 2021-10-06 RX ADMIN — CLONAZEPAM 0.5 MG: 0.5 TABLET ORAL at 21:57

## 2021-10-06 RX ADMIN — IPRATROPIUM BROMIDE AND ALBUTEROL SULFATE 3 ML: 2.5; .5 SOLUTION RESPIRATORY (INHALATION) at 15:15

## 2021-10-06 RX ADMIN — Medication 10 ML: at 08:29

## 2021-10-06 RX ADMIN — INSULIN LISPRO 3 UNITS: 100 INJECTION, SOLUTION INTRAVENOUS; SUBCUTANEOUS at 17:44

## 2021-10-07 LAB
ALBUMIN SERPL-MCNC: 2.6 G/DL (ref 3.5–5.2)
ALBUMIN/GLOB SERPL: 1.6 G/DL
ALP SERPL-CCNC: 94 U/L (ref 39–117)
ALT SERPL W P-5'-P-CCNC: 8 U/L (ref 1–33)
ANION GAP SERPL CALCULATED.3IONS-SCNC: 6 MMOL/L (ref 5–15)
AST SERPL-CCNC: 10 U/L (ref 1–32)
BASOPHILS # BLD AUTO: 0 10*3/MM3 (ref 0–0.2)
BASOPHILS NFR BLD AUTO: 0.1 % (ref 0–1.5)
BILIRUB SERPL-MCNC: 0.3 MG/DL (ref 0–1.2)
BUN SERPL-MCNC: 28 MG/DL (ref 8–23)
BUN/CREAT SERPL: 52.8 (ref 7–25)
CALCIUM SPEC-SCNC: 7.9 MG/DL (ref 8.6–10.5)
CHLORIDE SERPL-SCNC: 91 MMOL/L (ref 98–107)
CO2 SERPL-SCNC: 40 MMOL/L (ref 22–29)
CREAT SERPL-MCNC: 0.53 MG/DL (ref 0.57–1)
DEPRECATED RDW RBC AUTO: 46.8 FL (ref 37–54)
EOSINOPHIL # BLD AUTO: 0 10*3/MM3 (ref 0–0.4)
EOSINOPHIL NFR BLD AUTO: 0.3 % (ref 0.3–6.2)
ERYTHROCYTE [DISTWIDTH] IN BLOOD BY AUTOMATED COUNT: 14.8 % (ref 12.3–15.4)
GFR SERPL CREATININE-BSD FRML MDRD: 111 ML/MIN/1.73
GLOBULIN UR ELPH-MCNC: 1.6 GM/DL
GLUCOSE BLDC GLUCOMTR-MCNC: 127 MG/DL (ref 70–105)
GLUCOSE BLDC GLUCOMTR-MCNC: 173 MG/DL (ref 70–105)
GLUCOSE BLDC GLUCOMTR-MCNC: 273 MG/DL (ref 70–105)
GLUCOSE BLDC GLUCOMTR-MCNC: 91 MG/DL (ref 70–105)
GLUCOSE SERPL-MCNC: 91 MG/DL (ref 65–99)
HCT VFR BLD AUTO: 22.2 % (ref 34–46.6)
HCT VFR BLD AUTO: 24.5 % (ref 34–46.6)
HGB BLD-MCNC: 7.3 G/DL (ref 12–15.9)
HGB BLD-MCNC: 8 G/DL (ref 12–15.9)
LYMPHOCYTES # BLD AUTO: 1.5 10*3/MM3 (ref 0.7–3.1)
LYMPHOCYTES NFR BLD AUTO: 22.7 % (ref 19.6–45.3)
MAGNESIUM SERPL-MCNC: 1.5 MG/DL (ref 1.6–2.4)
MCH RBC QN AUTO: 28.9 PG (ref 26.6–33)
MCHC RBC AUTO-ENTMCNC: 32.8 G/DL (ref 31.5–35.7)
MCV RBC AUTO: 88.2 FL (ref 79–97)
MONOCYTES # BLD AUTO: 0.6 10*3/MM3 (ref 0.1–0.9)
MONOCYTES NFR BLD AUTO: 9.8 % (ref 5–12)
NEUTROPHILS NFR BLD AUTO: 4.3 10*3/MM3 (ref 1.7–7)
NEUTROPHILS NFR BLD AUTO: 67.1 % (ref 42.7–76)
NRBC BLD AUTO-RTO: 0 /100 WBC (ref 0–0.2)
PHOSPHATE SERPL-MCNC: 3.2 MG/DL (ref 2.5–4.5)
PLATELET # BLD AUTO: 156 10*3/MM3 (ref 140–450)
PMV BLD AUTO: 7.1 FL (ref 6–12)
POTASSIUM SERPL-SCNC: 3.5 MMOL/L (ref 3.5–5.2)
PROT SERPL-MCNC: 4.2 G/DL (ref 6–8.5)
RBC # BLD AUTO: 2.52 10*6/MM3 (ref 3.77–5.28)
SODIUM SERPL-SCNC: 137 MMOL/L (ref 136–145)
WBC # BLD AUTO: 6.4 10*3/MM3 (ref 3.4–10.8)

## 2021-10-07 PROCEDURE — 84100 ASSAY OF PHOSPHORUS: CPT | Performed by: HOSPITALIST

## 2021-10-07 PROCEDURE — 97530 THERAPEUTIC ACTIVITIES: CPT

## 2021-10-07 PROCEDURE — 85014 HEMATOCRIT: CPT | Performed by: HOSPITALIST

## 2021-10-07 PROCEDURE — 82962 GLUCOSE BLOOD TEST: CPT

## 2021-10-07 PROCEDURE — 99232 SBSQ HOSP IP/OBS MODERATE 35: CPT | Performed by: INTERNAL MEDICINE

## 2021-10-07 PROCEDURE — 85018 HEMOGLOBIN: CPT | Performed by: HOSPITALIST

## 2021-10-07 PROCEDURE — 25010000002 MAGNESIUM SULFATE 2 GM/50ML SOLUTION: Performed by: HOSPITALIST

## 2021-10-07 PROCEDURE — 25010000002 FUROSEMIDE PER 20 MG: Performed by: HOSPITALIST

## 2021-10-07 PROCEDURE — 94799 UNLISTED PULMONARY SVC/PX: CPT

## 2021-10-07 PROCEDURE — 80053 COMPREHEN METABOLIC PANEL: CPT | Performed by: HOSPITALIST

## 2021-10-07 PROCEDURE — 25010000002 HEPARIN (PORCINE) PER 1000 UNITS: Performed by: HOSPITALIST

## 2021-10-07 PROCEDURE — 85025 COMPLETE CBC W/AUTO DIFF WBC: CPT | Performed by: HOSPITALIST

## 2021-10-07 PROCEDURE — 63710000001 PREDNISONE PER 1 MG: Performed by: HOSPITALIST

## 2021-10-07 PROCEDURE — 63710000001 INSULIN LISPRO (HUMAN) PER 5 UNITS: Performed by: HOSPITALIST

## 2021-10-07 PROCEDURE — 83735 ASSAY OF MAGNESIUM: CPT | Performed by: HOSPITALIST

## 2021-10-07 PROCEDURE — 99239 HOSP IP/OBS DSCHRG MGMT >30: CPT | Performed by: HOSPITALIST

## 2021-10-07 PROCEDURE — 97110 THERAPEUTIC EXERCISES: CPT

## 2021-10-07 RX ORDER — CLONAZEPAM 0.5 MG/1
0.5 TABLET ORAL NIGHTLY PRN
Qty: 5 TABLET | Refills: 0 | Status: SHIPPED | OUTPATIENT
Start: 2021-10-07 | End: 2021-10-13

## 2021-10-07 RX ADMIN — INSULIN LISPRO 4 UNITS: 100 INJECTION, SOLUTION INTRAVENOUS; SUBCUTANEOUS at 12:56

## 2021-10-07 RX ADMIN — ACETYLCYSTEINE 2 ML: 200 SOLUTION ORAL; RESPIRATORY (INHALATION) at 07:03

## 2021-10-07 RX ADMIN — FUROSEMIDE 20 MG: 10 INJECTION, SOLUTION INTRAMUSCULAR; INTRAVENOUS at 17:08

## 2021-10-07 RX ADMIN — MAGNESIUM SULFATE HEPTAHYDRATE 2 G: 2 INJECTION, SOLUTION INTRAVENOUS at 19:49

## 2021-10-07 RX ADMIN — Medication 10 ML: at 20:53

## 2021-10-07 RX ADMIN — ASPIRIN 81 MG: 81 TABLET, COATED ORAL at 08:09

## 2021-10-07 RX ADMIN — BUDESONIDE 1 MG: 0.5 SUSPENSION RESPIRATORY (INHALATION) at 19:06

## 2021-10-07 RX ADMIN — BUDESONIDE 1 MG: 0.5 SUSPENSION RESPIRATORY (INHALATION) at 07:03

## 2021-10-07 RX ADMIN — METOPROLOL SUCCINATE 50 MG: 50 TABLET, EXTENDED RELEASE ORAL at 08:10

## 2021-10-07 RX ADMIN — IPRATROPIUM BROMIDE AND ALBUTEROL SULFATE 3 ML: 2.5; .5 SOLUTION RESPIRATORY (INHALATION) at 10:49

## 2021-10-07 RX ADMIN — APIXABAN 5 MG: 5 TABLET, FILM COATED ORAL at 20:53

## 2021-10-07 RX ADMIN — PREDNISONE 40 MG: 20 TABLET ORAL at 08:09

## 2021-10-07 RX ADMIN — LEVOFLOXACIN 750 MG: 750 TABLET, FILM COATED ORAL at 08:10

## 2021-10-07 RX ADMIN — Medication 10 ML: at 08:09

## 2021-10-07 RX ADMIN — IPRATROPIUM BROMIDE AND ALBUTEROL SULFATE 3 ML: 2.5; .5 SOLUTION RESPIRATORY (INHALATION) at 14:47

## 2021-10-07 RX ADMIN — HEPARIN SODIUM 5000 UNITS: 5000 INJECTION INTRAVENOUS; SUBCUTANEOUS at 12:56

## 2021-10-07 RX ADMIN — APIXABAN 5 MG: 5 TABLET, FILM COATED ORAL at 08:09

## 2021-10-07 RX ADMIN — HEPARIN SODIUM 5000 UNITS: 5000 INJECTION INTRAVENOUS; SUBCUTANEOUS at 05:40

## 2021-10-07 RX ADMIN — DULOXETINE 20 MG: 20 CAPSULE, DELAYED RELEASE ORAL at 08:09

## 2021-10-07 RX ADMIN — IPRATROPIUM BROMIDE AND ALBUTEROL SULFATE 3 ML: 2.5; .5 SOLUTION RESPIRATORY (INHALATION) at 07:03

## 2021-10-07 RX ADMIN — ACETYLCYSTEINE 2 ML: 200 SOLUTION ORAL; RESPIRATORY (INHALATION) at 19:11

## 2021-10-07 RX ADMIN — TRAZODONE HYDROCHLORIDE 50 MG: 50 TABLET ORAL at 20:53

## 2021-10-07 RX ADMIN — IPRATROPIUM BROMIDE AND ALBUTEROL SULFATE 3 ML: 2.5; .5 SOLUTION RESPIRATORY (INHALATION) at 18:58

## 2021-10-07 RX ADMIN — FUROSEMIDE 20 MG: 10 INJECTION, SOLUTION INTRAMUSCULAR; INTRAVENOUS at 05:40

## 2021-10-07 RX ADMIN — PANTOPRAZOLE SODIUM 40 MG: 40 TABLET, DELAYED RELEASE ORAL at 05:40

## 2021-10-07 RX ADMIN — INSULIN LISPRO 2 UNITS: 100 INJECTION, SOLUTION INTRAVENOUS; SUBCUTANEOUS at 17:08

## 2021-10-07 RX ADMIN — IPRATROPIUM BROMIDE AND ALBUTEROL SULFATE 3 ML: 2.5; .5 SOLUTION RESPIRATORY (INHALATION) at 00:45

## 2021-10-07 RX ADMIN — DIGOXIN 125 MCG: 125 TABLET ORAL at 12:56

## 2021-10-08 LAB
ALBUMIN SERPL-MCNC: 2.5 G/DL (ref 3.5–5.2)
ALBUMIN/GLOB SERPL: 1.4 G/DL
ALP SERPL-CCNC: 109 U/L (ref 39–117)
ALT SERPL W P-5'-P-CCNC: 8 U/L (ref 1–33)
ANION GAP SERPL CALCULATED.3IONS-SCNC: 3 MMOL/L (ref 5–15)
AST SERPL-CCNC: 12 U/L (ref 1–32)
BASOPHILS # BLD AUTO: 0 10*3/MM3 (ref 0–0.2)
BASOPHILS NFR BLD AUTO: 0.1 % (ref 0–1.5)
BILIRUB SERPL-MCNC: 0.3 MG/DL (ref 0–1.2)
BUN SERPL-MCNC: 29 MG/DL (ref 8–23)
BUN/CREAT SERPL: 53.7 (ref 7–25)
CALCIUM SPEC-SCNC: 8.4 MG/DL (ref 8.6–10.5)
CHLORIDE SERPL-SCNC: 90 MMOL/L (ref 98–107)
CO2 SERPL-SCNC: 46 MMOL/L (ref 22–29)
CREAT SERPL-MCNC: 0.54 MG/DL (ref 0.57–1)
DEPRECATED RDW RBC AUTO: 48.1 FL (ref 37–54)
EOSINOPHIL # BLD AUTO: 0 10*3/MM3 (ref 0–0.4)
EOSINOPHIL NFR BLD AUTO: 0 % (ref 0.3–6.2)
ERYTHROCYTE [DISTWIDTH] IN BLOOD BY AUTOMATED COUNT: 15.2 % (ref 12.3–15.4)
GFR SERPL CREATININE-BSD FRML MDRD: 108 ML/MIN/1.73
GLOBULIN UR ELPH-MCNC: 1.8 GM/DL
GLUCOSE BLDC GLUCOMTR-MCNC: 122 MG/DL (ref 70–105)
GLUCOSE BLDC GLUCOMTR-MCNC: 156 MG/DL (ref 70–105)
GLUCOSE BLDC GLUCOMTR-MCNC: 210 MG/DL (ref 70–105)
GLUCOSE BLDC GLUCOMTR-MCNC: 356 MG/DL (ref 70–105)
GLUCOSE SERPL-MCNC: 131 MG/DL (ref 65–99)
HCT VFR BLD AUTO: 23.9 % (ref 34–46.6)
HGB BLD-MCNC: 7.7 G/DL (ref 12–15.9)
LYMPHOCYTES # BLD AUTO: 1.2 10*3/MM3 (ref 0.7–3.1)
LYMPHOCYTES NFR BLD AUTO: 19.9 % (ref 19.6–45.3)
MAGNESIUM SERPL-MCNC: 1.9 MG/DL (ref 1.6–2.4)
MCH RBC QN AUTO: 29 PG (ref 26.6–33)
MCHC RBC AUTO-ENTMCNC: 32.1 G/DL (ref 31.5–35.7)
MCV RBC AUTO: 90.2 FL (ref 79–97)
MONOCYTES # BLD AUTO: 0.4 10*3/MM3 (ref 0.1–0.9)
MONOCYTES NFR BLD AUTO: 6 % (ref 5–12)
NEUTROPHILS NFR BLD AUTO: 4.5 10*3/MM3 (ref 1.7–7)
NEUTROPHILS NFR BLD AUTO: 74 % (ref 42.7–76)
NRBC BLD AUTO-RTO: 0 /100 WBC (ref 0–0.2)
PHOSPHATE SERPL-MCNC: 3.6 MG/DL (ref 2.5–4.5)
PLATELET # BLD AUTO: 202 10*3/MM3 (ref 140–450)
PMV BLD AUTO: 7.1 FL (ref 6–12)
POTASSIUM SERPL-SCNC: 4 MMOL/L (ref 3.5–5.2)
PROT SERPL-MCNC: 4.3 G/DL (ref 6–8.5)
RBC # BLD AUTO: 2.65 10*6/MM3 (ref 3.77–5.28)
SODIUM SERPL-SCNC: 139 MMOL/L (ref 136–145)
WBC # BLD AUTO: 6.1 10*3/MM3 (ref 3.4–10.8)

## 2021-10-08 PROCEDURE — 82962 GLUCOSE BLOOD TEST: CPT

## 2021-10-08 PROCEDURE — 97535 SELF CARE MNGMENT TRAINING: CPT

## 2021-10-08 PROCEDURE — 63710000001 PREDNISONE PER 5 MG: Performed by: HOSPITALIST

## 2021-10-08 PROCEDURE — 94799 UNLISTED PULMONARY SVC/PX: CPT

## 2021-10-08 PROCEDURE — 94660 CPAP INITIATION&MGMT: CPT

## 2021-10-08 PROCEDURE — 80053 COMPREHEN METABOLIC PANEL: CPT | Performed by: HOSPITALIST

## 2021-10-08 PROCEDURE — 85025 COMPLETE CBC W/AUTO DIFF WBC: CPT | Performed by: HOSPITALIST

## 2021-10-08 PROCEDURE — 84100 ASSAY OF PHOSPHORUS: CPT | Performed by: HOSPITALIST

## 2021-10-08 PROCEDURE — 63710000001 INSULIN LISPRO (HUMAN) PER 5 UNITS: Performed by: HOSPITALIST

## 2021-10-08 PROCEDURE — 25010000002 FUROSEMIDE PER 20 MG: Performed by: HOSPITALIST

## 2021-10-08 PROCEDURE — 83735 ASSAY OF MAGNESIUM: CPT | Performed by: HOSPITALIST

## 2021-10-08 PROCEDURE — 63710000001 PREDNISONE PER 1 MG: Performed by: HOSPITALIST

## 2021-10-08 PROCEDURE — 99232 SBSQ HOSP IP/OBS MODERATE 35: CPT | Performed by: INTERNAL MEDICINE

## 2021-10-08 PROCEDURE — 99232 SBSQ HOSP IP/OBS MODERATE 35: CPT | Performed by: HOSPITALIST

## 2021-10-08 RX ORDER — FUROSEMIDE 20 MG/1
20 TABLET ORAL DAILY
Status: DISCONTINUED | OUTPATIENT
Start: 2021-10-09 | End: 2021-10-09 | Stop reason: HOSPADM

## 2021-10-08 RX ADMIN — FUROSEMIDE 20 MG: 10 INJECTION, SOLUTION INTRAMUSCULAR; INTRAVENOUS at 05:32

## 2021-10-08 RX ADMIN — IPRATROPIUM BROMIDE AND ALBUTEROL SULFATE 3 ML: 2.5; .5 SOLUTION RESPIRATORY (INHALATION) at 03:37

## 2021-10-08 RX ADMIN — ASPIRIN 81 MG: 81 TABLET, COATED ORAL at 08:27

## 2021-10-08 RX ADMIN — Medication 10 ML: at 08:27

## 2021-10-08 RX ADMIN — IPRATROPIUM BROMIDE AND ALBUTEROL SULFATE 3 ML: 2.5; .5 SOLUTION RESPIRATORY (INHALATION) at 10:19

## 2021-10-08 RX ADMIN — POLYETHYLENE GLYCOL, PROPYLENE GLYCOL 1 DROP: .4; .3 LIQUID OPHTHALMIC at 17:54

## 2021-10-08 RX ADMIN — INSULIN LISPRO 6 UNITS: 100 INJECTION, SOLUTION INTRAVENOUS; SUBCUTANEOUS at 17:25

## 2021-10-08 RX ADMIN — DIGOXIN 125 MCG: 125 TABLET ORAL at 11:05

## 2021-10-08 RX ADMIN — TRAMADOL HYDROCHLORIDE 50 MG: 50 TABLET, FILM COATED ORAL at 08:39

## 2021-10-08 RX ADMIN — PREDNISONE 30 MG: 20 TABLET ORAL at 08:28

## 2021-10-08 RX ADMIN — APIXABAN 2.5 MG: 2.5 TABLET, FILM COATED ORAL at 20:42

## 2021-10-08 RX ADMIN — IPRATROPIUM BROMIDE AND ALBUTEROL SULFATE 3 ML: 2.5; .5 SOLUTION RESPIRATORY (INHALATION) at 19:56

## 2021-10-08 RX ADMIN — Medication 10 ML: at 20:41

## 2021-10-08 RX ADMIN — TRAZODONE HYDROCHLORIDE 50 MG: 50 TABLET ORAL at 20:42

## 2021-10-08 RX ADMIN — IPRATROPIUM BROMIDE AND ALBUTEROL SULFATE 3 ML: 2.5; .5 SOLUTION RESPIRATORY (INHALATION) at 23:38

## 2021-10-08 RX ADMIN — CLONAZEPAM 0.5 MG: 0.5 TABLET ORAL at 20:45

## 2021-10-08 RX ADMIN — BUDESONIDE 0.25 MG: 0.5 SUSPENSION RESPIRATORY (INHALATION) at 19:57

## 2021-10-08 RX ADMIN — INSULIN LISPRO 2 UNITS: 100 INJECTION, SOLUTION INTRAVENOUS; SUBCUTANEOUS at 08:28

## 2021-10-08 RX ADMIN — IPRATROPIUM BROMIDE AND ALBUTEROL SULFATE 3 ML: 2.5; .5 SOLUTION RESPIRATORY (INHALATION) at 06:12

## 2021-10-08 RX ADMIN — ACETYLCYSTEINE 2 ML: 200 SOLUTION ORAL; RESPIRATORY (INHALATION) at 06:12

## 2021-10-08 RX ADMIN — DULOXETINE 20 MG: 20 CAPSULE, DELAYED RELEASE ORAL at 08:28

## 2021-10-08 RX ADMIN — IPRATROPIUM BROMIDE AND ALBUTEROL SULFATE 3 ML: 2.5; .5 SOLUTION RESPIRATORY (INHALATION) at 00:09

## 2021-10-08 RX ADMIN — ACETYLCYSTEINE 2 ML: 200 SOLUTION ORAL; RESPIRATORY (INHALATION) at 19:56

## 2021-10-08 RX ADMIN — APIXABAN 5 MG: 5 TABLET, FILM COATED ORAL at 08:28

## 2021-10-08 RX ADMIN — BUDESONIDE 1 MG: 0.5 SUSPENSION RESPIRATORY (INHALATION) at 06:12

## 2021-10-08 RX ADMIN — IPRATROPIUM BROMIDE AND ALBUTEROL SULFATE 3 ML: 2.5; .5 SOLUTION RESPIRATORY (INHALATION) at 14:16

## 2021-10-08 RX ADMIN — PANTOPRAZOLE SODIUM 40 MG: 40 TABLET, DELAYED RELEASE ORAL at 05:30

## 2021-10-08 RX ADMIN — METOPROLOL SUCCINATE 50 MG: 50 TABLET, EXTENDED RELEASE ORAL at 08:28

## 2021-10-09 VITALS
WEIGHT: 100.1 LBS | SYSTOLIC BLOOD PRESSURE: 96 MMHG | HEIGHT: 64 IN | TEMPERATURE: 98.5 F | OXYGEN SATURATION: 99 % | RESPIRATION RATE: 18 BRPM | BODY MASS INDEX: 17.09 KG/M2 | DIASTOLIC BLOOD PRESSURE: 55 MMHG | HEART RATE: 73 BPM

## 2021-10-09 PROBLEM — E44.0 MODERATE PROTEIN-CALORIE MALNUTRITION (HCC): Chronic | Status: RESOLVED | Noted: 2020-03-18 | Resolved: 2021-10-09

## 2021-10-09 PROBLEM — J44.9 CHRONIC OBSTRUCTIVE PULMONARY DISEASE (HCC): Chronic | Status: RESOLVED | Noted: 2020-03-14 | Resolved: 2021-10-09

## 2021-10-09 LAB
ALBUMIN SERPL-MCNC: 2.4 G/DL (ref 3.5–5.2)
ALBUMIN/GLOB SERPL: 1.3 G/DL
ALP SERPL-CCNC: 107 U/L (ref 39–117)
ALT SERPL W P-5'-P-CCNC: 8 U/L (ref 1–33)
ANION GAP SERPL CALCULATED.3IONS-SCNC: 1 MMOL/L (ref 5–15)
AST SERPL-CCNC: 11 U/L (ref 1–32)
BASOPHILS # BLD AUTO: 0 10*3/MM3 (ref 0–0.2)
BASOPHILS NFR BLD AUTO: 0.1 % (ref 0–1.5)
BILIRUB SERPL-MCNC: 0.3 MG/DL (ref 0–1.2)
BUN SERPL-MCNC: 36 MG/DL (ref 8–23)
BUN/CREAT SERPL: 61 (ref 7–25)
CALCIUM SPEC-SCNC: 8.4 MG/DL (ref 8.6–10.5)
CHLORIDE SERPL-SCNC: 90 MMOL/L (ref 98–107)
CO2 SERPL-SCNC: 48 MMOL/L (ref 22–29)
CREAT SERPL-MCNC: 0.59 MG/DL (ref 0.57–1)
DEPRECATED RDW RBC AUTO: 47.3 FL (ref 37–54)
EOSINOPHIL # BLD AUTO: 0 10*3/MM3 (ref 0–0.4)
EOSINOPHIL NFR BLD AUTO: 0.1 % (ref 0.3–6.2)
ERYTHROCYTE [DISTWIDTH] IN BLOOD BY AUTOMATED COUNT: 14.9 % (ref 12.3–15.4)
GFR SERPL CREATININE-BSD FRML MDRD: 98 ML/MIN/1.73
GLOBULIN UR ELPH-MCNC: 1.9 GM/DL
GLUCOSE BLDC GLUCOMTR-MCNC: 114 MG/DL (ref 70–105)
GLUCOSE BLDC GLUCOMTR-MCNC: 86 MG/DL (ref 70–105)
GLUCOSE SERPL-MCNC: 94 MG/DL (ref 65–99)
HCT VFR BLD AUTO: 22.7 % (ref 34–46.6)
HGB BLD-MCNC: 7.5 G/DL (ref 12–15.9)
LYMPHOCYTES # BLD AUTO: 1.2 10*3/MM3 (ref 0.7–3.1)
LYMPHOCYTES NFR BLD AUTO: 18.4 % (ref 19.6–45.3)
MAGNESIUM SERPL-MCNC: 1.7 MG/DL (ref 1.6–2.4)
MCH RBC QN AUTO: 29.4 PG (ref 26.6–33)
MCHC RBC AUTO-ENTMCNC: 33.1 G/DL (ref 31.5–35.7)
MCV RBC AUTO: 88.9 FL (ref 79–97)
MONOCYTES # BLD AUTO: 0.4 10*3/MM3 (ref 0.1–0.9)
MONOCYTES NFR BLD AUTO: 5.5 % (ref 5–12)
NEUTROPHILS NFR BLD AUTO: 5 10*3/MM3 (ref 1.7–7)
NEUTROPHILS NFR BLD AUTO: 75.9 % (ref 42.7–76)
NRBC BLD AUTO-RTO: 0 /100 WBC (ref 0–0.2)
PHOSPHATE SERPL-MCNC: 2.5 MG/DL (ref 2.5–4.5)
PLATELET # BLD AUTO: 242 10*3/MM3 (ref 140–450)
PMV BLD AUTO: 6.6 FL (ref 6–12)
POTASSIUM SERPL-SCNC: 4.1 MMOL/L (ref 3.5–5.2)
PROT SERPL-MCNC: 4.3 G/DL (ref 6–8.5)
RBC # BLD AUTO: 2.55 10*6/MM3 (ref 3.77–5.28)
SODIUM SERPL-SCNC: 139 MMOL/L (ref 136–145)
WBC # BLD AUTO: 6.6 10*3/MM3 (ref 3.4–10.8)

## 2021-10-09 PROCEDURE — 82962 GLUCOSE BLOOD TEST: CPT

## 2021-10-09 PROCEDURE — 80053 COMPREHEN METABOLIC PANEL: CPT | Performed by: HOSPITALIST

## 2021-10-09 PROCEDURE — 85025 COMPLETE CBC W/AUTO DIFF WBC: CPT | Performed by: HOSPITALIST

## 2021-10-09 PROCEDURE — 63710000001 PREDNISONE PER 5 MG: Performed by: HOSPITALIST

## 2021-10-09 PROCEDURE — 94799 UNLISTED PULMONARY SVC/PX: CPT

## 2021-10-09 PROCEDURE — 83735 ASSAY OF MAGNESIUM: CPT | Performed by: HOSPITALIST

## 2021-10-09 PROCEDURE — 84100 ASSAY OF PHOSPHORUS: CPT | Performed by: HOSPITALIST

## 2021-10-09 PROCEDURE — 99239 HOSP IP/OBS DSCHRG MGMT >30: CPT | Performed by: INTERNAL MEDICINE

## 2021-10-09 PROCEDURE — 63710000001 PREDNISONE PER 1 MG: Performed by: HOSPITALIST

## 2021-10-09 RX ADMIN — IPRATROPIUM BROMIDE AND ALBUTEROL SULFATE 3 ML: 2.5; .5 SOLUTION RESPIRATORY (INHALATION) at 06:53

## 2021-10-09 RX ADMIN — PREDNISONE 30 MG: 20 TABLET ORAL at 09:38

## 2021-10-09 RX ADMIN — ACETYLCYSTEINE 2 ML: 200 SOLUTION ORAL; RESPIRATORY (INHALATION) at 06:53

## 2021-10-09 RX ADMIN — IPRATROPIUM BROMIDE AND ALBUTEROL SULFATE 3 ML: 2.5; .5 SOLUTION RESPIRATORY (INHALATION) at 10:50

## 2021-10-09 RX ADMIN — DIGOXIN 125 MCG: 125 TABLET ORAL at 13:02

## 2021-10-09 RX ADMIN — Medication 10 ML: at 09:38

## 2021-10-09 RX ADMIN — ASPIRIN 81 MG: 81 TABLET, COATED ORAL at 09:38

## 2021-10-09 RX ADMIN — DULOXETINE 20 MG: 20 CAPSULE, DELAYED RELEASE ORAL at 09:38

## 2021-10-09 RX ADMIN — APIXABAN 2.5 MG: 2.5 TABLET, FILM COATED ORAL at 09:38

## 2021-10-09 RX ADMIN — PANTOPRAZOLE SODIUM 40 MG: 40 TABLET, DELAYED RELEASE ORAL at 05:14

## 2021-10-09 RX ADMIN — IPRATROPIUM BROMIDE AND ALBUTEROL SULFATE 3 ML: 2.5; .5 SOLUTION RESPIRATORY (INHALATION) at 14:36

## 2021-10-09 RX ADMIN — IPRATROPIUM BROMIDE AND ALBUTEROL SULFATE 3 ML: 2.5; .5 SOLUTION RESPIRATORY (INHALATION) at 02:55

## 2021-10-09 RX ADMIN — BUDESONIDE 1 MG: 0.5 SUSPENSION RESPIRATORY (INHALATION) at 06:53

## 2021-10-10 ENCOUNTER — INPATIENT HOSPITAL (OUTPATIENT)
Dept: URBAN - METROPOLITAN AREA HOSPITAL 76 | Facility: HOSPITAL | Age: 81
End: 2021-10-10
Payer: COMMERCIAL

## 2021-10-10 DIAGNOSIS — R10.13 EPIGASTRIC PAIN: ICD-10-CM

## 2021-10-10 DIAGNOSIS — K92.1 MELENA: ICD-10-CM

## 2021-10-10 DIAGNOSIS — R06.02 SHORTNESS OF BREATH: ICD-10-CM

## 2021-10-10 DIAGNOSIS — R42 DIZZINESS AND GIDDINESS: ICD-10-CM

## 2021-10-10 PROCEDURE — 99222 1ST HOSP IP/OBS MODERATE 55: CPT | Performed by: INTERNAL MEDICINE

## 2021-10-11 ENCOUNTER — INPATIENT HOSPITAL (OUTPATIENT)
Dept: URBAN - METROPOLITAN AREA HOSPITAL 76 | Facility: HOSPITAL | Age: 81
End: 2021-10-11
Payer: COMMERCIAL

## 2021-10-11 DIAGNOSIS — D12.0 BENIGN NEOPLASM OF CECUM: ICD-10-CM

## 2021-10-11 DIAGNOSIS — K44.9 DIAPHRAGMATIC HERNIA WITHOUT OBSTRUCTION OR GANGRENE: ICD-10-CM

## 2021-10-11 DIAGNOSIS — K57.30 DIVERTICULOSIS OF LARGE INTESTINE WITHOUT PERFORATION OR ABS: ICD-10-CM

## 2021-10-11 DIAGNOSIS — D12.3 BENIGN NEOPLASM OF TRANSVERSE COLON: ICD-10-CM

## 2021-10-11 DIAGNOSIS — D12.2 BENIGN NEOPLASM OF ASCENDING COLON: ICD-10-CM

## 2021-10-11 DIAGNOSIS — D12.8 BENIGN NEOPLASM OF RECTUM: ICD-10-CM

## 2021-10-11 DIAGNOSIS — K92.1 MELENA: ICD-10-CM

## 2021-10-11 DIAGNOSIS — K22.2 ESOPHAGEAL OBSTRUCTION: ICD-10-CM

## 2021-10-11 DIAGNOSIS — D64.9 ANEMIA, UNSPECIFIED: ICD-10-CM

## 2021-10-11 PROCEDURE — 43450 DILATE ESOPHAGUS 1/MULT PASS: CPT | Mod: 59,51 | Performed by: INTERNAL MEDICINE

## 2021-10-11 PROCEDURE — 43450 DILATE ESOPHAGUS 1/MULT PASS: CPT | Mod: 51,59 | Performed by: INTERNAL MEDICINE

## 2021-10-11 PROCEDURE — 45385 COLONOSCOPY W/LESION REMOVAL: CPT | Performed by: INTERNAL MEDICINE

## 2021-10-11 PROCEDURE — 43235 EGD DIAGNOSTIC BRUSH WASH: CPT | Mod: 59 | Performed by: INTERNAL MEDICINE

## 2021-10-12 ENCOUNTER — INPATIENT HOSPITAL (OUTPATIENT)
Dept: URBAN - METROPOLITAN AREA HOSPITAL 76 | Facility: HOSPITAL | Age: 81
End: 2021-10-12
Payer: COMMERCIAL

## 2021-10-12 DIAGNOSIS — K92.2 GASTROINTESTINAL HEMORRHAGE, UNSPECIFIED: ICD-10-CM

## 2021-10-12 DIAGNOSIS — J44.9 CHRONIC OBSTRUCTIVE PULMONARY DISEASE, UNSPECIFIED: ICD-10-CM

## 2021-10-12 DIAGNOSIS — I48.0 PAROXYSMAL ATRIAL FIBRILLATION: ICD-10-CM

## 2021-10-12 DIAGNOSIS — Z96.89 PRESENCE OF OTHER SPECIFIED FUNCTIONAL IMPLANTS: ICD-10-CM

## 2021-10-12 DIAGNOSIS — I50.9 HEART FAILURE, UNSPECIFIED: ICD-10-CM

## 2021-10-12 DIAGNOSIS — Z87.19 PERSONAL HISTORY OF OTHER DISEASES OF THE DIGESTIVE SYSTEM: ICD-10-CM

## 2021-10-12 DIAGNOSIS — Z99.81 DEPENDENCE ON SUPPLEMENTAL OXYGEN: ICD-10-CM

## 2021-10-12 DIAGNOSIS — K57.30 DIVERTICULOSIS OF LARGE INTESTINE WITHOUT PERFORATION OR ABS: ICD-10-CM

## 2021-10-12 DIAGNOSIS — I25.119 ATHEROSCLEROTIC HEART DISEASE OF NATIVE CORONARY ARTERY WITH: ICD-10-CM

## 2021-10-12 PROCEDURE — 99232 SBSQ HOSP IP/OBS MODERATE 35: CPT | Performed by: NURSE PRACTITIONER
